# Patient Record
Sex: MALE | Race: BLACK OR AFRICAN AMERICAN | Employment: OTHER | URBAN - NONMETROPOLITAN AREA
[De-identification: names, ages, dates, MRNs, and addresses within clinical notes are randomized per-mention and may not be internally consistent; named-entity substitution may affect disease eponyms.]

---

## 2024-08-11 ENCOUNTER — APPOINTMENT (OUTPATIENT)
Facility: HOSPITAL | Age: 84
End: 2024-08-11
Payer: OTHER GOVERNMENT

## 2024-08-11 ENCOUNTER — HOSPITAL ENCOUNTER (EMERGENCY)
Facility: HOSPITAL | Age: 84
Discharge: ANOTHER HOSPITAL | End: 2024-08-11
Attending: EMERGENCY MEDICINE
Payer: OTHER GOVERNMENT

## 2024-08-11 ENCOUNTER — HOSPITAL ENCOUNTER (INPATIENT)
Facility: HOSPITAL | Age: 84
LOS: 17 days | Discharge: ANOTHER HOSPITAL | DRG: 204 | End: 2024-08-28
Attending: STUDENT IN AN ORGANIZED HEALTH CARE EDUCATION/TRAINING PROGRAM | Admitting: INTERNAL MEDICINE
Payer: OTHER GOVERNMENT

## 2024-08-11 VITALS
BODY MASS INDEX: 19.44 KG/M2 | WEIGHT: 121 LBS | HEART RATE: 84 BPM | SYSTOLIC BLOOD PRESSURE: 162 MMHG | DIASTOLIC BLOOD PRESSURE: 99 MMHG | HEIGHT: 66 IN | OXYGEN SATURATION: 93 % | TEMPERATURE: 97.8 F | RESPIRATION RATE: 18 BRPM

## 2024-08-11 DIAGNOSIS — E87.5 ACUTE HYPERKALEMIA: ICD-10-CM

## 2024-08-11 DIAGNOSIS — I42.9 CARDIOMYOPATHY, UNSPECIFIED TYPE (HCC): ICD-10-CM

## 2024-08-11 DIAGNOSIS — I47.10 SVT (SUPRAVENTRICULAR TACHYCARDIA) (HCC): ICD-10-CM

## 2024-08-11 DIAGNOSIS — E87.5 HYPERKALEMIA: ICD-10-CM

## 2024-08-11 DIAGNOSIS — N17.9 AKI (ACUTE KIDNEY INJURY) (HCC): Primary | ICD-10-CM

## 2024-08-11 DIAGNOSIS — J90 PLEURAL EFFUSION ON RIGHT: ICD-10-CM

## 2024-08-11 DIAGNOSIS — N17.9 ACUTE RENAL FAILURE, UNSPECIFIED ACUTE RENAL FAILURE TYPE (HCC): Primary | ICD-10-CM

## 2024-08-11 LAB
ALBUMIN SERPL-MCNC: 2.8 G/DL (ref 3.5–5)
ALBUMIN/GLOB SERPL: 0.5 (ref 1.1–2.2)
ALP SERPL-CCNC: 109 U/L (ref 45–117)
ALT SERPL-CCNC: 21 U/L (ref 12–78)
ANION GAP SERPL CALC-SCNC: 10 MMOL/L (ref 5–15)
ANION GAP SERPL CALC-SCNC: 11 MMOL/L (ref 5–15)
ANION GAP SERPL CALC-SCNC: 15 MMOL/L (ref 5–15)
AST SERPL W P-5'-P-CCNC: 26 U/L (ref 15–37)
BASOPHILS # BLD: 0.1 K/UL (ref 0–0.1)
BASOPHILS NFR BLD: 1 % (ref 0–1)
BILIRUB SERPL-MCNC: 0.3 MG/DL (ref 0.2–1)
BUN SERPL-MCNC: 81 MG/DL (ref 6–20)
BUN SERPL-MCNC: 82 MG/DL (ref 6–20)
BUN SERPL-MCNC: 90 MG/DL (ref 6–20)
BUN/CREAT SERPL: 22 (ref 12–20)
BUN/CREAT SERPL: 22 (ref 12–20)
BUN/CREAT SERPL: 23 (ref 12–20)
CA-I BLD-MCNC: 10 MG/DL (ref 8.5–10.1)
CA-I BLD-MCNC: 8.8 MG/DL (ref 8.5–10.1)
CA-I BLD-MCNC: 9.1 MG/DL (ref 8.5–10.1)
CHLORIDE SERPL-SCNC: 100 MMOL/L (ref 97–108)
CHLORIDE SERPL-SCNC: 111 MMOL/L (ref 97–108)
CHLORIDE SERPL-SCNC: 111 MMOL/L (ref 97–108)
CO2 SERPL-SCNC: 17 MMOL/L (ref 21–32)
CO2 SERPL-SCNC: 18 MMOL/L (ref 21–32)
CO2 SERPL-SCNC: 18 MMOL/L (ref 21–32)
CREAT SERPL-MCNC: 3.62 MG/DL (ref 0.7–1.3)
CREAT SERPL-MCNC: 3.62 MG/DL (ref 0.7–1.3)
CREAT SERPL-MCNC: 4.16 MG/DL (ref 0.7–1.3)
DIFFERENTIAL METHOD BLD: ABNORMAL
EOSINOPHIL # BLD: 0.2 K/UL (ref 0–0.4)
EOSINOPHIL NFR BLD: 2 % (ref 0–7)
ERYTHROCYTE [DISTWIDTH] IN BLOOD BY AUTOMATED COUNT: 18.6 % (ref 11.5–14.5)
GLOBULIN SER CALC-MCNC: 5.3 G/DL (ref 2–4)
GLUCOSE SERPL-MCNC: 119 MG/DL (ref 65–100)
GLUCOSE SERPL-MCNC: 125 MG/DL (ref 65–100)
GLUCOSE SERPL-MCNC: 152 MG/DL (ref 65–100)
HCT VFR BLD AUTO: 30.8 % (ref 36.6–50.3)
HGB BLD-MCNC: 9.7 G/DL (ref 12.1–17)
IMM GRANULOCYTES # BLD AUTO: 0.1 K/UL (ref 0–0.04)
IMM GRANULOCYTES NFR BLD AUTO: 1 % (ref 0–0.5)
LYMPHOCYTES # BLD: 0.4 K/UL (ref 0.8–3.5)
LYMPHOCYTES NFR BLD: 5 % (ref 12–49)
MAGNESIUM SERPL-MCNC: 2.4 MG/DL (ref 1.6–2.4)
MCH RBC QN AUTO: 31 PG (ref 26–34)
MCHC RBC AUTO-ENTMCNC: 31.5 G/DL (ref 30–36.5)
MCV RBC AUTO: 98.4 FL (ref 80–99)
MONOCYTES # BLD: 1.2 K/UL (ref 0–1)
MONOCYTES NFR BLD: 16 % (ref 5–13)
NEUTS SEG # BLD: 5.6 K/UL (ref 1.8–8)
NEUTS SEG NFR BLD: 75 % (ref 32–75)
NRBC # BLD: 0 K/UL (ref 0–0.01)
NRBC BLD-RTO: 0 PER 100 WBC
PLATELET # BLD AUTO: 351 K/UL (ref 150–400)
PMV BLD AUTO: 9.6 FL (ref 8.9–12.9)
POTASSIUM SERPL-SCNC: 5.1 MMOL/L (ref 3.5–5.1)
POTASSIUM SERPL-SCNC: 5.5 MMOL/L (ref 3.5–5.1)
POTASSIUM SERPL-SCNC: 6.6 MMOL/L (ref 3.5–5.1)
PROT SERPL-MCNC: 8.1 G/DL (ref 6.4–8.2)
RBC # BLD AUTO: 3.13 M/UL (ref 4.1–5.7)
RBC MORPH BLD: ABNORMAL
SODIUM SERPL-SCNC: 133 MMOL/L (ref 136–145)
SODIUM SERPL-SCNC: 138 MMOL/L (ref 136–145)
SODIUM SERPL-SCNC: 140 MMOL/L (ref 136–145)
WBC # BLD AUTO: 7.6 K/UL (ref 4.1–11.1)

## 2024-08-11 PROCEDURE — 80053 COMPREHEN METABOLIC PANEL: CPT

## 2024-08-11 PROCEDURE — 6360000002 HC RX W HCPCS: Performed by: STUDENT IN AN ORGANIZED HEALTH CARE EDUCATION/TRAINING PROGRAM

## 2024-08-11 PROCEDURE — 96365 THER/PROPH/DIAG IV INF INIT: CPT

## 2024-08-11 PROCEDURE — 71045 X-RAY EXAM CHEST 1 VIEW: CPT

## 2024-08-11 PROCEDURE — 83735 ASSAY OF MAGNESIUM: CPT

## 2024-08-11 PROCEDURE — 93005 ELECTROCARDIOGRAM TRACING: CPT | Performed by: STUDENT IN AN ORGANIZED HEALTH CARE EDUCATION/TRAINING PROGRAM

## 2024-08-11 PROCEDURE — 85025 COMPLETE CBC W/AUTO DIFF WBC: CPT

## 2024-08-11 PROCEDURE — 93005 ELECTROCARDIOGRAM TRACING: CPT | Performed by: EMERGENCY MEDICINE

## 2024-08-11 PROCEDURE — 36415 COLL VENOUS BLD VENIPUNCTURE: CPT

## 2024-08-11 PROCEDURE — 2500000003 HC RX 250 WO HCPCS: Performed by: EMERGENCY MEDICINE

## 2024-08-11 PROCEDURE — 99285 EMERGENCY DEPT VISIT HI MDM: CPT

## 2024-08-11 PROCEDURE — 2580000003 HC RX 258: Performed by: EMERGENCY MEDICINE

## 2024-08-11 PROCEDURE — 96375 TX/PRO/DX INJ NEW DRUG ADDON: CPT

## 2024-08-11 PROCEDURE — 80048 BASIC METABOLIC PNL TOTAL CA: CPT

## 2024-08-11 PROCEDURE — 82550 ASSAY OF CK (CPK): CPT

## 2024-08-11 PROCEDURE — 94640 AIRWAY INHALATION TREATMENT: CPT

## 2024-08-11 PROCEDURE — 94761 N-INVAS EAR/PLS OXIMETRY MLT: CPT

## 2024-08-11 PROCEDURE — 96361 HYDRATE IV INFUSION ADD-ON: CPT

## 2024-08-11 PROCEDURE — 6360000002 HC RX W HCPCS: Performed by: EMERGENCY MEDICINE

## 2024-08-11 PROCEDURE — 1100000000 HC RM PRIVATE

## 2024-08-11 PROCEDURE — 6370000000 HC RX 637 (ALT 250 FOR IP): Performed by: EMERGENCY MEDICINE

## 2024-08-11 PROCEDURE — 96374 THER/PROPH/DIAG INJ IV PUSH: CPT

## 2024-08-11 RX ORDER — PRIMIDONE 50 MG/1
100 TABLET ORAL 2 TIMES DAILY
Status: DISCONTINUED | OUTPATIENT
Start: 2024-08-12 | End: 2024-08-28 | Stop reason: HOSPADM

## 2024-08-11 RX ORDER — POLYETHYLENE GLYCOL 3350 17 G/17G
17 POWDER, FOR SOLUTION ORAL DAILY PRN
Status: DISCONTINUED | OUTPATIENT
Start: 2024-08-11 | End: 2024-08-28 | Stop reason: HOSPADM

## 2024-08-11 RX ORDER — ONDANSETRON 2 MG/ML
4 INJECTION INTRAMUSCULAR; INTRAVENOUS EVERY 6 HOURS PRN
Status: DISCONTINUED | OUTPATIENT
Start: 2024-08-11 | End: 2024-08-28 | Stop reason: HOSPADM

## 2024-08-11 RX ORDER — HYDROMORPHONE HYDROCHLORIDE 1 MG/ML
0.5 INJECTION, SOLUTION INTRAMUSCULAR; INTRAVENOUS; SUBCUTANEOUS EVERY 4 HOURS PRN
Status: DISPENSED | OUTPATIENT
Start: 2024-08-11 | End: 2024-08-13

## 2024-08-11 RX ORDER — HYDROMORPHONE HYDROCHLORIDE 1 MG/ML
0.25 INJECTION, SOLUTION INTRAMUSCULAR; INTRAVENOUS; SUBCUTANEOUS EVERY 4 HOURS PRN
Status: DISPENSED | OUTPATIENT
Start: 2024-08-11 | End: 2024-08-13

## 2024-08-11 RX ORDER — PRIMIDONE 50 MG/1
100 TABLET ORAL 2 TIMES DAILY
COMMUNITY
Start: 2022-01-14

## 2024-08-11 RX ORDER — AMLODIPINE BESYLATE 5 MG/1
10 TABLET ORAL DAILY
Status: DISCONTINUED | OUTPATIENT
Start: 2024-08-12 | End: 2024-08-15

## 2024-08-11 RX ORDER — DEXTROSE MONOHYDRATE 25 G/50ML
25 INJECTION, SOLUTION INTRAVENOUS ONCE
Status: COMPLETED | OUTPATIENT
Start: 2024-08-11 | End: 2024-08-11

## 2024-08-11 RX ORDER — ATORVASTATIN CALCIUM 40 MG/1
0.5 TABLET, FILM COATED ORAL DAILY
COMMUNITY
Start: 2024-07-31

## 2024-08-11 RX ORDER — HYDRALAZINE HYDROCHLORIDE 25 MG/1
25 TABLET, FILM COATED ORAL 3 TIMES DAILY
COMMUNITY

## 2024-08-11 RX ORDER — FENTANYL CITRATE 50 UG/ML
25 INJECTION, SOLUTION INTRAMUSCULAR; INTRAVENOUS
Status: COMPLETED | OUTPATIENT
Start: 2024-08-11 | End: 2024-08-11

## 2024-08-11 RX ORDER — SODIUM CHLORIDE 0.9 % (FLUSH) 0.9 %
5-40 SYRINGE (ML) INJECTION EVERY 12 HOURS SCHEDULED
Status: DISCONTINUED | OUTPATIENT
Start: 2024-08-11 | End: 2024-08-28 | Stop reason: HOSPADM

## 2024-08-11 RX ORDER — 0.9 % SODIUM CHLORIDE 0.9 %
1000 INTRAVENOUS SOLUTION INTRAVENOUS ONCE
Status: COMPLETED | OUTPATIENT
Start: 2024-08-11 | End: 2024-08-11

## 2024-08-11 RX ORDER — ACETAMINOPHEN 650 MG/1
650 SUPPOSITORY RECTAL EVERY 6 HOURS PRN
Status: DISCONTINUED | OUTPATIENT
Start: 2024-08-11 | End: 2024-08-28 | Stop reason: HOSPADM

## 2024-08-11 RX ORDER — HYDRALAZINE HYDROCHLORIDE 25 MG/1
25 TABLET, FILM COATED ORAL 3 TIMES DAILY
Status: DISCONTINUED | OUTPATIENT
Start: 2024-08-11 | End: 2024-08-16

## 2024-08-11 RX ORDER — ONDANSETRON 4 MG/1
4 TABLET, ORALLY DISINTEGRATING ORAL EVERY 8 HOURS PRN
Status: DISCONTINUED | OUTPATIENT
Start: 2024-08-11 | End: 2024-08-28 | Stop reason: HOSPADM

## 2024-08-11 RX ORDER — MEGESTROL ACETATE 40 MG/ML
400 SUSPENSION ORAL DAILY
Status: DISCONTINUED | OUTPATIENT
Start: 2024-08-12 | End: 2024-08-28 | Stop reason: HOSPADM

## 2024-08-11 RX ORDER — SODIUM CHLORIDE 0.9 % (FLUSH) 0.9 %
5-40 SYRINGE (ML) INJECTION PRN
Status: DISCONTINUED | OUTPATIENT
Start: 2024-08-11 | End: 2024-08-28 | Stop reason: HOSPADM

## 2024-08-11 RX ORDER — LANOLIN ALCOHOL/MO/W.PET/CERES
1000 CREAM (GRAM) TOPICAL DAILY
Status: DISCONTINUED | OUTPATIENT
Start: 2024-08-12 | End: 2024-08-12

## 2024-08-11 RX ORDER — ATORVASTATIN CALCIUM 20 MG/1
20 TABLET, FILM COATED ORAL DAILY
Status: DISCONTINUED | OUTPATIENT
Start: 2024-08-12 | End: 2024-08-28 | Stop reason: HOSPADM

## 2024-08-11 RX ORDER — MEGESTROL ACETATE 40 MG/ML
400 SUSPENSION ORAL DAILY
COMMUNITY
Start: 2024-07-29

## 2024-08-11 RX ORDER — CALCIUM GLUCONATE 10 MG/ML
1000 INJECTION, SOLUTION INTRAVENOUS
Status: COMPLETED | OUTPATIENT
Start: 2024-08-11 | End: 2024-08-11

## 2024-08-11 RX ORDER — ACETAMINOPHEN 325 MG/1
650 TABLET ORAL EVERY 6 HOURS PRN
Status: DISCONTINUED | OUTPATIENT
Start: 2024-08-11 | End: 2024-08-28 | Stop reason: HOSPADM

## 2024-08-11 RX ORDER — SODIUM CHLORIDE 9 MG/ML
INJECTION, SOLUTION INTRAVENOUS PRN
Status: DISCONTINUED | OUTPATIENT
Start: 2024-08-11 | End: 2024-08-28 | Stop reason: HOSPADM

## 2024-08-11 RX ORDER — AMLODIPINE BESYLATE 10 MG/1
10 TABLET ORAL DAILY
COMMUNITY

## 2024-08-11 RX ADMIN — SODIUM BICARBONATE 50 MEQ: 84 INJECTION, SOLUTION INTRAVENOUS at 16:16

## 2024-08-11 RX ADMIN — ALBUTEROL SULFATE 2.5 MG: 2.5 SOLUTION RESPIRATORY (INHALATION) at 16:24

## 2024-08-11 RX ADMIN — FENTANYL CITRATE 25 MCG: 50 INJECTION INTRAMUSCULAR; INTRAVENOUS at 22:25

## 2024-08-11 RX ADMIN — CALCIUM GLUCONATE 1000 MG: 10 INJECTION, SOLUTION INTRAVENOUS at 15:07

## 2024-08-11 RX ADMIN — DEXTROSE MONOHYDRATE 25 G: 25 INJECTION, SOLUTION INTRAVENOUS at 16:16

## 2024-08-11 RX ADMIN — SODIUM CHLORIDE 1000 ML: 9 INJECTION, SOLUTION INTRAVENOUS at 15:07

## 2024-08-11 RX ADMIN — INSULIN HUMAN 10 UNITS: 100 INJECTION, SOLUTION PARENTERAL at 16:16

## 2024-08-11 RX ADMIN — ALBUTEROL SULFATE 2.5 MG: 2.5 SOLUTION RESPIRATORY (INHALATION) at 16:11

## 2024-08-11 NOTE — ED PROVIDER NOTES
[facility] EMERGENCY DEPT  EMERGENCY DEPARTMENT HISTORY AND PHYSICAL EXAM      Date: 8/11/2024  Patient Name: Abel Cruz  MRN: [id number]  YOB: 1940  Date of evaluation: 8/11/2024  Provider: Chen Talamantes MD   Note Started: 12:31 PM EDT 8/11/24    HISTORY OF PRESENT ILLNESS     Chief Complaint   Patient presents with    Flank Pain    decreased PO intake     Emesis       History Provided By: Patient    HPI: Abel Cruz is a 83 y.o. male     PAST MEDICAL HISTORY   Past Medical History:  No past medical history on file.    Past Surgical History:  No past surgical history on file.    Family History:  No family history on file.    Social History:       Allergies:  Not on File    PCP: Jeremy Grimes MD    Current Meds:   No current facility-administered medications for this encounter.     No current outpatient medications on file.       Social Determinants of Health:   Social Determinants of Health     Tobacco Use: Medium Risk (7/30/2024)    Received from [employer] [facility]    Patient History     Smoking Tobacco Use: Former     Smokeless Tobacco Use: Never     Passive Exposure: Not on file   Alcohol Use: Not on file   Financial Resource Strain: Not on file   Food Insecurity: Not on file   Transportation Needs: Not on file   Physical Activity: Not on file   Stress: Not on file   Social Connections: Not on file   Intimate Partner Violence: Not on file   Depression: Not at risk (7/29/2024)    Received from [employer] [facility] [facility]    PHQ-2     PHQ Total Score: 0   Housing Stability: Not on file   Interpersonal Safety: Not on file   Utilities: Not on file       PHYSICAL EXAM   Physical Exam  Vitals and nursing note reviewed.   Constitutional:       General: He is not in acute distress.     Appearance: He is ill-appearing. He is not toxic-appearing or diaphoretic.   HENT:      Head: Normocephalic and atraumatic.      Nose: Nose normal.      Mouth/Throat:      Mouth: Mucous membranes

## 2024-08-11 NOTE — ED NOTES
Patient ate applesauce with no problem.  But then said he had pain in his upper abdomen.  He also said he forgot to tell anyone that his stool yesterday may have been black he wasn't sure because his sight is not good.  So he wasn't sure if it had blood in it or not.  He's resting now and talking with family.  Nurse notified.

## 2024-08-11 NOTE — ED TRIAGE NOTES
Pt reported rt side flank pain on going over the last month pt also with decreased PO intake, pt unable to keep any solid food down

## 2024-08-12 ENCOUNTER — APPOINTMENT (OUTPATIENT)
Facility: HOSPITAL | Age: 84
DRG: 204 | End: 2024-08-12
Attending: INTERNAL MEDICINE
Payer: OTHER GOVERNMENT

## 2024-08-12 ENCOUNTER — APPOINTMENT (OUTPATIENT)
Facility: HOSPITAL | Age: 84
DRG: 204 | End: 2024-08-12
Payer: OTHER GOVERNMENT

## 2024-08-12 PROBLEM — N13.30 HYDRONEPHROSIS: Status: ACTIVE | Noted: 2024-08-11

## 2024-08-12 LAB
ANION GAP SERPL CALC-SCNC: 11 MMOL/L (ref 5–15)
APPEARANCE FLD: ABNORMAL
BASOPHILS # BLD: 0.1 K/UL (ref 0–0.1)
BASOPHILS NFR BLD: 1 % (ref 0–1)
BUN SERPL-MCNC: 83 MG/DL (ref 6–20)
BUN/CREAT SERPL: 24 (ref 12–20)
CA-I BLD-MCNC: 9.6 MG/DL (ref 8.5–10.1)
CHLORIDE SERPL-SCNC: 112 MMOL/L (ref 97–108)
CK SERPL-CCNC: 49 U/L (ref 39–308)
CO2 SERPL-SCNC: 15 MMOL/L (ref 21–32)
COLOR FLD: ABNORMAL
CREAT SERPL-MCNC: 3.41 MG/DL (ref 0.7–1.3)
CREAT UR-MCNC: 114 MG/DL
DIFFERENTIAL METHOD BLD: ABNORMAL
EKG ATRIAL RATE: 87 BPM
EKG ATRIAL RATE: 96 BPM
EKG DIAGNOSIS: NORMAL
EKG DIAGNOSIS: NORMAL
EKG P AXIS: 203 DEGREES
EKG P AXIS: 55 DEGREES
EKG P-R INTERVAL: 122 MS
EKG P-R INTERVAL: 148 MS
EKG Q-T INTERVAL: 384 MS
EKG Q-T INTERVAL: 397 MS
EKG QRS DURATION: 101 MS
EKG QRS DURATION: 96 MS
EKG QTC CALCULATION (BAZETT): 478 MS
EKG QTC CALCULATION (BAZETT): 485 MS
EKG R AXIS: -44 DEGREES
EKG R AXIS: 219 DEGREES
EKG T AXIS: 147 DEGREES
EKG T AXIS: 62 DEGREES
EKG VENTRICULAR RATE: 87 BPM
EKG VENTRICULAR RATE: 96 BPM
EOSINOPHIL # BLD: 0.1 K/UL (ref 0–0.4)
EOSINOPHIL NFR BLD: 2 % (ref 0–7)
EOSINOPHIL NFR FLD MANUAL: 0 %
EOSINOPHIL NFR FLD MANUAL: ABNORMAL %
ERYTHROCYTE [DISTWIDTH] IN BLOOD BY AUTOMATED COUNT: 17.9 % (ref 11.5–14.5)
FERRITIN SERPL-MCNC: 614 NG/ML (ref 26–388)
FOLATE SERPL-MCNC: 6.2 NG/ML (ref 5–21)
GLUCOSE SERPL-MCNC: 119 MG/DL (ref 65–100)
HCT VFR BLD AUTO: 26.5 % (ref 36.6–50.3)
HGB BLD-MCNC: 8.6 G/DL (ref 12.1–17)
IMM GRANULOCYTES # BLD AUTO: 0 K/UL (ref 0–0.04)
IMM GRANULOCYTES NFR BLD AUTO: 0 % (ref 0–0.5)
IRON SATN MFR SERPL: 26 % (ref 20–50)
IRON SERPL-MCNC: 29 UG/DL (ref 35–150)
LDH FLD L TO P-CCNC: 392 U/L
LDH SERPL L TO P-CCNC: 181 U/L (ref 85–241)
LYMPHOCYTES # BLD: 0.4 K/UL (ref 0.8–3.5)
LYMPHOCYTES NFR BLD: 6 % (ref 12–49)
LYMPHOCYTES NFR FLD: 4 %
LYMPHOCYTES NFR FLD: ABNORMAL %
MCH RBC QN AUTO: 30.9 PG (ref 26–34)
MCHC RBC AUTO-ENTMCNC: 32.5 G/DL (ref 30–36.5)
MCV RBC AUTO: 95.3 FL (ref 80–99)
MONOCYTES # BLD: 1.4 K/UL (ref 0–1)
MONOCYTES NFR BLD: 21 % (ref 5–13)
MONOCYTES NFR FLD: 0 %
MONOCYTES NFR FLD: ABNORMAL %
MONOS+MACROS NFR FLD: 14 %
NEUTROPHILS NFR FLD: 82 %
NEUTROPHILS NFR FLD: ABNORMAL %
NEUTS BAND # FLD: 0 %
NEUTS BAND # FLD: ABNORMAL %
NEUTS SEG # BLD: 4.8 K/UL (ref 1.8–8)
NEUTS SEG NFR BLD: 70 % (ref 32–75)
NRBC # BLD: 0 K/UL (ref 0–0.01)
NRBC BLD-RTO: 0 PER 100 WBC
NUC CELL # FLD: 2066 /CU MM
PLATELET # BLD AUTO: 287 K/UL (ref 150–400)
PMV BLD AUTO: 9.5 FL (ref 8.9–12.9)
POTASSIUM SERPL-SCNC: 4.9 MMOL/L (ref 3.5–5.1)
PROT FLD-MCNC: 5.5 G/DL
RBC # BLD AUTO: 2.78 M/UL (ref 4.1–5.7)
RBC # FLD: >100 /CU MM
RBC MORPH BLD: ABNORMAL
RETICS # AUTO: 0.05 M/UL (ref 0.03–0.1)
RETICS/RBC NFR AUTO: 2 % (ref 0.7–2.1)
SODIUM SERPL-SCNC: 138 MMOL/L (ref 136–145)
SODIUM UR-SCNC: 36 MMOL/L
SPECIMEN SOURCE FLD: ABNORMAL
SPECIMEN SOURCE FLD: NORMAL
SPECIMEN SOURCE FLD: NORMAL
TIBC SERPL-MCNC: 113 UG/DL (ref 250–450)
VIT B12 SERPL-MCNC: >2000 PG/ML (ref 193–986)
WBC # BLD AUTO: 6.8 K/UL (ref 4.1–11.1)

## 2024-08-12 PROCEDURE — C1729 CATH, DRAINAGE: HCPCS

## 2024-08-12 PROCEDURE — 71045 X-RAY EXAM CHEST 1 VIEW: CPT

## 2024-08-12 PROCEDURE — 2500000003 HC RX 250 WO HCPCS: Performed by: INTERNAL MEDICINE

## 2024-08-12 PROCEDURE — 87205 SMEAR GRAM STAIN: CPT

## 2024-08-12 PROCEDURE — 2500000003 HC RX 250 WO HCPCS

## 2024-08-12 PROCEDURE — 36415 COLL VENOUS BLD VENIPUNCTURE: CPT

## 2024-08-12 PROCEDURE — 83540 ASSAY OF IRON: CPT

## 2024-08-12 PROCEDURE — 6360000002 HC RX W HCPCS: Performed by: INTERNAL MEDICINE

## 2024-08-12 PROCEDURE — 6370000000 HC RX 637 (ALT 250 FOR IP): Performed by: INTERNAL MEDICINE

## 2024-08-12 PROCEDURE — 0W993ZZ DRAINAGE OF RIGHT PLEURAL CAVITY, PERCUTANEOUS APPROACH: ICD-10-PCS | Performed by: RADIOLOGY

## 2024-08-12 PROCEDURE — 2580000003 HC RX 258: Performed by: INTERNAL MEDICINE

## 2024-08-12 PROCEDURE — 2580000003 HC RX 258

## 2024-08-12 PROCEDURE — 89051 BODY FLUID CELL COUNT: CPT

## 2024-08-12 PROCEDURE — 6360000002 HC RX W HCPCS

## 2024-08-12 PROCEDURE — 82746 ASSAY OF FOLIC ACID SERUM: CPT

## 2024-08-12 PROCEDURE — 82607 VITAMIN B-12: CPT

## 2024-08-12 PROCEDURE — 82728 ASSAY OF FERRITIN: CPT

## 2024-08-12 PROCEDURE — 80048 BASIC METABOLIC PNL TOTAL CA: CPT

## 2024-08-12 PROCEDURE — 84157 ASSAY OF PROTEIN OTHER: CPT

## 2024-08-12 PROCEDURE — 83615 LACTATE (LD) (LDH) ENZYME: CPT

## 2024-08-12 PROCEDURE — 84300 ASSAY OF URINE SODIUM: CPT

## 2024-08-12 PROCEDURE — 99222 1ST HOSP IP/OBS MODERATE 55: CPT | Performed by: STUDENT IN AN ORGANIZED HEALTH CARE EDUCATION/TRAINING PROGRAM

## 2024-08-12 PROCEDURE — 87070 CULTURE OTHR SPECIMN AEROBIC: CPT

## 2024-08-12 PROCEDURE — 88305 TISSUE EXAM BY PATHOLOGIST: CPT

## 2024-08-12 PROCEDURE — 82570 ASSAY OF URINE CREATININE: CPT

## 2024-08-12 PROCEDURE — 88112 CYTOPATH CELL ENHANCE TECH: CPT

## 2024-08-12 PROCEDURE — 1100000000 HC RM PRIVATE

## 2024-08-12 PROCEDURE — 85025 COMPLETE CBC W/AUTO DIFF WBC: CPT

## 2024-08-12 PROCEDURE — 85045 AUTOMATED RETICULOCYTE COUNT: CPT

## 2024-08-12 RX ORDER — LIDOCAINE 4 G/G
1 PATCH TOPICAL DAILY
COMMUNITY

## 2024-08-12 RX ORDER — PROPRANOLOL HYDROCHLORIDE 80 MG/1
80 CAPSULE, EXTENDED RELEASE ORAL DAILY
Status: DISCONTINUED | OUTPATIENT
Start: 2024-08-12 | End: 2024-08-17

## 2024-08-12 RX ORDER — FERROUS SULFATE 325(65) MG
325 TABLET ORAL
COMMUNITY

## 2024-08-12 RX ORDER — LATANOPROST 50 UG/ML
1 SOLUTION/ DROPS OPHTHALMIC NIGHTLY
COMMUNITY

## 2024-08-12 RX ORDER — SODIUM BICARBONATE 650 MG/1
1300 TABLET ORAL 2 TIMES DAILY
Status: DISCONTINUED | OUTPATIENT
Start: 2024-08-12 | End: 2024-08-14

## 2024-08-12 RX ORDER — FOLIC ACID 1 MG/1
1 TABLET ORAL DAILY
Status: DISCONTINUED | OUTPATIENT
Start: 2024-08-12 | End: 2024-08-28 | Stop reason: HOSPADM

## 2024-08-12 RX ADMIN — SODIUM ZIRCONIUM CYCLOSILICATE 10 G: 10 POWDER, FOR SUSPENSION ORAL at 02:22

## 2024-08-12 RX ADMIN — MEGESTROL ACETATE 400 MG: 40 SUSPENSION ORAL at 13:00

## 2024-08-12 RX ADMIN — AMLODIPINE BESYLATE 10 MG: 5 TABLET ORAL at 10:38

## 2024-08-12 RX ADMIN — HYDRALAZINE HYDROCHLORIDE 25 MG: 25 TABLET ORAL at 10:42

## 2024-08-12 RX ADMIN — SODIUM BICARBONATE: 84 INJECTION, SOLUTION INTRAVENOUS at 13:02

## 2024-08-12 RX ADMIN — PRIMIDONE 100 MG: 50 TABLET ORAL at 21:30

## 2024-08-12 RX ADMIN — SODIUM CHLORIDE, PRESERVATIVE FREE 10 ML: 5 INJECTION INTRAVENOUS at 13:02

## 2024-08-12 RX ADMIN — HYDROMORPHONE HYDROCHLORIDE 0.25 MG: 1 INJECTION, SOLUTION INTRAMUSCULAR; INTRAVENOUS; SUBCUTANEOUS at 10:41

## 2024-08-12 RX ADMIN — HYDRALAZINE HYDROCHLORIDE 25 MG: 25 TABLET ORAL at 02:19

## 2024-08-12 RX ADMIN — PRIMIDONE 100 MG: 50 TABLET ORAL at 13:00

## 2024-08-12 RX ADMIN — SODIUM BICARBONATE 1300 MG: 650 TABLET ORAL at 10:53

## 2024-08-12 RX ADMIN — HYDRALAZINE HYDROCHLORIDE 25 MG: 25 TABLET ORAL at 21:27

## 2024-08-12 RX ADMIN — SODIUM BICARBONATE 50 MEQ: 84 INJECTION, SOLUTION INTRAVENOUS at 10:41

## 2024-08-12 RX ADMIN — SODIUM BICARBONATE 1300 MG: 650 TABLET ORAL at 21:27

## 2024-08-12 RX ADMIN — HYDROMORPHONE HYDROCHLORIDE 0.5 MG: 1 INJECTION, SOLUTION INTRAMUSCULAR; INTRAVENOUS; SUBCUTANEOUS at 02:25

## 2024-08-12 RX ADMIN — PROPRANOLOL HYDROCHLORIDE 80 MG: 80 CAPSULE, EXTENDED RELEASE ORAL at 13:00

## 2024-08-12 RX ADMIN — CYANOCOBALAMIN TAB 1000 MCG 1000 MCG: 1000 TAB at 13:00

## 2024-08-12 RX ADMIN — SODIUM CHLORIDE 125 MG: 9 INJECTION, SOLUTION INTRAVENOUS at 17:12

## 2024-08-12 RX ADMIN — ATORVASTATIN CALCIUM 20 MG: 20 TABLET, FILM COATED ORAL at 10:38

## 2024-08-12 RX ADMIN — SODIUM CHLORIDE, PRESERVATIVE FREE 10 ML: 5 INJECTION INTRAVENOUS at 21:30

## 2024-08-12 ASSESSMENT — PAIN SCALES - GENERAL
PAINLEVEL_OUTOF10: 0
PAINLEVEL_OUTOF10: 8
PAINLEVEL_OUTOF10: 6
PAINLEVEL_OUTOF10: 7

## 2024-08-12 ASSESSMENT — PAIN DESCRIPTION - LOCATION
LOCATION: CHEST
LOCATION: BACK

## 2024-08-12 ASSESSMENT — PAIN DESCRIPTION - DESCRIPTORS: DESCRIPTORS: ACHING;SHARP

## 2024-08-12 ASSESSMENT — PAIN DESCRIPTION - ORIENTATION: ORIENTATION: MID

## 2024-08-12 NOTE — OR NURSING
1500 cc thin red drained from right posterior chest. Stat cxr ordered post thoracentesis. Fluids to the lab for analysis as ordered.

## 2024-08-12 NOTE — H&P
History & Physical    Primary Care Provider: Jeremy Grimes MD    Chief complaint:   Chief Complaint   Patient presents with    transfer of care        History of Presenting Illness:   Abel Cruz is a 83 y.o. male with PMH of hypertension, hypertension, tremors, Presented to the ED with chief complaint of right flank pain for the past month. Progressively worsening, rated 7/10.  Pain exacerbated by cough. He has been having nonproductive cough.  No shortness of breath, wheezing, fever or chills. He also reports unintentional weight loss (10-15 Ibs in past month) and loss of appetite in the past month. Lastly, he reports noticing ome dark stool in the past week, although uncertain due to to poor eyesight. Reports no abdominal pain. 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 Drinks alcohol daily up until a month ago, stopped drinking due to loss of appetite. Non-smoker. Has history of prostate cancer s/p therapy.      In the ED, noted hypertensive. Hemoglobin 9.7, creatinine 3.6, uncertain baseline. Potassium initially 6.6, improved to 5.1 with IV insulin. CT showed new multiple variable sized pulmonary nodules throughout each segment of both lungs suspicious for metastatic disease. New moderate sized right pleural effusion. New mild hydroureteronephrosis on the right kidney. The right adrenal gland has an indeterminate nodule measuring up to 8 mm in diameter. Right kidney demonstrates a 2.5 cm cyst in the upper pole.       Chart review: none          Past Medical History:   Diagnosis Date    Hypertension         Past Surgical History:   Procedure Laterality Date    KIDNEY REMOVAL      over 20 years ago       History reviewed. No pertinent family history.     Social History     Socioeconomic History    Marital status: Single     Spouse name: None    Number of children: None    Years of education: None    Highest education level: None        PTA medications and allergies per EMR.

## 2024-08-12 NOTE — CONSULTS
Urology Consult    Patient: Abel Cruz MRN: [id number]  SSN: [id number]    YOB: 1940  Age: 83 y.o.  Sex: male          Date of Consultation:  August 12, 2024  Requesting Physician: Nikita Shankar MD  Reason for Consultation: Mild Right Hydronephrosis           Assessment/Plan:  NETO on CKD: baseline kidney function is uncertain. Nephrology following.   History of Left Nephrectomy: patient reports kidney was removed many years ago secondary to recurrent kidney stones  Right hydronephrosis - mild hydronephrosis noted on CT report from outside facility. Unable to review images personally. Given NETO and mild hydronephrosis, we will plan for cystoscopy with retrograde pyelogram of the right ureter and kidney on 8/14/24 while patient is admitted.   Prostate Cancer - [facility] brachythe[facility] and [facility] Records review shows the PSA was undetectable in 2022. PSA level pending to monitor for progression.     - Plan for cystoscopy with retrograde pyelogram and possible stent placement of the right ureter and kidney on 8/14/24     History of Present Illness:  Patient is a 83 y.o. male admitted 8/11/2024 to the hospital for Hyperkalemia [E87.5]  Pleural effusion [J90]  NETO (acute kidney injury) (HCC) [N17.9].  He is an 83 year old male admitted to the hospital for decreased appetite with unintentional weight loss, right flank pain. CT scan showed mild hydronephrosis of the right ureter and absent left kidney secondary to previous nephrectomy. Urology consulted for assistance with the hydronephrosis.   Past Medical History:  Allergies   Allergen Reactions    Naproxen Other (See Comments)     Gastric Ulcer With Hemorrhage.    Lisinopril Nausea And Vomiting    Morphine Rash      Prior to Admission medications    Medication Sig Start Date End Date Taking? Authorizing Provider   latanoprost (XALATAN) 0.005 % ophthalmic solution Place 1 drop into both eyes  palpable  LYMPH: No cervical,k supraclavicular or axillary ALMA      Lab Results   Component Value Date/Time    WBC 6.8 08/12/2024 06:24 AM    HCT 26.5 08/12/2024 06:24 AM     08/12/2024 06:24 AM     08/12/2024 06:24 AM    K 4.9 08/12/2024 06:24 AM     08/12/2024 06:24 AM    CO2 15 08/12/2024 06:24 AM    BUN 83 08/12/2024 06:24 AM    MG 2.4 08/11/2024 02:20 PM     I reviewed the outside medical records from [facility]. I also reviewed the labs including a CBC, BMP and the urine creatinine and sodium.     Signed By: Marv Malhotra MD  - August 12, 2024

## 2024-08-12 NOTE — CONSULTS
NAME:  Abel Cruz   :   1940   MRN:   [id number]     ATTENDING: Nils Malik Cha, MD  PCP:  Jeremy Grimes MD    Date/Time:  2024       Subjective:   REQUESTING PHYSICIAN:  Dr. Che  REASON FOR CONSULT:   NETO    History of presenting illness:    Abel Cruz is an 83-year-old male with past medical history including hypertension,  hyperlipidemia, left nephrectomy, and tremors who presented to the emergency room with complaint of right flank pain made worse by coughing, decreased appetite with unintentional weight loss, and possibly dark stools, although he is unsure due to his poor insight.  Creatinine was 4.16 on admission, nephrology consulted for management of NETO.    Patient seen at bedside in the emergency department, he is awake, alert and oriented, and reports continued right flank pain that is worse with coughing.  Denies shortness of breath, chest pain, N/V, fever or chills.  Reports 3-4 episodes of diarrhea a few days ago that could have been dark in color; however, patient is uncertain if stools contained blood.  Patient appears clinically dry.  CT chest without contrast showed innumerable tiny nodules throughout both lungs, a moderate large right pleural effusion, and left nephrectomy.  Initial labs showed creatinine of 4.16 improving to 3.4 today.  Most recent BUN 83, CO2 15.  No nephrotoxic medications noted on home med list.  No hypotensive episodes noticed.    Past Medical History:   Diagnosis Date    Hypertension       Past Surgical History:   Procedure Laterality Date    KIDNEY REMOVAL      over 20 years ago     Social History     Tobacco Use    Smoking status: Not on file    Smokeless tobacco: Not on file   Substance Use Topics    Alcohol use: Not on file      History reviewed. No pertinent family history.    Allergies   Allergen Reactions    Naproxen Other (See Comments)     Gastric Ulcer With Hemorrhage.    Lisinopril Nausea And Vomiting    Morphine Rash      Prior to Admission  mg  4 mg IntraVENous Q6H PRN    polyethylene glycol (GLYCOLAX) packet 17 g  17 g Oral Daily PRN    acetaminophen (TYLENOL) tablet 650 mg  650 mg Oral Q6H PRN    Or    acetaminophen (TYLENOL) suppository 650 mg  650 mg Rectal Q6H PRN    HYDROmorphone HCl PF (DILAUDID) injection 0.5 mg  0.5 mg IntraVENous Q4H PRN    HYDROmorphone HCl PF (DILAUDID) injection 0.25 mg  0.25 mg IntraVENous Q4H PRN    amLODIPine (NORVASC) tablet 10 mg  10 mg Oral Daily    atorvastatin (LIPITOR) tablet 20 mg  20 mg Oral Daily    vitamin B-12 (CYANOCOBALAMIN) tablet 1,000 mcg  1,000 mcg Oral Daily    hydrALAZINE (APRESOLINE) tablet 25 mg  25 mg Oral TID    megestrol (MEGACE) 40 MG/ML suspension 400 mg  400 mg Oral Daily    primidone (MYSOLINE) tablet 100 mg  100 mg Oral BID     Current Outpatient Medications   Medication Sig    latanoprost (XALATAN) 0.005 % ophthalmic solution Place 1 drop into both eyes nightly    Calcium Citrate-Vitamin D (SM CALCIUM CITRATE W/VIT D3 PO) Take 1,000 mg by mouth Daily    lidocaine (BLUE-EMU PAIN RELIEF DRY) 4 % external patch Place 1 patch onto the skin daily    ferrous sulfate (IRON 325) 325 (65 Fe) MG tablet Take 1 tablet by mouth daily (with breakfast)    atorvastatin (LIPITOR) 40 MG tablet Take 0.5 tablets by mouth daily    cyanocobalamin 500 MCG tablet Take 2 tablets by mouth daily    hydrALAZINE (APRESOLINE) 25 MG tablet Take 1 tablet by mouth 3 times daily    megestrol (MEGACE) 40 MG/ML suspension Take 10 mLs by mouth daily    primidone (MYSOLINE) 50 MG tablet Take 150 tablets by mouth 3 times daily    propranolol (INNOPRAN XL) 80 MG extended release capsule Take 1 capsule by mouth daily    amLODIPine (NORVASC) 10 MG tablet Take 1 tablet by mouth daily       REVIEW OF SYSTEMS:     Complaints as mentioned in the history of presenting illness.   No other significant complaints on complete review of systems.    Objective:   VITALS:    BP (!) 134/104   Pulse (!) 103   Temp 98 °F (36.7 °C) (Oral)   Resp

## 2024-08-12 NOTE — ED TRIAGE NOTES
Transfer from [place], sent here for pleural effusion, renal failure. Currently alert and oriented on room air.

## 2024-08-12 NOTE — ED PROVIDER NOTES
EMERGENCY DEPARTMENT HISTORY AND PHYSICAL EXAM      Date: 8/11/2024  Patient Name: Abel Cruz  MRN: [id number]  YOB: 1940  Date of evaluation: 8/11/2024  Provider: Manjinder Holland MD     History of Present Illness     Chief Complaint   Patient presents with    transfer of care       History Provided By: Patient    HPI: Abel Cruz, 83 y.o. male with past medical history as listed and reviewed below presenting to the ED as a transfer of care for pleural effusion, acute kidney injury and hyperkalemia.  Patient has right-sided flank pain for the last month, recently had a CT showing possible metastases and pleural effusion.  Was provided medications for hyperkalemia prior to arrival, nephrology made aware at [facility], no recommendations for dialysis were communicated.    Medical History     Past Medical History:  Past Medical History:   Diagnosis Date    Hypertension        Past Surgical History:  Past Surgical History:   Procedure Laterality Date    KIDNEY REMOVAL      over 20 years ago       Family History:  History reviewed. No pertinent family history.    Social History:       Allergies:  Allergies   Allergen Reactions    Naproxen Other (See Comments)     Gastric Ulcer With Hemorrhage.    Lisinopril Nausea And Vomiting    Morphine Rash       PCP: Jeremy Grimes MD    Current Medications:   Current Facility-Administered Medications   Medication Dose Route Frequency Provider Last Rate Last Admin    sodium zirconium cyclosilicate (LOKELMA) oral suspension 10 g  10 g Oral Once Nils Che MD        sodium chloride flush 0.9 % injection 5-40 mL  5-40 mL IntraVENous 2 times per day Nils Che MD        sodium chloride flush 0.9 % injection 5-40 mL  5-40 mL IntraVENous PRN Nils Che MD        0.9 % sodium chloride infusion   IntraVENous PRN Nils Che MD        ondansetron (ZOFRAN-ODT) disintegrating tablet 4 mg  4 mg Oral Q8H PRN Nils Che MD        Or    ondansetron  CO2 18 (L) 21 - 32 mmol/L    Anion Gap 15 5 - 15 mmol/L    Glucose 152 (H) 65 - 100 mg/dL    BUN 90 (H) 6 - 20 mg/dL    Creatinine 4.16 (H) 0.70 - 1.30 mg/dL    BUN/Creatinine Ratio 22 (H) 12 - 20      Est, Glom Filt Rate 14 (L) >60 ml/min/1.73m2    Calcium 10.0 8.5 - 10.1 mg/dL    Total Bilirubin 0.3 0.2 - 1.0 mg/dL    AST 26 15 - 37 U/L    ALT 21 12 - 78 U/L    Alk Phosphatase 109 45 - 117 U/L    Total Protein 8.1 6.4 - 8.2 g/dL    Albumin 2.8 (L) 3.5 - 5.0 g/dL    Globulin 5.3 (H) 2.0 - 4.0 g/dL    Albumin/Globulin Ratio 0.5 (L) 1.1 - 2.2     Magnesium    Collection Time: 08/11/24  2:20 PM   Result Value Ref Range    Magnesium 2.4 1.6 - 2.4 mg/dL   EKG 12 Lead    Collection Time: 08/11/24  3:04 PM   Result Value Ref Range    Ventricular Rate 87 BPM    Atrial Rate 87 BPM    P-R Interval 148 ms    QRS Duration 101 ms    Q-T Interval 397 ms    QTc Calculation (Bazett) 478 ms    P Axis 203 degrees    R Axis 219 degrees    T Axis 147 degrees    Diagnosis       Ectopic atrial rhythm  Right axis deviation  Abnormal R-wave progression, early transition  Nonspecific T abnormalities, lateral leads  Borderline prolonged QT interval     Basic Metabolic Panel    Collection Time: 08/11/24  8:57 PM   Result Value Ref Range    Sodium 138 136 - 145 mmol/L    Potassium 5.5 (H) 3.5 - 5.1 mmol/L    Chloride 111 (H) 97 - 108 mmol/L    CO2 17 (L) 21 - 32 mmol/L    Anion Gap 10 5 - 15 mmol/L    Glucose 119 (H) 65 - 100 mg/dL    BUN 81 (H) 6 - 20 mg/dL    Creatinine 3.62 (H) 0.70 - 1.30 mg/dL    BUN/Creatinine Ratio 22 (H) 12 - 20      Est, Glom Filt Rate 16 (L) >60 ml/min/1.73m2    Calcium 8.8 8.5 - 10.1 mg/dL   Basic Metabolic Panel    Collection Time: 08/11/24 10:23 PM   Result Value Ref Range    Sodium 140 136 - 145 mmol/L    Potassium 5.1 3.5 - 5.1 mmol/L    Chloride 111 (H) 97 - 108 mmol/L    CO2 18 (L) 21 - 32 mmol/L    Anion Gap 11 5 - 15 mmol/L    Glucose 125 (H) 65 - 100 mg/dL    BUN 82 (H) 6 - 20 mg/dL    Creatinine 3.62 (H)

## 2024-08-12 NOTE — CONSULTS
Pulmonary/ CC Consult    Subjective:   Date of Consultation:  August 12, 2024  Referring Physician: Nils Che MD     Thank you for this consultation  Mr. Abel Cruz is an 83 years old -American male who is known to have history of hypertension, he also has history of left nephrectomy in the past for recurrent kidney stones..  Patient is referred by her family physician because of increasing right lower chest discomfort on coughing and taking deep breath.  This has been going on for last week to 10 days.  His pain is quite intense 7 out of 10 intensity.  Patient has lost his appetite and has lost about 10 to 15 pounds over last couple of months.  He lives by himself.  He noted that he lives in [facility].  About a month ago he had CT scan of his spine done for his back pain and right lower chest pains.  He was informed that he has spots on his lung and fluid around his lung and he was getting set for evaluation by specialist for these issues.    He has now ended up in the hospital.  He has poor vision.  Had complaints of some vague abdominal discomfort.  He is ex-smoker who quit smoking in 1980.  He smoked for about 25 years 1 pack/day before he quit smoking.  He also drinks on regular basis but last drink was about a month ago.    Initial workup showed tiny bilateral multiple pulmonary nodules along with moderate-sized right pleural effusion.  He is also noted to have elevated urine creatinine of 90 and 4.16 on admission.  Noted to have hemoglobin of 9.6 g.  He is not sure about altered bowel movements.    Patient Active Problem List   Diagnosis    Pleural effusion     Past Medical History:   Diagnosis Date    Hypertension       History reviewed. No pertinent family history.   Social History     Tobacco Use    Smoking status: Not on file    Smokeless tobacco: Not on file   Substance Use Topics    Alcohol use: Not on file     Past Surgical History:   Procedure Laterality Date    KIDNEY REMOVAL

## 2024-08-12 NOTE — ED NOTES
ED TO INPATIENT SBAR HANDOFF    Patient Name: Abel Cruz   Preferred Name: Abel  : 1940  83 y.o.   Family/Caregiver Present: no   Code Status Order: DNR  PO Status: NPO:Yes  Telemetry Order:   C-SSRS: Risk of Suicide: No Risk  Sitter no     Restraints:     Sepsis Risk Score      Situation    Brief Description of Patient's Condition: transfer from [facility] for leural effusion   Mental Status: oriented, alert, and logical  Arrived from:Home  Imaging:   XR CHEST PORTABLE   Final Result   1. No pneumothorax      Electronically signed by SANTIAGO GARCIA      IR GUIDED THORACENTESIS PLEURAL    (Results Pending)     Abnormal labs:   Abnormal Labs Reviewed   BASIC METABOLIC PANEL - Abnormal; Notable for the following components:       Result Value    Potassium 5.5 (*)     Chloride 111 (*)     CO2 17 (*)     Glucose 119 (*)     BUN 81 (*)     Creatinine 3.62 (*)     BUN/Creatinine Ratio 22 (*)     Est, Glom Filt Rate 16 (*)     All other components within normal limits   BASIC METABOLIC PANEL - Abnormal; Notable for the following components:    Chloride 111 (*)     CO2 18 (*)     Glucose 125 (*)     BUN 82 (*)     Creatinine 3.62 (*)     BUN/Creatinine Ratio 23 (*)     Est, Glom Filt Rate 16 (*)     All other components within normal limits   BASIC METABOLIC PANEL W/ REFLEX TO MG FOR LOW K - Abnormal; Notable for the following components:    Chloride 112 (*)     CO2 15 (*)     Glucose 119 (*)     BUN 83 (*)     Creatinine 3.41 (*)     BUN/Creatinine Ratio 24 (*)     Est, Glom Filt Rate 17 (*)     All other components within normal limits   CBC WITH AUTO DIFFERENTIAL - Abnormal; Notable for the following components:    RBC 2.78 (*)     Hemoglobin 8.6 (*)     Hematocrit 26.5 (*)     RDW 17.9 (*)     Lymphocytes % 6 (*)     Monocytes % 21 (*)     Lymphocytes Absolute 0.4 (*)     Monocytes Absolute 1.4 (*)     All other components within normal limits   IRON AND TIBC - Abnormal; Notable for the following components:

## 2024-08-12 NOTE — CARE COORDINATION
08/12/24 1144   Service Assessment   Patient Orientation Alert and Oriented   Cognition Alert   History Provided By Patient   Primary Caregiver Self   Accompanied By/Relationship Pt alone during visit.   Support Systems Children;Family Members   Patient's Healthcare Decision Maker is: Legal Next of Kin   PCP Verified by CM Yes  ([facility] mos [facility]   Last Visit to PCP Within last year   Prior Functional Level Independent in ADLs/IADLs   Current Functional Level Independent in ADLs/IADLs   Can patient return to prior living arrangement Yes   Ability to make needs known: Fair   Family able to assist with home care needs: Yes   Would you like for me to discuss the discharge plan with any other family members/significant others, and if so, who? Yes  (Daughter Roxana Hayes)   Financial Resources Other (Comment)  ([facility])   Community Resources None   CM/SW Referral Other (see comment)  (None)   Social/Functional History   Lives With Alone   Type of Home Apartment   Home Layout Multi-level   Home Access Stairs to enter without rails;Elevator   Entrance Stairs - Number of Steps 4 the Floor   Bathroom Shower/Tub Tub/Shower unit   Bathroom Toilet Handicap height   Bathroom Equipment Grab bars in shower   Bathroom Accessibility Accessible   Home Equipment None   Receives Help From Family   ADL Assistance Independent   Homemaking Assistance Independent   Homemaking Responsibilities Yes   Ambulation Assistance Independent   Transfer Assistance Independent   Active  No   Occupation Retired   Discharge Planning   Type of Residence Apartment   Living Arrangements Alone   Current Services Prior To Admission None   Potential Assistance Needed N/A   DME Ordered? No   Potential Assistance Purchasing Medications No   Type of Home Care Services None   Patient expects to be discharged to: Apartment   One/Two Story Residence Other (comment)  (4th Floor - elevator.)   History of falls? 0   Services At/After Discharge    Transition of Care Consult (CM Consult) Discharge Planning   Services At/After Discharge None   [place] Resource Information Provided? No   Mode of Transport at Discharge Other (see comment)  (Family)   Confirm Follow Up Transport Family     CM met with pt & D/C Plan is home alone & family to transport. Send Rxs to [facility] in [place], [place] upon discharge. Uses no DME/self care.    Advance Care Planning     General Advance Care Planning (ACP) Conversation    Date of Conversation: 8/12/2024  Conducted with:   Other persons present:     Healthcare Decision Maker:   Primary Decision Maker: Roxana Hayes - Child       Content/Action Overview:    Reviewed DNR/DNI and patient         Length of Voluntary ACP Conversation in minutes:      Tasneem Diallo RN

## 2024-08-12 NOTE — ED NOTES
Pt gave consent for his daughter, Ruth Hayes, to have access to his medical info.  She is able to receive updates on patient's condition.  Witnessed by BENNY Brooks RN and Gabby Thomson RN.

## 2024-08-12 NOTE — CONSULTS
Hemorrhage.    Lisinopril Nausea And Vomiting    Morphine Rash     Review of Systems: + Weight loss, decreased appetite  Constitutional No fevers, chills, night sweats   Eyes + visual difficulties   ENMT No problems with hearing    Hematologic/Lymphatic No easy bruising or bleeding.     Respiratory No dyspnea on exertion, cough or hemoptysis.   Cardiovascular No anginal chest pain, tachycardia, palpitations.   Gastrointestinal No nausea, vomiting, diarrhea, constipation   Genitourinary (M) No hematuria, dysuria   Musculoskeletal No joint pain, swelling or redness.   Integumentary No skin changes.   Neurologic No headache, blurred vision     Objective:     Vitals:    08/12/24 0815 08/12/24 0845 08/12/24 0930 08/12/24 1038   BP: (!) 128/94 (!) 125/92 (!) 139/102 (!) 135/102   Pulse: 98 98 (!) 105    Resp: 17 29     Temp:       SpO2:          Physical Exam:  Constitutional Alert, oriented. Mood and affect appropriate.    Eyes Conjunctivae and sclerae are clear and without icterus.   ENMT No oral ulcers, thrush or mucositis.    Neck Supple without masses or thyromegaly. No jugular venous distension.   Hematologic/Lymphatic No tender or palpable lymph nodes in the cervical, supraclavicular, axillary area.   Respiratory Lungs are clear to auscultation    Cardiovascular Regular rate and rhythm of heart    Abdomen Non-tender, non-distended. Good bowel sounds. No guarding or rebound tenderness.    Musculoskeletal No tenderness or swelling   Extremities No visible deformities or edema.    Skin No rashes or lesions suggestive of malignancy. No petechiae, purpura.   Neurologic Alert and oriented. Coherent speech. Verbalizes understanding of our discussions today.     Lab/Data Review:  Recent Labs     08/11/24  1420 08/12/24  0624   WBC 7.6 6.8   HGB 9.7* 8.6*   HCT 30.8* 26.5*    287     Recent Labs     08/11/24  1420 08/11/24  2057 08/11/24  2223 08/12/24  0624   * 138 140 138   K 6.6* 5.5* 5.1 4.9    111*  measuring approximately 6 mm in diameter. Differential diagnosis includes miliary infection such as tuberculosis and histoplasmosis and hematogenous metastases from tumor such as thyroid carcinoma and renal cell carcinoma.  None of the pulmonary nodules are amenable to image guided percutaneous biopsy.  2. Moderately large right pleural effusion with associated right lower lobe atelectasis  3. Postoperative changes from remote left nephrectomy     CT ABDOMEN WO CONTRAST 8/1/24 @[facility]  IMPRESSION:   New Multiple variable sized pulmonary nodules throughout each segment of both lungs suspicious for metastatic disease. The primary is undetermined.   Retrocrural adenopathy.   New mild hydroureteronephrosis on the right kidney. Status post left nephrectomy.   Stable 5.2 cm infrarenal abdominal aortic aneurysm.   Sigmoid diverticulosis.   Pelvic detail obscured due to artifact from bilateral hip arthroplasties.     Assessment and Plan:     Lung nodules  -Patient follows with HemOnc in [place]  -Found at the beginning of this month at [facility]  -CT chest impression stated none of the pulmonary nodules are amendable to image guided percutaneous biopsy.  -CT chest and CT abdomen both completed at [facility]  -Will need outpatient PET scan    Right pleural effusion  -IR consulted for thoracentesis  -Pulmonology consulted    Anemia  -On chart review patient's hematologist/oncologist note states patient has a history of iron deficiency anemia as well as stage IV CKD  -Hemoglobin on admission 9.7; currently 8.6  -Occult stool ordered  -Iron studies show deficiency will supplement, B12/Folate replete  -Continue to monitor for any bleeding, monitor CBC  -Transfuse for hemoglobin less than 7    Right hydronephrosis  -Uncertain cause, urology consulted    Elevated creatinine  -History of left nephrectomy; nephrology consulted    Thank you for the consult.  Signed By: ESTEBAN Galdamez - CNP     August 12, 2024

## 2024-08-12 NOTE — ED NOTES
Pt reports that he does not want to be resuscitated, pt is DNR.  Witnessed by BENNY Brooks RN and SIM Thomson RN.

## 2024-08-12 NOTE — PROGRESS NOTES
Hospitalist Progress Note    NAME:   Abel Cruz   : 1940   MRN: [id number]     Date/Time: 2024 4:25 PM  Patient PCP: Jeremy Grimes MD    Estimated discharge date: 48-hour  Barriers: Pulmonology, oncology, urology consult    Assessment / Plan:  Abnormal CT of the chest concerning for malignancy  -Pulmonology eval to patient and fill this could be metastatic disease from colon/kidney.  However, pulmonary arteries are tiny and not amenable for bronchoscopy, biopsy or CT-guided biopsy.  Will follow pleural fluid studies from right-sided thoracentesis to determine nature of pleural fluid.  -Oncology evaluated patient and patient would need outpatient PET scan    Right-sided pleural effusion  -Pulmonology recommended right-sided thoracentesis, IR consult  -IV Dilaudid as needed for pain control    Right-sided hydronephrosis  S/p left nephrectomy many years ago  History of prostate cancer   -Urology evaluated patient and planning for cystoscopy with retrograde pyelogram and possible stent placement of the right ureter and kidney on 2024  -Also recommended PSA level to monitor progression of history of prostate    NETO with history of CKD?  Metabolic acidosis likely secondary to above  -Creatinine 3.41  -Bicarb 15  -Nephrology evaluated patient recommend checking urine electrolytes, creatinine and protein as well as UA.  Initially patient on 1 amp of bicarb now with sodium bicarb tablets 1300 mg twice daily and sodium bicarb drip.  -Avoid nephrotoxic agents  -Continue gentle IV fluid    Anemia of chronic disease  -Hemoglobin 8.6  -B12 elevated so will discontinue supplementation, iron panel within normal range, folate low to normal so initiate supplementation  -Check FOBT  -Continue monitor and transfuse hemoglobin to 7    Essential hypertension  -Continue amlodipine and hydralazine    Hyperlipidemia  -Continue Lipitor    Tremors  -Continue primidone and propranolol    Medical Decision Making:    [x] High (any 2)    A. Problems (any 1)  [x] Acute/Chronic Illness/injury posing threat to life or bodily function: Abnormal CT concerning for malignancy  [] Severe exacerbation of chronic illness:    ---------------------------------------------------------------------  B. Risk of Treatment (any 1)   [] Drugs/treatments that require intensive monitoring for toxicity include:    [] IV ABX requiring serial renal monitoring for nephrotoxicity:     [] IV Narcotic analgesia for adverse drug reaction  [] Aggressive IV diuresis requiring serial monitoring for renal impairment and electrolyte derangements  [x] Critical electrolyte abnormalities requiring IV replacement and close serial monitoring  [] SQ Insulin SS- monitoring serial FSBS for Hypoglycemic adverse drug reaction  [] Other -   [x] Change in code status: DNR  [] Decision to escalate care:    [] Major surgery/procedure with associated risk factors:    ----------------------------------------------------------------------  C. Data (any 2)  [x] Discussed current management and discharge planning options with Case Management.  [x] Discussed management of the case with: Patient and RN  [] Telemetry personally reviewed and interpreted as documented above    [] Imaging personally reviewed and interpreted, includes:    [x] Data Review (any 3)  [x] All available Consultant notes from yesterday/today were reviewed  [x] All current labs were reviewed and interpreted for clinical significance   [x] Appropriate follow-up labs were ordered  [] Collateral history obtained from:       Code Status: DNR  DVT Prophylaxis: Hold due to anemia  GI Prophylaxis: Not indicated    Subjective:     Chief Complaint / Reason for Physician Visit  Patient evaluated bedside this morning resting comfortably.  He denies any new complaints at this time.  However, further discussion with patient about CODE STATUS at bedside and he reports he would like to be DNR.  Discussed with RN events

## 2024-08-13 ENCOUNTER — APPOINTMENT (OUTPATIENT)
Facility: HOSPITAL | Age: 84
DRG: 204 | End: 2024-08-13
Payer: OTHER GOVERNMENT

## 2024-08-13 LAB
25(OH)D3 SERPL-MCNC: 24.1 NG/ML (ref 30–100)
ALBUMIN SERPL-MCNC: 2.4 G/DL (ref 3.5–5)
ANION GAP SERPL CALC-SCNC: 8 MMOL/L (ref 5–15)
ANION GAP SERPL CALC-SCNC: 8 MMOL/L (ref 5–15)
BACTERIA SPEC CULT: NORMAL
BACTERIA SPEC CULT: NORMAL
BASOPHILS # BLD: 0.1 K/UL (ref 0–0.1)
BASOPHILS NFR BLD: 1 % (ref 0–1)
BUN SERPL-MCNC: 77 MG/DL (ref 6–20)
BUN SERPL-MCNC: 79 MG/DL (ref 6–20)
BUN/CREAT SERPL: 24 (ref 12–20)
BUN/CREAT SERPL: 24 (ref 12–20)
CA-I BLD-MCNC: 9.2 MG/DL (ref 8.5–10.1)
CA-I BLD-MCNC: 9.6 MG/DL (ref 8.5–10.1)
CHLORIDE SERPL-SCNC: 107 MMOL/L (ref 97–108)
CHLORIDE SERPL-SCNC: 108 MMOL/L (ref 97–108)
CO2 SERPL-SCNC: 23 MMOL/L (ref 21–32)
CO2 SERPL-SCNC: 25 MMOL/L (ref 21–32)
CREAT SERPL-MCNC: 3.16 MG/DL (ref 0.7–1.3)
CREAT SERPL-MCNC: 3.23 MG/DL (ref 0.7–1.3)
DIFFERENTIAL METHOD BLD: ABNORMAL
EOSINOPHIL # BLD: 0.1 K/UL (ref 0–0.4)
EOSINOPHIL NFR BLD: 2 % (ref 0–7)
ERYTHROCYTE [DISTWIDTH] IN BLOOD BY AUTOMATED COUNT: 17.8 % (ref 11.5–14.5)
GLUCOSE SERPL-MCNC: 130 MG/DL (ref 65–100)
GLUCOSE SERPL-MCNC: 140 MG/DL (ref 65–100)
GRAM STN SPEC: NORMAL
GRAM STN SPEC: NORMAL
HCT VFR BLD AUTO: 26.1 % (ref 36.6–50.3)
HGB BLD-MCNC: 8.5 G/DL (ref 12.1–17)
IMM GRANULOCYTES # BLD AUTO: 0.1 K/UL (ref 0–0.04)
IMM GRANULOCYTES NFR BLD AUTO: 1 % (ref 0–0.5)
LYMPHOCYTES # BLD: 0.4 K/UL (ref 0.8–3.5)
LYMPHOCYTES NFR BLD: 6 % (ref 12–49)
Lab: NORMAL
MCH RBC QN AUTO: 31.1 PG (ref 26–34)
MCHC RBC AUTO-ENTMCNC: 32.6 G/DL (ref 30–36.5)
MCV RBC AUTO: 95.6 FL (ref 80–99)
MONOCYTES # BLD: 1.1 K/UL (ref 0–1)
MONOCYTES NFR BLD: 19 % (ref 5–13)
NEUTS SEG # BLD: 4.1 K/UL (ref 1.8–8)
NEUTS SEG NFR BLD: 71 % (ref 32–75)
NRBC # BLD: 0 K/UL (ref 0–0.01)
NRBC BLD-RTO: 0 PER 100 WBC
PHOSPHATE SERPL-MCNC: 5.1 MG/DL (ref 2.6–4.7)
PLATELET # BLD AUTO: 247 K/UL (ref 150–400)
PMV BLD AUTO: 9.5 FL (ref 8.9–12.9)
POTASSIUM SERPL-SCNC: 4.8 MMOL/L (ref 3.5–5.1)
POTASSIUM SERPL-SCNC: 4.8 MMOL/L (ref 3.5–5.1)
RBC # BLD AUTO: 2.73 M/UL (ref 4.1–5.7)
RBC MORPH BLD: ABNORMAL
SODIUM SERPL-SCNC: 138 MMOL/L (ref 136–145)
SODIUM SERPL-SCNC: 141 MMOL/L (ref 136–145)
WBC # BLD AUTO: 5.9 K/UL (ref 4.1–11.1)

## 2024-08-13 PROCEDURE — 2580000003 HC RX 258: Performed by: INTERNAL MEDICINE

## 2024-08-13 PROCEDURE — 84154 ASSAY OF PSA FREE: CPT

## 2024-08-13 PROCEDURE — 99231 SBSQ HOSP IP/OBS SF/LOW 25: CPT | Performed by: STUDENT IN AN ORGANIZED HEALTH CARE EDUCATION/TRAINING PROGRAM

## 2024-08-13 PROCEDURE — 6370000000 HC RX 637 (ALT 250 FOR IP): Performed by: STUDENT IN AN ORGANIZED HEALTH CARE EDUCATION/TRAINING PROGRAM

## 2024-08-13 PROCEDURE — 2580000003 HC RX 258

## 2024-08-13 PROCEDURE — 6370000000 HC RX 637 (ALT 250 FOR IP): Performed by: INTERNAL MEDICINE

## 2024-08-13 PROCEDURE — 85025 COMPLETE CBC W/AUTO DIFF WBC: CPT

## 2024-08-13 PROCEDURE — 6360000002 HC RX W HCPCS

## 2024-08-13 PROCEDURE — 36415 COLL VENOUS BLD VENIPUNCTURE: CPT

## 2024-08-13 PROCEDURE — 80048 BASIC METABOLIC PNL TOTAL CA: CPT

## 2024-08-13 PROCEDURE — 2500000003 HC RX 250 WO HCPCS

## 2024-08-13 PROCEDURE — 1100000000 HC RM PRIVATE

## 2024-08-13 PROCEDURE — 6360000002 HC RX W HCPCS: Performed by: INTERNAL MEDICINE

## 2024-08-13 PROCEDURE — 80069 RENAL FUNCTION PANEL: CPT

## 2024-08-13 PROCEDURE — 82306 VITAMIN D 25 HYDROXY: CPT

## 2024-08-13 PROCEDURE — 84153 ASSAY OF PSA TOTAL: CPT

## 2024-08-13 PROCEDURE — 76770 US EXAM ABDO BACK WALL COMP: CPT

## 2024-08-13 RX ORDER — SODIUM CHLORIDE 9 MG/ML
INJECTION, SOLUTION INTRAVENOUS CONTINUOUS
Status: DISCONTINUED | OUTPATIENT
Start: 2024-08-13 | End: 2024-08-16

## 2024-08-13 RX ADMIN — SODIUM CHLORIDE, PRESERVATIVE FREE 10 ML: 5 INJECTION INTRAVENOUS at 20:48

## 2024-08-13 RX ADMIN — PRIMIDONE 100 MG: 50 TABLET ORAL at 20:47

## 2024-08-13 RX ADMIN — SODIUM BICARBONATE 1300 MG: 650 TABLET ORAL at 20:46

## 2024-08-13 RX ADMIN — HYDRALAZINE HYDROCHLORIDE 25 MG: 25 TABLET ORAL at 08:43

## 2024-08-13 RX ADMIN — ACETAMINOPHEN 650 MG: 325 TABLET ORAL at 18:00

## 2024-08-13 RX ADMIN — SODIUM CHLORIDE: 9 INJECTION, SOLUTION INTRAVENOUS at 15:50

## 2024-08-13 RX ADMIN — PRIMIDONE 100 MG: 50 TABLET ORAL at 10:43

## 2024-08-13 RX ADMIN — HYDRALAZINE HYDROCHLORIDE 25 MG: 25 TABLET ORAL at 15:59

## 2024-08-13 RX ADMIN — SODIUM BICARBONATE 1300 MG: 650 TABLET ORAL at 08:42

## 2024-08-13 RX ADMIN — SODIUM CHLORIDE, PRESERVATIVE FREE 10 ML: 5 INJECTION INTRAVENOUS at 08:44

## 2024-08-13 RX ADMIN — MEGESTROL ACETATE 400 MG: 40 SUSPENSION ORAL at 10:43

## 2024-08-13 RX ADMIN — HYDROMORPHONE HYDROCHLORIDE 0.5 MG: 1 INJECTION, SOLUTION INTRAMUSCULAR; INTRAVENOUS; SUBCUTANEOUS at 00:39

## 2024-08-13 RX ADMIN — SODIUM CHLORIDE 125 MG: 9 INJECTION, SOLUTION INTRAVENOUS at 11:32

## 2024-08-13 RX ADMIN — AMLODIPINE BESYLATE 10 MG: 5 TABLET ORAL at 08:43

## 2024-08-13 RX ADMIN — FOLIC ACID 1 MG: 1 TABLET ORAL at 08:43

## 2024-08-13 RX ADMIN — HYDRALAZINE HYDROCHLORIDE 25 MG: 25 TABLET ORAL at 20:46

## 2024-08-13 RX ADMIN — ATORVASTATIN CALCIUM 20 MG: 20 TABLET, FILM COATED ORAL at 08:42

## 2024-08-13 RX ADMIN — SODIUM BICARBONATE: 84 INJECTION, SOLUTION INTRAVENOUS at 04:20

## 2024-08-13 ASSESSMENT — PAIN DESCRIPTION - LOCATION
LOCATION: ABDOMEN
LOCATION: BACK

## 2024-08-13 ASSESSMENT — PAIN SCALES - GENERAL
PAINLEVEL_OUTOF10: 3
PAINLEVEL_OUTOF10: 2
PAINLEVEL_OUTOF10: 9
PAINLEVEL_OUTOF10: 2
PAINLEVEL_OUTOF10: 7

## 2024-08-13 NOTE — PROGRESS NOTES
Hospitalist Progress Note    NAME:   Abel Cruz   : 1940   MRN: [id number]     Date/Time: 2024 12:23 PM  Patient PCP: Jeremy Grimes MD    Estimated discharge date: 24 to 48 hours  Barriers: Urology planning for cystoscopy in the morning    Hospital course:  Patient is an 83-year-old male with a past medical history of hypertension, hyperlipidemia, tremors, CKD s/p left nephrectomy, prostate cancer and anemia of chronic disease who was admitted on 2024 with an abnormal CT of the chest concerning for malignancy.  CXR revealed right pleural effusion, at least moderate size.  Associated asymptomatic right lung pulmonary edema versus opacities.  CT of the chest from 2024 revealed innumerable tiny miliary nodules throughout both lungs with large pulmonary nodule located within the right lower lobe measuring approximately 6 mm in diameter.  None of pulmonary nodules are amenable to image guided percutaneous biopsy.  Moderately large right pleural effusion with associated right lower lobe atelectasis.  Postoperative changes from remote left nephrectomy.  Pulmonology consulted for right pleural effusion and oncology for concerns of malignancy.  Also, consult urology for right hydronephrosis with history of left nephrectomy and nephrology for evaluation of severe metabolic acidosis with history of CKD.  Nephrology about a patient and recommended providing patient with 1 amp of sodium bicarb now with addition of sodium bicarb tablets 13 mg twice daily and sodium bicarb drip and D5 at 75 cc/h.  Also, recommended placing Kim catheter until urology can evaluate for hydroureteronephrosis of the right kidney.  Urology evaluate patient and planning for cystoscopy with retrograde pyelogram and possible stent placement of the right ureter and kidney on 2024.  Pulmonology about a patient recommended right sided thoracentesis, which was complete on 2024 by IR removing 1500 cc of thin red  pain and hematuria.   Musculoskeletal:  Negative for back pain, neck pain and neck stiffness.   Skin: Negative.    Allergic/Immunologic: Negative.    Neurological:  Negative for dizziness, weakness, numbness and headaches.   Hematological: Negative.    Psychiatric/Behavioral: Negative.     All other systems reviewed and are negative.       PHYSICAL EXAM:  Physical Exam  Vitals reviewed.   Constitutional:       General: He is not in acute distress.     Appearance: He is ill-appearing.   HENT:      Head: Normocephalic and atraumatic.   Eyes:      Extraocular Movements: Extraocular movements intact.   Cardiovascular:      Rate and Rhythm: Normal rate and regular rhythm.   Pulmonary:      Effort: Pulmonary effort is normal. No respiratory distress.      Breath sounds: Normal breath sounds. No wheezing.   Abdominal:      General: Abdomen is flat.      Palpations: Abdomen is soft.      Tenderness: There is no abdominal tenderness.   Genitourinary:     Comments: Indwelling Kim catheter in place  Musculoskeletal:         General: No tenderness. Normal range of motion.      Cervical back: Normal range of motion and neck supple. No tenderness.      Right lower leg: No edema.      Left lower leg: No edema.   Neurological:      Mental Status: He is alert and oriented to person, place, and time.        Reviewed most current lab test results and cultures  YES  Reviewed most current radiology test results   YES  Review and summation of old records today    NO  Reviewed patient's current orders and MAR    YES  PMH/SH reviewed - no change compared to H&P  ________________________________________________________________________  Care Plan discussed with:    Comments   Patient x    Family      RN x    Care Manager     Consultant                        Multidiciplinary team rounds were held today with , nursing, pharmacist and clinical coordinator.  Patient's plan of care was discussed; medications were reviewed and

## 2024-08-13 NOTE — PLAN OF CARE
Problem: Discharge Planning  Goal: Discharge to home or other facility with appropriate resources  Outcome: Progressing     Problem: Pain  Goal: Verbalizes/displays adequate comfort level or baseline comfort level  Outcome: Progressing     Problem: Skin/Tissue Integrity  Goal: Absence of new skin breakdown  Description: 1.  Monitor for areas of redness and/or skin breakdown  2.  Assess vascular access sites hourly  3.  Every 4-6 hours minimum:  Change oxygen saturation probe site  4.  Every 4-6 hours:  If on nasal continuous positive airway pressure, respiratory therapy assess nares and determine need for appliance change or resting period.  Outcome: Progressing     Problem: ABCDS Injury Assessment  Goal: Absence of physical injury  Outcome: Progressing     Problem: Safety - Adult  Goal: Free from fall injury  Outcome: Progressing     Problem: Respiratory - Adult  Goal: Achieves optimal ventilation and oxygenation  Outcome: Progressing     Problem: Skin/Tissue Integrity - Adult  Goal: Skin integrity remains intact  Outcome: Progressing  Goal: Incisions, wounds, or drain sites healing without S/S of infection  Outcome: Progressing     Problem: Genitourinary - Adult  Goal: Absence of urinary retention  Outcome: Progressing  Goal: Urinary catheter remains patent  Outcome: Progressing     Problem: Metabolic/Fluid and Electrolytes - Adult  Goal: Electrolytes maintained within normal limits  Outcome: Progressing  Goal: Hemodynamic stability and optimal renal function maintained  Outcome: Progressing

## 2024-08-13 NOTE — PROGRESS NOTES
drinking.  He is noted to have elevated BUN and creatinine along with hemoglobin of 9.6, on admission.    Plan:   1.  Moderate right pleural effusion,  Differential diagnosis includes benign versus malignant pleural effusion.  It is causing right lower chest discomfort.  He underwent ultrasound-guided right thoracentesis.  About 1500 cc of fluid was drained.  It is exudative pleural effusion with protein 5.5 g, .  Polys 82  We will await for cytology results.  Gram stain has not shown any growth.    2.  Bilateral multiple pulmonary nodules/weight loss:  Could be metastatic disease from colon/kidney.  These pulmonary nodules are tiny and not amenable for bronchoscopy biopsy or CT-guided biopsy.  Will follow-up pleural fluid studies..  3.  Right lower chest pain:  Seems to be from right-sided pleural effusion.  He is on painkillers Dilaudid.  4.  Acute renal failure:  Seems to be at least partially from volume contraction/dehydration.  He has not been eating well for the last couple of weeks.  On admission his BUN was 4.6 and creatinine was 91.  With hydration it is gradually improving.  Nephrology consult and input was appreciated.  Management discussed with patient at bedside.  Thank you for involving me in the management of the patient        This dictation was done by dragon, computer voice recognition software.  Often unanticipated grammatical, syntax, [place] phones and other interpretive errors are inadvertently transcribed.  Please excuse errors that have escaped final proofreading.    BENNY Birch MD  [facility]

## 2024-08-13 NOTE — PLAN OF CARE
Problem: Discharge Planning  Goal: Discharge to home or other facility with appropriate resources  8/12/2024 2016 by Michelle Canales RN  Outcome: Progressing  8/12/2024 1807 by Heather Wang RN  Outcome: Progressing     Problem: Pain  Goal: Verbalizes/displays adequate comfort level or baseline comfort level  8/12/2024 2016 by Michelle Canales RN  Outcome: Progressing  8/12/2024 1807 by Heather Wang RN  Outcome: Progressing     Problem: Skin/Tissue Integrity  Goal: Absence of new skin breakdown  Description: 1.  Monitor for areas of redness and/or skin breakdown  2.  Assess vascular access sites hourly  3.  Every 4-6 hours minimum:  Change oxygen saturation probe site  4.  Every 4-6 hours:  If on nasal continuous positive airway pressure, respiratory therapy assess nares and determine need for appliance change or resting period.  8/12/2024 2016 by Michelle Canales RN  Outcome: Progressing  8/12/2024 1807 by Heather Wang RN  Outcome: Progressing     Problem: ABCDS Injury Assessment  Goal: Absence of physical injury  8/12/2024 2016 by Michelle Canales RN  Outcome: Progressing  8/12/2024 1807 by Heather Wang RN  Outcome: Progressing     Problem: Safety - Adult  Goal: Free from fall injury  8/12/2024 2016 by Michelle Canales RN  Outcome: Progressing  8/12/2024 1807 by Heather Wang RN  Outcome: Progressing     Problem: Respiratory - Adult  Goal: Achieves optimal ventilation and oxygenation  Outcome: Progressing     Problem: Skin/Tissue Integrity - Adult  Goal: Skin integrity remains intact  Outcome: Progressing  Goal: Incisions, wounds, or drain sites healing without S/S of infection  Outcome: Progressing     Problem: Genitourinary - Adult  Goal: Absence of urinary retention  Outcome: Progressing  Goal: Urinary catheter remains patent  Outcome: Progressing     Problem: Metabolic/Fluid and Electrolytes - Adult  Goal: Electrolytes maintained within normal

## 2024-08-13 NOTE — PROGRESS NOTES
toxic-appearing or diaphoretic.   Eyes:      Extraocular Movements: Extraocular movements intact.      Conjunctiva/sclera: Conjunctivae normal.   Cardiovascular:      Rate and Rhythm: Normal rate and regular rhythm.   Pulmonary:      Effort: Pulmonary effort is normal. No respiratory distress.      Breath sounds: No wheezing.   Abdominal:      General: There is no distension.      Palpations: Abdomen is soft. There is no mass.      Tenderness: There is no abdominal tenderness. There is no guarding or rebound.   Musculoskeletal:         General: No deformity or signs of injury.      Cervical back: No rigidity.   Skin:     General: Skin is warm and dry.   Neurological:      General: No focal deficit present.      Mental Status: He is alert and oriented to person, place, and time.   Psychiatric:         Mood and Affect: Mood normal.          Vitals:    08/13/24 0447   BP: 114/83   Pulse: 87   Resp: 14   Temp: 98.1 °F (36.7 °C)   SpO2: 100%         Data Review:       Recent Days:  Sodium   Date/Time Value Ref Range Status   08/12/2024 06:24  136 - 145 mmol/L Final     Potassium   Date/Time Value Ref Range Status   08/12/2024 06:24 AM 4.9 3.5 - 5.1 mmol/L Final     Chloride   Date/Time Value Ref Range Status   08/12/2024 06:24  (H) 97 - 108 mmol/L Final     CO2   Date/Time Value Ref Range Status   08/12/2024 06:24 AM 15 (LL) 21 - 32 mmol/L Final     Comment:     Results verified, phoned to and read back by POPE JHONATAN AT 0[facility] BY [facility]     Hemoglobin   Date/Time Value Ref Range Status   08/12/2024 06:24 AM 8.6 (L) 12.1 - 17.0 g/dL Final     Hematocrit   Date/Time Value Ref Range Status   08/12/2024 06:24 AM 26.5 (L) 36.6 - 50.3 % Final     Platelets   Date/Time Value Ref Range Status   08/12/2024 06:24  150 - 400 K/uL Final      BUN   Date/Time Value Ref Range Status   08/12/2024 06:24 AM 83 (H) 6 - 20 mg/dL Final     Creatinine   Date/Time Value Ref Range Status   08/12/2024 06:24 AM 3.41 (H) 0.70 - 1.30  mg/dL Final             Assessment/     Patient Active Problem List   Diagnosis    Pleural effusion    Hydronephrosis     Solitary Kidney - s/p left nephrectomy for kidney stones  NETO on CKD - continue to monitor creatinine. FENa suggestive of pre-renal NETO  Mild hydronephrosis - plan for cystoscopy and right retrograde pyelogram on 8/14      Marv Malhotra MD

## 2024-08-13 NOTE — PROGRESS NOTES
Comprehensive Nutrition Assessment    Type and Reason for Visit:  Initial, Positive Nutrition Screen    Nutrition Recommendations/Plan:   Continue Current Diet   Encourage adequate intake  If PO intake < 50%, initiate ONS  Monitor PO intake and BM in I/Os     Malnutrition Assessment:  Malnutrition Status:  Mild malnutrition (08/13/24 1209)    Context:  Acute Illness     Findings of the 6 clinical characteristics of malnutrition:  Energy Intake:  Unable to assess  Weight Loss:  Unable to assess     Body Fat Loss:  Unable to assess     Muscle Mass Loss:  Mild muscle mass loss Temples (temporalis), Hand (interosseous)  Fluid Accumulation:  Unable to assess     Strength:  Not Performed    Nutrition Assessment:    RD assessed for MST 2. Pt sleeping at time of visit. Spoke w RN who reports PO intake > 50%. Plan to continue to monitor. Meds include atorvasatatin and folic acid. Abnormal labs include BUN 79, Cr 3.23, GFR 19, Glucose 140, Phos 5.1, Vit B12 > 2000, Albumin 2.4, H/H 8.5/26.1.    Nutrition Related Findings:    NFPE deferred per pt sleeping, Observed as mildly malnourished. Unable to assess wt hx. Per RN no vomting. LBM 8/9. Wound Type:  (Puncture)       Current Nutrition Intake & Therapies:    Average Meal Intake: 26-50%  Average Supplements Intake: None Ordered  ADULT DIET; Regular; Low Fat/Low Chol/High Fiber/KAREN  Diet NPO    Anthropometric Measures:  Height: 167.6 cm (5' 6\")  Ideal Body Weight (IBW): 142 lbs (65 kg)       Current Body Weight: 54.4 kg (119 lb 14.9 oz),   IBW.    Current BMI (kg/m2): 19.4                          BMI Categories: Underweight (BMI less than 22) age over 65    Estimated Daily Nutrient Needs:  Energy Requirements Based On: Kcal/kg  Weight Used for Energy Requirements: Current  Energy (kcal/day): 1630 - 1900 kcal ( 30-35 kcal / cBW)  Weight Used for Protein Requirements: Current  Protein (g/day): 65 g ( 1.2 g / CBW)  Method Used for Fluid Requirements: 1 ml/kcal  Fluid  (ml/day): 1630 - 1900 ml/kcal    Nutrition Diagnosis:   Underweight related to increase demand for energy/nutrients as evidenced by BMI    Nutrition Interventions:   Food and/or Nutrient Delivery: Continue Current Diet  Nutrition Education/Counseling: No recommendation at this time  Coordination of Nutrition Care: Continue to monitor while inpatient  Plan of Care discussed with: RN    Goals:     Goals: PO intake 75% or greater, by next RD assessment       Nutrition Monitoring and Evaluation:   Behavioral-Environmental Outcomes: None Identified  Food/Nutrient Intake Outcomes: Food and Nutrient Intake  Physical Signs/Symptoms Outcomes: Nutrition Focused Physical Findings, Weight    Discharge Planning:    Too soon to determine     Judith Alva RD  Contact: [phone] or [email or web address]

## 2024-08-13 NOTE — CARE COORDINATION
CM reviewed chart. Heme/onc, Pulmonology, Nephrology and Urology following.     Plan for cystoscopy and right retrograde pyelogram on 8/14     Pt resides in [place], [place]    DCP: home, family will transport    CM will continue to follow

## 2024-08-13 NOTE — PROGRESS NOTES
NAME:  Abel Cruz   :   1940   MRN:   [id number]     ATTENDING: Nils Malik Cha, MD  PCP:  Jeremy Grimes MD    Date/Time:  2024       Subjective:   REQUESTING PHYSICIAN:  Dr. Che  REASON FOR CONSULT:   NETO    History of presenting illness:    Patient seen in the room.  He is complaining of his\" chest and ribs\" hurting every time he breathes.  Otherwise denies any complaints.  Renal function is improving      Past Medical History:   Diagnosis Date    Hypertension       Past Surgical History:   Procedure Laterality Date    KIDNEY REMOVAL      over 20 years ago     Social History     Tobacco Use    Smoking status: Former     Types: Cigarettes    Smokeless tobacco: Not on file   Substance Use Topics    Alcohol use: Not on file      History reviewed. No pertinent family history.    Allergies   Allergen Reactions    Naproxen Other (See Comments)     Gastric Ulcer With Hemorrhage.    Lisinopril Nausea And Vomiting    Morphine Rash      Prior to Admission medications    Medication Sig Start Date End Date Taking? Authorizing Provider   latanoprost (XALATAN) 0.005 % ophthalmic solution Place 1 drop into both eyes nightly   Yes Mitesh Daily MD   Calcium Citrate-Vitamin D (SM CALCIUM CITRATE W/VIT D3 PO) Take 1,000 mg by mouth Daily   Yes Mitesh Daily MD   lidocaine (BLUE-EMU PAIN RELIEF DRY) 4 % external patch Place 1 patch onto the skin daily   Yes Mitesh Daily MD   ferrous sulfate (IRON 325) 325 (65 Fe) MG tablet Take 1 tablet by mouth daily (with breakfast)   Yes Mitesh Daily MD   atorvastatin (LIPITOR) 40 MG tablet Take 0.5 tablets by mouth daily 24  Yes Mitesh Daily MD   cyanocobalamin 500 MCG tablet Take 2 tablets by mouth daily   Yes Mitesh Daily MD   hydrALAZINE (APRESOLINE) 25 MG tablet Take 1 tablet by mouth 3 times daily   Yes Mitesh Daily MD   megestrol (MEGACE) 40 MG/ML suspension Take 10 mLs by mouth daily 24  Yes Abimbola

## 2024-08-14 ENCOUNTER — APPOINTMENT (OUTPATIENT)
Facility: HOSPITAL | Age: 84
DRG: 204 | End: 2024-08-14
Payer: OTHER GOVERNMENT

## 2024-08-14 LAB
ANION GAP SERPL CALC-SCNC: 8 MMOL/L (ref 5–15)
APPEARANCE UR: CLEAR
BACTERIA URNS QL MICRO: NEGATIVE /HPF
BASOPHILS # BLD: 0 K/UL (ref 0–0.1)
BASOPHILS NFR BLD: 1 % (ref 0–1)
BILIRUB UR QL: NEGATIVE
BUN SERPL-MCNC: 77 MG/DL (ref 6–20)
BUN/CREAT SERPL: 25 (ref 12–20)
CA-I BLD-MCNC: 8.7 MG/DL (ref 8.5–10.1)
CA-I BLD-MCNC: 8.8 MG/DL (ref 8.5–10.1)
CHLORIDE SERPL-SCNC: 105 MMOL/L (ref 97–108)
CO2 SERPL-SCNC: 26 MMOL/L (ref 21–32)
COLOR UR: ABNORMAL
CREAT SERPL-MCNC: 3.06 MG/DL (ref 0.7–1.3)
CREAT UR-MCNC: 83 MG/DL
CYTOLOGY-NON GYN: NORMAL
DIFFERENTIAL METHOD BLD: ABNORMAL
ECHO AO ASC DIAM: 3.9 CM
ECHO AO ASCENDING AORTA INDEX: 2.42 CM/M2
ECHO AO ROOT DIAM: 3.4 CM
ECHO AO ROOT INDEX: 2.11 CM/M2
ECHO AV AREA PEAK VELOCITY: 2.4 CM2
ECHO AV AREA/BSA PEAK VELOCITY: 1.5 CM2/M2
ECHO AV PEAK GRADIENT: 4 MMHG
ECHO AV PEAK VELOCITY: 1 M/S
ECHO AV VELOCITY RATIO: 0.7
ECHO BSA: 1.59 M2
ECHO IVC EXP: 1 CM
ECHO LA AREA 2C: 15.6 CM2
ECHO LA AREA 4C: 20.9 CM2
ECHO LA DIAMETER INDEX: 1.43 CM/M2
ECHO LA DIAMETER: 2.3 CM
ECHO LA MAJOR AXIS: 6.7 CM
ECHO LA MINOR AXIS: 5.7 CM
ECHO LA TO AORTIC ROOT RATIO: 0.68
ECHO LA VOL BP: 46 ML (ref 18–58)
ECHO LA VOL MOD A2C: 36 ML (ref 18–58)
ECHO LA VOL MOD A4C: 52 ML (ref 18–58)
ECHO LA VOL/BSA BIPLANE: 29 ML/M2 (ref 16–34)
ECHO LA VOLUME INDEX MOD A2C: 22 ML/M2 (ref 16–34)
ECHO LA VOLUME INDEX MOD A4C: 32 ML/M2 (ref 16–34)
ECHO LV E' LATERAL VELOCITY: 7 CM/S
ECHO LV E' SEPTAL VELOCITY: 6 CM/S
ECHO LV FRACTIONAL SHORTENING: 2 % (ref 28–44)
ECHO LV INTERNAL DIMENSION DIASTOLE INDEX: 2.8 CM/M2
ECHO LV INTERNAL DIMENSION DIASTOLIC: 4.5 CM (ref 4.2–5.9)
ECHO LV INTERNAL DIMENSION SYSTOLIC INDEX: 2.73 CM/M2
ECHO LV INTERNAL DIMENSION SYSTOLIC: 4.4 CM
ECHO LV IVSD: 1.3 CM (ref 0.6–1)
ECHO LV MASS 2D: 235.3 G (ref 88–224)
ECHO LV MASS INDEX 2D: 146.2 G/M2 (ref 49–115)
ECHO LV POSTERIOR WALL DIASTOLIC: 1.4 CM (ref 0.6–1)
ECHO LV RELATIVE WALL THICKNESS RATIO: 0.62
ECHO LVOT AREA: 3.8 CM2
ECHO LVOT DIAM: 2.2 CM
ECHO LVOT PEAK GRADIENT: 2 MMHG
ECHO LVOT PEAK VELOCITY: 0.7 M/S
ECHO MV A VELOCITY: 0.53 M/S
ECHO MV E DECELERATION TIME (DT): 152 MS
ECHO MV E VELOCITY: 0.3 M/S
ECHO MV E/A RATIO: 0.57
ECHO MV E/E' LATERAL: 4.29
ECHO MV E/E' RATIO (AVERAGED): 4.64
ECHO MV E/E' SEPTAL: 5
ECHO PULMONARY ARTERY END DIASTOLIC PRESSURE: 7 MMHG
ECHO PV ACCELERATION TIME (AT): 71 MS
ECHO PV MAX VELOCITY: 0.8 M/S
ECHO PV PEAK GRADIENT: 3 MMHG
ECHO PV REGURGITANT MAX VELOCITY: 1.3 M/S
ECHO RA AREA 4C: 11.9 CM2
ECHO RA END SYSTOLIC VOLUME APICAL 4 CHAMBER INDEX BSA: 17 ML/M2
ECHO RA VOLUME: 28 ML
ECHO RV BASAL DIMENSION: 2.9 CM
ECHO RV FREE WALL PEAK S': 15 CM/S
ECHO RV TAPSE: 1.5 CM (ref 1.7–?)
ECHO TV REGURGITANT MAX VELOCITY: 2.68 M/S
ECHO TV REGURGITANT PEAK GRADIENT: 29 MMHG
EOSINOPHIL # BLD: 0.1 K/UL (ref 0–0.4)
EOSINOPHIL NFR BLD: 2 % (ref 0–7)
EPITH CASTS URNS QL MICRO: ABNORMAL /LPF
ERYTHROCYTE [DISTWIDTH] IN BLOOD BY AUTOMATED COUNT: 17.9 % (ref 11.5–14.5)
GLUCOSE SERPL-MCNC: 112 MG/DL (ref 65–100)
GLUCOSE UR STRIP.AUTO-MCNC: NEGATIVE MG/DL
HCT VFR BLD AUTO: 24.6 % (ref 36.6–50.3)
HGB BLD-MCNC: 8.1 G/DL (ref 12.1–17)
HGB UR QL STRIP: ABNORMAL
IMM GRANULOCYTES # BLD AUTO: 0 K/UL (ref 0–0.04)
IMM GRANULOCYTES NFR BLD AUTO: 1 % (ref 0–0.5)
KETONES UR QL STRIP.AUTO: NEGATIVE MG/DL
LEUKOCYTE ESTERASE UR QL STRIP.AUTO: ABNORMAL
LYMPHOCYTES # BLD: 0.4 K/UL (ref 0.8–3.5)
LYMPHOCYTES NFR BLD: 7 % (ref 12–49)
MAGNESIUM SERPL-MCNC: 2.1 MG/DL (ref 1.6–2.4)
MCH RBC QN AUTO: 31.4 PG (ref 26–34)
MCHC RBC AUTO-ENTMCNC: 32.9 G/DL (ref 30–36.5)
MCV RBC AUTO: 95.3 FL (ref 80–99)
MONOCYTES # BLD: 0.9 K/UL (ref 0–1)
MONOCYTES NFR BLD: 17 % (ref 5–13)
MUCOUS THREADS URNS QL MICRO: ABNORMAL /LPF
NEUTS SEG # BLD: 3.8 K/UL (ref 1.8–8)
NEUTS SEG NFR BLD: 72 % (ref 32–75)
NITRITE UR QL STRIP.AUTO: NEGATIVE
NRBC # BLD: 0 K/UL (ref 0–0.01)
NRBC BLD-RTO: 0 PER 100 WBC
PH UR STRIP: 7 (ref 5–8)
PLATELET # BLD AUTO: 225 K/UL (ref 150–400)
PMV BLD AUTO: 9.6 FL (ref 8.9–12.9)
POTASSIUM SERPL-SCNC: 3.9 MMOL/L (ref 3.5–5.1)
PROT UR STRIP-MCNC: 30 MG/DL
PROT UR-MCNC: 58 MG/DL (ref 0–11.9)
PROT/CREAT UR-RTO: 0.7
PTH-INTACT SERPL-MCNC: 272.6 PG/ML (ref 18.4–88)
RBC # BLD AUTO: 2.58 M/UL (ref 4.1–5.7)
RBC #/AREA URNS HPF: ABNORMAL /HPF (ref 0–5)
SODIUM SERPL-SCNC: 139 MMOL/L (ref 136–145)
SP GR UR REFRACTOMETRY: 1.01 (ref 1–1.03)
TROPONIN I SERPL HS-MCNC: 33 NG/L (ref 0–76)
TROPONIN I SERPL HS-MCNC: 38 NG/L (ref 0–76)
TSH SERPL DL<=0.05 MIU/L-ACNC: 2.1 UIU/ML (ref 0.36–3.74)
UROBILINOGEN UR QL STRIP.AUTO: 0.1 EU/DL (ref 0.1–1)
WBC # BLD AUTO: 5.2 K/UL (ref 4.1–11.1)
WBC URNS QL MICRO: ABNORMAL /HPF (ref 0–4)

## 2024-08-14 PROCEDURE — 85025 COMPLETE CBC W/AUTO DIFF WBC: CPT

## 2024-08-14 PROCEDURE — 6370000000 HC RX 637 (ALT 250 FOR IP): Performed by: INTERNAL MEDICINE

## 2024-08-14 PROCEDURE — 6360000002 HC RX W HCPCS

## 2024-08-14 PROCEDURE — 84156 ASSAY OF PROTEIN URINE: CPT

## 2024-08-14 PROCEDURE — 83735 ASSAY OF MAGNESIUM: CPT

## 2024-08-14 PROCEDURE — 84484 ASSAY OF TROPONIN QUANT: CPT

## 2024-08-14 PROCEDURE — 81001 URINALYSIS AUTO W/SCOPE: CPT

## 2024-08-14 PROCEDURE — 2580000003 HC RX 258: Performed by: INTERNAL MEDICINE

## 2024-08-14 PROCEDURE — 93306 TTE W/DOPPLER COMPLETE: CPT

## 2024-08-14 PROCEDURE — 6370000000 HC RX 637 (ALT 250 FOR IP): Performed by: NURSE PRACTITIONER

## 2024-08-14 PROCEDURE — 2500000003 HC RX 250 WO HCPCS: Performed by: STUDENT IN AN ORGANIZED HEALTH CARE EDUCATION/TRAINING PROGRAM

## 2024-08-14 PROCEDURE — 83970 ASSAY OF PARATHORMONE: CPT

## 2024-08-14 PROCEDURE — 80048 BASIC METABOLIC PNL TOTAL CA: CPT

## 2024-08-14 PROCEDURE — 82570 ASSAY OF URINE CREATININE: CPT

## 2024-08-14 PROCEDURE — 6370000000 HC RX 637 (ALT 250 FOR IP): Performed by: STUDENT IN AN ORGANIZED HEALTH CARE EDUCATION/TRAINING PROGRAM

## 2024-08-14 PROCEDURE — 2060000000 HC ICU INTERMEDIATE R&B

## 2024-08-14 PROCEDURE — 2580000003 HC RX 258

## 2024-08-14 PROCEDURE — 93005 ELECTROCARDIOGRAM TRACING: CPT

## 2024-08-14 PROCEDURE — 36415 COLL VENOUS BLD VENIPUNCTURE: CPT

## 2024-08-14 PROCEDURE — 99232 SBSQ HOSP IP/OBS MODERATE 35: CPT | Performed by: STUDENT IN AN ORGANIZED HEALTH CARE EDUCATION/TRAINING PROGRAM

## 2024-08-14 PROCEDURE — 84443 ASSAY THYROID STIM HORMONE: CPT

## 2024-08-14 RX ORDER — METOPROLOL TARTRATE 1 MG/ML
10 INJECTION, SOLUTION INTRAVENOUS ONCE
Status: COMPLETED | OUTPATIENT
Start: 2024-08-14 | End: 2024-08-14

## 2024-08-14 RX ORDER — SODIUM BICARBONATE 650 MG/1
650 TABLET ORAL 2 TIMES DAILY
Status: DISCONTINUED | OUTPATIENT
Start: 2024-08-14 | End: 2024-08-20

## 2024-08-14 RX ORDER — METOPROLOL TARTRATE 25 MG/1
25 TABLET, FILM COATED ORAL 2 TIMES DAILY
Status: DISCONTINUED | OUTPATIENT
Start: 2024-08-14 | End: 2024-08-15

## 2024-08-14 RX ORDER — ADENOSINE 3 MG/ML
6 INJECTION, SOLUTION INTRAVENOUS ONCE
Status: DISCONTINUED | OUTPATIENT
Start: 2024-08-14 | End: 2024-08-14

## 2024-08-14 RX ADMIN — METOPROLOL TARTRATE 25 MG: 25 TABLET, FILM COATED ORAL at 21:53

## 2024-08-14 RX ADMIN — SODIUM CHLORIDE, PRESERVATIVE FREE 10 ML: 5 INJECTION INTRAVENOUS at 09:57

## 2024-08-14 RX ADMIN — PRIMIDONE 100 MG: 50 TABLET ORAL at 21:53

## 2024-08-14 RX ADMIN — ATORVASTATIN CALCIUM 20 MG: 20 TABLET, FILM COATED ORAL at 09:56

## 2024-08-14 RX ADMIN — METOPROLOL TARTRATE 10 MG: 1 INJECTION, SOLUTION INTRAVENOUS at 11:47

## 2024-08-14 RX ADMIN — MEGESTROL ACETATE 400 MG: 40 SUSPENSION ORAL at 09:55

## 2024-08-14 RX ADMIN — SODIUM BICARBONATE 650 MG: 650 TABLET ORAL at 21:53

## 2024-08-14 RX ADMIN — FOLIC ACID 1 MG: 1 TABLET ORAL at 09:56

## 2024-08-14 RX ADMIN — PRIMIDONE 100 MG: 50 TABLET ORAL at 09:56

## 2024-08-14 RX ADMIN — SODIUM CHLORIDE 125 MG: 9 INJECTION, SOLUTION INTRAVENOUS at 11:00

## 2024-08-14 RX ADMIN — HYDRALAZINE HYDROCHLORIDE 25 MG: 25 TABLET ORAL at 21:53

## 2024-08-14 RX ADMIN — SODIUM BICARBONATE 1300 MG: 650 TABLET ORAL at 09:55

## 2024-08-14 RX ADMIN — PROPRANOLOL HYDROCHLORIDE 80 MG: 80 CAPSULE, EXTENDED RELEASE ORAL at 09:55

## 2024-08-14 RX ADMIN — AMLODIPINE BESYLATE 10 MG: 5 TABLET ORAL at 09:55

## 2024-08-14 RX ADMIN — HYDRALAZINE HYDROCHLORIDE 25 MG: 25 TABLET ORAL at 09:58

## 2024-08-14 RX ADMIN — SODIUM CHLORIDE: 9 INJECTION, SOLUTION INTRAVENOUS at 10:04

## 2024-08-14 ASSESSMENT — PAIN DESCRIPTION - LOCATION: LOCATION: GENERALIZED

## 2024-08-14 ASSESSMENT — PAIN DESCRIPTION - FREQUENCY: FREQUENCY: INTERMITTENT

## 2024-08-14 ASSESSMENT — PAIN DESCRIPTION - ORIENTATION: ORIENTATION: RIGHT;LEFT

## 2024-08-14 ASSESSMENT — PAIN SCALES - GENERAL: PAINLEVEL_OUTOF10: 7

## 2024-08-14 ASSESSMENT — PAIN DESCRIPTION - ONSET: ONSET: ON-GOING

## 2024-08-14 ASSESSMENT — PAIN DESCRIPTION - PAIN TYPE: TYPE: ACUTE PAIN

## 2024-08-14 ASSESSMENT — PAIN DESCRIPTION - DESCRIPTORS: DESCRIPTORS: ACHING

## 2024-08-14 NOTE — PROGRESS NOTES
24hrs VS reviewed since prior progress note. Most recent are:  Patient Vitals for the past 24 hrs:   BP Temp Temp src Pulse Resp SpO2   08/14/24 1245 118/80 97.9 °F (36.6 °C) Oral 87 18 99 %   08/14/24 1230 108/76 -- -- (!) 153 -- --   08/14/24 1159 107/75 -- -- (!) 151 -- --   08/14/24 1148 108/74 -- -- (!) 161 -- 99 %   08/14/24 1130 116/80 97.1 °F (36.2 °C) Oral (!) 162 18 92 %   08/14/24 0752 124/72 98.8 °F (37.1 °C) Oral 97 18 98 %   08/14/24 0257 106/81 98.6 °F (37 °C) Oral 100 14 99 %   08/13/24 2030 100/83 98.4 °F (36.9 °C) Oral 67 18 97 %   08/13/24 1554 112/77 98.2 °F (36.8 °C) Oral 100 20 97 %   08/13/24 1353 112/75 98.5 °F (36.9 °C) Oral 90 18 96 %         Intake/Output Summary (Last 24 hours) at 8/14/2024 1255  Last data filed at 8/14/2024 0639  Gross per 24 hour   Intake --   Output 450 ml   Net -450 ml        I had a face to face encounter and independently examined this patient on 8/14/2024, as outlined below:    Review of Systems   Constitutional:  Negative for activity change, appetite change, chills and fever.   HENT:  Negative for congestion, rhinorrhea, sore throat and trouble swallowing.    Eyes: Negative.    Respiratory:  Negative for cough, shortness of breath and wheezing.    Cardiovascular:  Positive for chest pain. Negative for palpitations and leg swelling.   Gastrointestinal:  Negative for abdominal pain, diarrhea, nausea and vomiting.   Endocrine: Negative.    Genitourinary:  Negative for dysuria, flank pain and hematuria.   Musculoskeletal:  Negative for back pain, neck pain and neck stiffness.   Skin: Negative.    Allergic/Immunologic: Negative.    Neurological:  Negative for dizziness, weakness, numbness and headaches.   Hematological: Negative.    Psychiatric/Behavioral: Negative.     All other systems reviewed and are negative.       PHYSICAL EXAM:  Physical Exam  Vitals reviewed.   Constitutional:       General: He is not in acute distress.     Appearance: He is ill-appearing.    HENT:      Head: Normocephalic and atraumatic.   Eyes:      Extraocular Movements: Extraocular movements intact.   Cardiovascular:      Rate and Rhythm: Regular rhythm. Tachycardia present.   Pulmonary:      Effort: Pulmonary effort is normal. No respiratory distress.      Breath sounds: Normal breath sounds. No wheezing.   Abdominal:      General: Abdomen is flat.      Palpations: Abdomen is soft.      Tenderness: There is no abdominal tenderness.   Genitourinary:     Comments: Indwelling Kim catheter in place  Musculoskeletal:         General: No tenderness. Normal range of motion.      Cervical back: Normal range of motion and neck supple. No tenderness.      Right lower leg: No edema.      Left lower leg: No edema.   Neurological:      Mental Status: He is alert and oriented to person, place, and time.        Reviewed most current lab test results and cultures  YES  Reviewed most current radiology test results   YES  Review and summation of old records today    NO  Reviewed patient's current orders and MAR    YES  PMH/ reviewed - no change compared to H&P  ________________________________________________________________________  Care Plan discussed with:    Comments   Patient x    Family      RN x    Care Manager     Consultant                        Multidiciplinary team rounds were held today with , nursing, pharmacist and clinical coordinator.  Patient's plan of care was discussed; medications were reviewed and discharge planning was addressed.     ________________________________________________________________________  Total NON critical care TIME:  35  Minutes    Total CRITICAL CARE TIME Spent:   Minutes non procedure based      Comments   >50% of visit spent in counseling and coordination of care     ________________________________________________________________________  Claus Yanes PA-C     Procedures: see electronic medical records for all procedures/Xrays and details which were

## 2024-08-14 NOTE — PROGRESS NOTES
Spoke with JHONATAN Goldsmith, advised elevated DI score, patient getting EKG to further evaluate symtoms.

## 2024-08-14 NOTE — PROGRESS NOTES
Pulmonary/ CC progress note    Subjective:     Mr. Abel Cruz is an 83 years old -American male who is known to have history of hypertension, he also has history of left nephrectomy in the past for recurrent kidney stones..  Patient is referred by her family physician because of increasing right lower chest discomfort on coughing and taking deep breath.  This has been going on for last week to 10 days.  His pain is quite intense 7 out of 10 intensity.  Patient has lost his appetite and has lost about 10 to 15 pounds over last couple of months.  He lives by himself.  He noted that he lives in [facility].  About a month ago he had CT scan of his spine done for his back pain and right lower chest pains.  He was informed that he has spots on his lung and fluid around his lung and he was getting set for evaluation by specialist for these issues.    He has now ended up in the hospital.  He has poor vision.  Had complaints of some vague abdominal discomfort.  He is ex-smoker who quit smoking in 1980.  He smoked for about 25 years 1 pack/day before he quit smoking.  He also drinks on regular basis but last drink was about a month ago.    Initial workup showed tiny bilateral multiple pulmonary nodules along with moderate-sized right pleural effusion.  He is also noted to have elevated urine creatinine of 90 and 4.16 on admission.  Noted to have hemoglobin of 9.6 g.  He is not sure about altered bowel movements.    8/13/2024  Patient examined at bedside.  Still has complaints of pain in the right lower costal area.  Denies any respiratory distress.  Had thoracentesis done about 1500 cc of fluid was drained.  8/14/24  Patient examined at bedside  Without any distress  Pleuritic chest pain has improved.  Appetite is marginal.    Patient Active Problem List   Diagnosis    Pleural effusion    Hydronephrosis     Past Medical History:   Diagnosis Date    Hypertension       History reviewed. No pertinent family  24  0732 24  0553   WBC 6.8 5.9 5.2   HGB 8.6* 8.5* 8.1*   HCT 26.5* 26.1* 24.6*    247 225     Recent Labs     24  0624 24  0732 24  0553    141  138 139   K 4.9 4.8  4.8 3.9   * 108  107 105   CO2 15* 25  23 26   GLUCOSE 119* 140*  130* 112*   BUN 83* 79*  77* 77*   CREATININE 3.41* 3.23*  3.16* 3.06*   CALCIUM 9.6 9.6  9.2 8.7   MG  --   --  2.1   PHOS  --  5.1*  --            Objective:   Blood pressure 108/75, pulse 79, temperature 97.9 °F (36.6 °C), temperature source Oral, resp. rate 18, height 1.676 m (5' 6\"), weight 54.4 kg (120 lb), SpO2 99%.  Temp (24hrs), Av.2 °F (36.8 °C), Min:97.1 °F (36.2 °C), Max:98.8 °F (37.1 °C)    US RETROPERITONEAL COMPLETE   Final Result      1. Increased echogenicity of the right kidney consistent with the clinical   history of medical renal parenchymal disease.   2. Mild right hydronephrosis.   3. Right renal cortical cysts.   4. Left kidney not imaged by the sonographer.   5. Urinary bladder not assessed.   6. Abdominal aortic aneurysm, 5.6 cm caliber.      Electronically signed by JACKIE DE LA GARZA      XR CHEST PORTABLE   Final Result   1. No pneumothorax      Electronically signed by SANTIAGO GARCIA      IR GUIDED THORACENTESIS PLEURAL   Final Result   Successful ultrasound-guided thoracentesis   Approximately 1 1.5 L of mildly sanguinous fluid evacuated from the right   pleural space.               Electronically signed by DALIA TREJO         Data Review:   Current Facility-Administered Medications   Medication Dose Route Frequency    0.9 % sodium chloride infusion   IntraVENous Continuous    epoetin paul-epbx (RETACRIT) injection 10,000 Units  10,000 Units SubCUTAneous Weekly    propranolol (INDERAL LA) extended release capsule 80 mg  80 mg Oral Daily    sodium bicarbonate tablet 1,300 mg  1,300 mg Oral BID    folic acid (FOLVITE) tablet 1 mg  1 mg Oral Daily    sodium chloride flush 0.9 % injection 5-40 mL

## 2024-08-14 NOTE — PROGRESS NOTES
4 Eyes Skin Assessment     NAME:  Abel Cruz  YOB: 1940  MEDICAL RECORD NUMBER:  [id number]    The patient is being assessed for  Admission    I agree that at least one RN has performed a thorough Head to Toe Skin Assessment on the patient. ALL assessment sites listed below have been assessed.      Areas assessed by both nurses:    Head, Face, Ears, Shoulders, Back, Chest, Arms, Elbows, Hands, Sacrum. Buttock, Coccyx, Ischium, Legs. Feet and Heels, and Under Medical Devices         Does the Patient have a Wound? Yes  Dry healing scab wounds on both arms and both feet         Raymon Prevention initiated by RN: Yes  Wound Care Orders initiated by RN: Yes    Pressure Injury (Stage 3,4, Unstageable, DTI, NWPT, and Complex wounds) if present, place Wound referral order by RN under : No    New Ostomies, if present place, Ostomy referral order under : No     Nurse 1 eSignature: Electronically signed by Agatha Mitchell RN on 8/14/24 at 6:08 PM EDT    **SHARE this note so that the co-signing nurse can place an eSignature**    Nurse 2 eSignature: Electronically signed by Charan Prince RN on 8/14/24 at 6:18 PM EDT

## 2024-08-14 NOTE — PROGRESS NOTES
4 Eyes Skin Assessment     NAME:  Abel Cruz  YOB: 1940  MEDICAL RECORD NUMBER:  [id number]    The patient is being assessed for  Other   Weekly Skin Assessment    I agree that at least one RN has performed a thorough Head to Toe Skin Assessment on the patient. ALL assessment sites listed below have been assessed.      Areas assessed by both nurses:    Head, Face, Ears, Shoulders, Back, Chest, Arms, Elbows, Hands, Sacrum. Buttock, Coccyx, Ischium, Legs. Feet and Heels, Under Medical Devices , and Other          Does the Patient have a Wound? abrasion       Raymon Prevention initiated by RN: Yes  Wound Care Orders initiated by RN: No    Pressure Injury (Stage 3,4, Unstageable, DTI, NWPT, and Complex wounds) if present, place Wound referral order by RN under : No    New Ostomies, if present place, Ostomy referral order under : No     Nurse 1 eSignature: Electronically signed by Antonio Rodriguez RN on 8/14/24 at 2:02 AM EDT    **SHARE this note so that the co-signing nurse can place an eSignature**    Nurse 2 eSignature: Electronically signed by Michelle Canales RN on 8/14/24 at 2:12 AM EDT

## 2024-08-14 NOTE — PROGRESS NOTES
Accidental loss of IV access; patient refused new IV access to be inserted despite discussing with the NOD about the need to give hydration via ordered IVF. Patient fully alert and oriented.    1900H informed night NOD.

## 2024-08-14 NOTE — CONSULTS
CARDIOLOGY CONSULTATION    REASON FOR CONSULT: SVT    REQUESTING PROVIDER: Claus Yanes PA-C     CHIEF COMPLAINT:  chest pain    HISTORY OF PRESENT ILLNESS:  Abel Cruz is a 83 y.o. year-old male with past medical history significant for hypertension, hyperlipidemia, tremors, CKD s/p left nephrectomy, prostate cancer, chronic anemia who was evaluated today due to SVT. He originally presented to the ED with complaints of pleuritic chest pain, cough. Work up has revealed diffuse pulmonary nodules, right pleural effusion s/p thoracentesis removal of 1500cc of fluid, mild hydronephrosis. He was scheduled for cystoscopy today w/ urology which has been postponed due to SVT. Nursing states hiccups have been persistent over the past few days. He had no ectopy prior to the sVT this morning. HR 180s, given IV metoprolol, improved to 160s. The arrhythmia resolved without further intervention after several minutes. He states he could not feel his heart racing. He states the only chest pain he feels is with cough, deep breathing and hiccups.       Records from hospital admission course thus far reviewed.      Telemetry reviewed.  No events overnight. SVT onset  at approximately 1124am, lasting approx an hour     INPATIENT MEDICATIONS:  Home medications reviewed.    Current Facility-Administered Medications:     0.9 % sodium chloride infusion, , IntraVENous, Continuous, Therese Merritt MD, Last Rate: 50 mL/hr at 08/14/24 1004, New Bag at 08/14/24 1004    epoetin paul-epbx (RETACRIT) injection 10,000 Units, 10,000 Units, SubCUTAneous, Weekly, Therese Merritt MD    propranolol (INDERAL LA) extended release capsule 80 mg, 80 mg, Oral, Daily, [facility], 80 mg at 08/14/24 0955    sodium bicarbonate tablet 1,300 mg, 1,300 mg, Oral, BID, Therese Merritt MD, 1,300 mg at 08/14/24 0955    folic acid (FOLVITE) tablet 1 mg, 1 mg, Oral, Daily, Claus Yanes PA-C, 1 mg at 08/14/24 0956    sodium chloride flush 0.9 % injection

## 2024-08-14 NOTE — PROGRESS NOTES
Surgical consent obtained. Pt states no family to come. Stated we could add his daughter to text messaging, however, daughter's number not listed in chart and patient didn't know her number. Tracking card left with patient and also given to [facility]. Signed surgical consent and signed laterality forms are in patient's chart. Patient still needs anesthesia consent and addendum to DNR signed.    Statement Selected

## 2024-08-14 NOTE — PROGRESS NOTES
At this time patient no longer in SVT heart rate 80-90 bpm. Hold adenosine at this time.  Patient will transfer to  when bed available

## 2024-08-14 NOTE — PROGRESS NOTES
UROLOGY Progress Note          [phone]      Daily Progress Note: 8/14/2024      Subjective:   The patient is seen for UROLOGIC follow up. Patient seen at bedside without flank pain. Patient was in active SVT and plan for urgent transfer to different bed to receive adenosine.     Problem List:  Patient Active Problem List   Diagnosis    Pleural effusion    Hydronephrosis         Medications reviewed  Current Facility-Administered Medications   Medication Dose Route Frequency    0.9 % sodium chloride infusion   IntraVENous Continuous    epoetin paul-epbx (RETACRIT) injection 10,000 Units  10,000 Units SubCUTAneous Weekly    propranolol (INDERAL LA) extended release capsule 80 mg  80 mg Oral Daily    sodium bicarbonate tablet 1,300 mg  1,300 mg Oral BID    folic acid (FOLVITE) tablet 1 mg  1 mg Oral Daily    sodium chloride flush 0.9 % injection 5-40 mL  5-40 mL IntraVENous 2 times per day    sodium chloride flush 0.9 % injection 5-40 mL  5-40 mL IntraVENous PRN    0.9 % sodium chloride infusion   IntraVENous PRN    ondansetron (ZOFRAN-ODT) disintegrating tablet 4 mg  4 mg Oral Q8H PRN    Or    ondansetron (ZOFRAN) injection 4 mg  4 mg IntraVENous Q6H PRN    polyethylene glycol (GLYCOLAX) packet 17 g  17 g Oral Daily PRN    acetaminophen (TYLENOL) tablet 650 mg  650 mg Oral Q6H PRN    Or    acetaminophen (TYLENOL) suppository 650 mg  650 mg Rectal Q6H PRN    amLODIPine (NORVASC) tablet 10 mg  10 mg Oral Daily    atorvastatin (LIPITOR) tablet 20 mg  20 mg Oral Daily    hydrALAZINE (APRESOLINE) tablet 25 mg  25 mg Oral TID    megestrol (MEGACE) 40 MG/ML suspension 400 mg  400 mg Oral Daily    primidone (MYSOLINE) tablet 100 mg  100 mg Oral BID       Review of Systems:   Review of Systems          Objective:   Physical Exam     Vitals:    08/14/24 1245   BP: 118/80   Pulse: 87   Resp: 18   Temp: 97.9 °F (36.6 °C)   SpO2: 99%         Data Review:       Recent Days:  Sodium   Date/Time Value Ref Range Status

## 2024-08-14 NOTE — PROGRESS NOTES
NAME:  Abel Cruz   :   1940   MRN:   [id number]     ATTENDING: Nils Malik Cha, MD  PCP:  Jeremy Grimes MD    Date/Time:  2024       Subjective:   REQUESTING PHYSICIAN:  Dr. Che  REASON FOR CONSULT:   NETO    History of presenting illness:    Patient seen in the room.  His surgery today was canceled because of ventricular arrhythmia.  Creatinine has improved to 3.1   He is complaining of his\" chest and ribs\" hurting every time he breathes.  Otherwise denies any complaints.  Renal function is improving      Past Medical History:   Diagnosis Date    Hypertension       Past Surgical History:   Procedure Laterality Date    KIDNEY REMOVAL      over 20 years ago     Social History     Tobacco Use    Smoking status: Former     Types: Cigarettes    Smokeless tobacco: Not on file   Substance Use Topics    Alcohol use: Not on file      History reviewed. No pertinent family history.    Allergies   Allergen Reactions    Naproxen Other (See Comments)     Gastric Ulcer With Hemorrhage.    Lisinopril Nausea And Vomiting    Morphine Rash      Prior to Admission medications    Medication Sig Start Date End Date Taking? Authorizing Provider   latanoprost (XALATAN) 0.005 % ophthalmic solution Place 1 drop into both eyes nightly   Yes Mitesh Daily MD   Calcium Citrate-Vitamin D (SM CALCIUM CITRATE W/VIT D3 PO) Take 1,000 mg by mouth Daily   Yes Mitesh Daily MD   lidocaine (BLUE-EMU PAIN RELIEF DRY) 4 % external patch Place 1 patch onto the skin daily   Yes Mitesh Daily MD   ferrous sulfate (IRON 325) 325 (65 Fe) MG tablet Take 1 tablet by mouth daily (with breakfast)   Yes Mitesh Daily MD   atorvastatin (LIPITOR) 40 MG tablet Take 0.5 tablets by mouth daily 24  Yes Mitesh Daily MD   cyanocobalamin 500 MCG tablet Take 2 tablets by mouth daily   Yes Mitesh Daily MD   hydrALAZINE (APRESOLINE) 25 MG tablet Take 1 tablet by mouth 3 times daily   Yes Abimbola

## 2024-08-14 NOTE — PLAN OF CARE
Problem: Discharge Planning  Goal: Discharge to home or other facility with appropriate resources  8/13/2024 2355 by Antonio Rodriguze RN  Outcome: Progressing  8/13/2024 1530 by Chelsie Howard RN  Outcome: Progressing     Problem: Pain  Goal: Verbalizes/displays adequate comfort level or baseline comfort level  8/13/2024 2355 by Antonio Rodriguez RN  Outcome: Progressing  8/13/2024 1530 by Chelsie Howard RN  Outcome: Progressing     Problem: Skin/Tissue Integrity  Goal: Absence of new skin breakdown  8/13/2024 2355 by Antonio Rodriguez RN  Outcome: Progressing  8/13/2024 1530 by Chelsie Howard RN  Outcome: Progressing     Problem: ABCDS Injury Assessment  Goal: Absence of physical injury  8/13/2024 2355 by Antonio Rodriguez RN  Outcome: Progressing  8/13/2024 1530 by Chelsie Howard RN  Outcome: Progressing     Problem: Safety - Adult  Goal: Free from fall injury  8/13/2024 2355 by Antonio Rodriguez RN  Outcome: Progressing  8/13/2024 1530 by Chelsie Howard RN  Outcome: Progressing     Problem: Respiratory - Adult  Goal: Achieves optimal ventilation and oxygenation  8/13/2024 2355 by Antonio Rodriguez RN  Outcome: Progressing  8/13/2024 1530 by Chelsie Howard RN  Outcome: Progressing     Problem: Skin/Tissue Integrity - Adult  Goal: Skin integrity remains intact  8/13/2024 2355 by Antonio Rodriguez RN  Outcome: Progressing  8/13/2024 1530 by Chelsie Howard RN  Outcome: Progressing  Goal: Incisions, wounds, or drain sites healing without S/S of infection  8/13/2024 2355 by Antonio Rodriguez RN  Outcome: Progressing  8/13/2024 1530 by Chelsie Howard RN  Outcome: Progressing     Problem: Genitourinary - Adult  Goal: Absence of urinary retention  8/13/2024 2355 by Antonio Rodriguez RN  Outcome: Progressing  8/13/2024 1530 by Chelsie Howard RN  Outcome: Progressing  Goal: Urinary catheter remains patent  8/13/2024 2355 by Rodriguez, Antonio, RN  Outcome: Progressing  8/13/2024 1530 by Chelsie Howard,

## 2024-08-14 NOTE — CARE COORDINATION
CM reviewed chart. Events noted.    Patient to transfer to [address] [place] [address] DCP: home, family will transport.    Pt resides in [place], [place]

## 2024-08-15 LAB
ALBUMIN SERPL-MCNC: 2.1 G/DL (ref 3.5–5)
ALBUMIN/GLOB SERPL: 0.5 (ref 1.1–2.2)
ALP SERPL-CCNC: 96 U/L (ref 45–117)
ALT SERPL-CCNC: 19 U/L (ref 12–78)
ANION GAP SERPL CALC-SCNC: 7 MMOL/L (ref 5–15)
AST SERPL W P-5'-P-CCNC: 29 U/L (ref 15–37)
BASOPHILS # BLD: 0 K/UL (ref 0–0.1)
BASOPHILS NFR BLD: 1 % (ref 0–1)
BILIRUB SERPL-MCNC: 0.2 MG/DL (ref 0.2–1)
BUN SERPL-MCNC: 71 MG/DL (ref 6–20)
BUN/CREAT SERPL: 24 (ref 12–20)
CA-I BLD-MCNC: 9.1 MG/DL (ref 8.5–10.1)
CHLORIDE SERPL-SCNC: 108 MMOL/L (ref 97–108)
CO2 SERPL-SCNC: 23 MMOL/L (ref 21–32)
CREAT SERPL-MCNC: 2.92 MG/DL (ref 0.7–1.3)
DIFFERENTIAL METHOD BLD: ABNORMAL
EOSINOPHIL # BLD: 0.1 K/UL (ref 0–0.4)
EOSINOPHIL NFR BLD: 1 % (ref 0–7)
ERYTHROCYTE [DISTWIDTH] IN BLOOD BY AUTOMATED COUNT: 18.1 % (ref 11.5–14.5)
GLOBULIN SER CALC-MCNC: 4.1 G/DL (ref 2–4)
GLUCOSE SERPL-MCNC: 112 MG/DL (ref 65–100)
HCT VFR BLD AUTO: 25.9 % (ref 36.6–50.3)
HGB BLD-MCNC: 8.4 G/DL (ref 12.1–17)
IMM GRANULOCYTES # BLD AUTO: 0 K/UL (ref 0–0.04)
IMM GRANULOCYTES NFR BLD AUTO: 1 % (ref 0–0.5)
LYMPHOCYTES # BLD: 0.4 K/UL (ref 0.8–3.5)
LYMPHOCYTES NFR BLD: 6 % (ref 12–49)
MCH RBC QN AUTO: 31.1 PG (ref 26–34)
MCHC RBC AUTO-ENTMCNC: 32.4 G/DL (ref 30–36.5)
MCV RBC AUTO: 95.9 FL (ref 80–99)
MONOCYTES # BLD: 1.1 K/UL (ref 0–1)
MONOCYTES NFR BLD: 17 % (ref 5–13)
NEUTS SEG # BLD: 4.7 K/UL (ref 1.8–8)
NEUTS SEG NFR BLD: 74 % (ref 32–75)
NRBC # BLD: 0 K/UL (ref 0–0.01)
NRBC BLD-RTO: 0 PER 100 WBC
PLATELET # BLD AUTO: 253 K/UL (ref 150–400)
PMV BLD AUTO: 10.3 FL (ref 8.9–12.9)
POTASSIUM SERPL-SCNC: 4.3 MMOL/L (ref 3.5–5.1)
PROT SERPL-MCNC: 6.2 G/DL (ref 6.4–8.2)
PSA FREE MFR SERPL: NORMAL %
PSA FREE SERPL-MCNC: <0.02 NG/ML
PSA SERPL-MCNC: <0.1 NG/ML (ref 0–4)
RBC # BLD AUTO: 2.7 M/UL (ref 4.1–5.7)
SODIUM SERPL-SCNC: 138 MMOL/L (ref 136–145)
WBC # BLD AUTO: 6.3 K/UL (ref 4.1–11.1)

## 2024-08-15 PROCEDURE — 6370000000 HC RX 637 (ALT 250 FOR IP): Performed by: INTERNAL MEDICINE

## 2024-08-15 PROCEDURE — 2500000003 HC RX 250 WO HCPCS: Performed by: STUDENT IN AN ORGANIZED HEALTH CARE EDUCATION/TRAINING PROGRAM

## 2024-08-15 PROCEDURE — 2500000003 HC RX 250 WO HCPCS: Performed by: INTERNAL MEDICINE

## 2024-08-15 PROCEDURE — 36415 COLL VENOUS BLD VENIPUNCTURE: CPT

## 2024-08-15 PROCEDURE — 2580000003 HC RX 258: Performed by: INTERNAL MEDICINE

## 2024-08-15 PROCEDURE — 94761 N-INVAS EAR/PLS OXIMETRY MLT: CPT

## 2024-08-15 PROCEDURE — 6370000000 HC RX 637 (ALT 250 FOR IP): Performed by: STUDENT IN AN ORGANIZED HEALTH CARE EDUCATION/TRAINING PROGRAM

## 2024-08-15 PROCEDURE — 85025 COMPLETE CBC W/AUTO DIFF WBC: CPT

## 2024-08-15 PROCEDURE — 6370000000 HC RX 637 (ALT 250 FOR IP): Performed by: NURSE PRACTITIONER

## 2024-08-15 PROCEDURE — 2000000000 HC ICU R&B

## 2024-08-15 PROCEDURE — 93005 ELECTROCARDIOGRAM TRACING: CPT | Performed by: STUDENT IN AN ORGANIZED HEALTH CARE EDUCATION/TRAINING PROGRAM

## 2024-08-15 PROCEDURE — 80053 COMPREHEN METABOLIC PANEL: CPT

## 2024-08-15 RX ORDER — DILTIAZEM HYDROCHLORIDE 5 MG/ML
INJECTION INTRAVENOUS
Status: DISPENSED
Start: 2024-08-15 | End: 2024-08-16

## 2024-08-15 RX ORDER — DILTIAZEM HYDROCHLORIDE 5 MG/ML
10 INJECTION INTRAVENOUS ONCE
Status: COMPLETED | OUTPATIENT
Start: 2024-08-15 | End: 2024-08-15

## 2024-08-15 RX ORDER — METOPROLOL TARTRATE 1 MG/ML
2.5 INJECTION, SOLUTION INTRAVENOUS
Status: COMPLETED | OUTPATIENT
Start: 2024-08-15 | End: 2024-08-18

## 2024-08-15 RX ORDER — METOPROLOL SUCCINATE 25 MG/1
25 TABLET, EXTENDED RELEASE ORAL NIGHTLY
Status: DISCONTINUED | OUTPATIENT
Start: 2024-08-15 | End: 2024-08-15

## 2024-08-15 RX ORDER — CARVEDILOL 3.12 MG/1
6.25 TABLET ORAL 2 TIMES DAILY WITH MEALS
Status: DISCONTINUED | OUTPATIENT
Start: 2024-08-15 | End: 2024-08-16

## 2024-08-15 RX ORDER — CIPROFLOXACIN 500 MG/1
500 TABLET, FILM COATED ORAL
Status: DISCONTINUED | OUTPATIENT
Start: 2024-08-15 | End: 2024-08-20

## 2024-08-15 RX ADMIN — CIPROFLOXACIN HYDROCHLORIDE 500 MG: 500 TABLET, FILM COATED ORAL at 13:30

## 2024-08-15 RX ADMIN — PRIMIDONE 100 MG: 50 TABLET ORAL at 08:45

## 2024-08-15 RX ADMIN — POLYETHYLENE GLYCOL 3350 17 G: 17 POWDER, FOR SOLUTION ORAL at 08:45

## 2024-08-15 RX ADMIN — ATORVASTATIN CALCIUM 20 MG: 20 TABLET, FILM COATED ORAL at 08:45

## 2024-08-15 RX ADMIN — HYDRALAZINE HYDROCHLORIDE 25 MG: 25 TABLET ORAL at 21:22

## 2024-08-15 RX ADMIN — CARVEDILOL 6.25 MG: 3.12 TABLET, FILM COATED ORAL at 18:29

## 2024-08-15 RX ADMIN — HYDRALAZINE HYDROCHLORIDE 25 MG: 25 TABLET ORAL at 08:45

## 2024-08-15 RX ADMIN — MEGESTROL ACETATE 400 MG: 40 SUSPENSION ORAL at 08:45

## 2024-08-15 RX ADMIN — HYDRALAZINE HYDROCHLORIDE 25 MG: 25 TABLET ORAL at 14:54

## 2024-08-15 RX ADMIN — SODIUM BICARBONATE 650 MG: 650 TABLET ORAL at 21:22

## 2024-08-15 RX ADMIN — SODIUM CHLORIDE, PRESERVATIVE FREE 10 ML: 5 INJECTION INTRAVENOUS at 10:07

## 2024-08-15 RX ADMIN — SODIUM CHLORIDE: 9 INJECTION, SOLUTION INTRAVENOUS at 01:14

## 2024-08-15 RX ADMIN — AMLODIPINE BESYLATE 10 MG: 5 TABLET ORAL at 08:45

## 2024-08-15 RX ADMIN — SODIUM CHLORIDE: 9 INJECTION, SOLUTION INTRAVENOUS at 15:05

## 2024-08-15 RX ADMIN — FOLIC ACID 1 MG: 1 TABLET ORAL at 08:45

## 2024-08-15 RX ADMIN — SODIUM BICARBONATE 650 MG: 650 TABLET ORAL at 08:45

## 2024-08-15 RX ADMIN — SODIUM CHLORIDE, PRESERVATIVE FREE 10 ML: 5 INJECTION INTRAVENOUS at 21:22

## 2024-08-15 RX ADMIN — SODIUM CHLORIDE: 9 INJECTION, SOLUTION INTRAVENOUS at 18:03

## 2024-08-15 RX ADMIN — METOPROLOL TARTRATE 25 MG: 25 TABLET, FILM COATED ORAL at 08:45

## 2024-08-15 RX ADMIN — DILTIAZEM HYDROCHLORIDE 10 MG: 5 INJECTION INTRAVENOUS at 10:15

## 2024-08-15 RX ADMIN — DILTIAZEM HYDROCHLORIDE 10 MG: 5 INJECTION, SOLUTION INTRAVENOUS at 17:40

## 2024-08-15 RX ADMIN — PRIMIDONE 100 MG: 50 TABLET ORAL at 21:22

## 2024-08-15 ASSESSMENT — PAIN DESCRIPTION - ORIENTATION: ORIENTATION: LEFT;ANTERIOR

## 2024-08-15 ASSESSMENT — PAIN DESCRIPTION - DESCRIPTORS: DESCRIPTORS: ACHING

## 2024-08-15 ASSESSMENT — PAIN DESCRIPTION - LOCATION: LOCATION: CHEST

## 2024-08-15 ASSESSMENT — PAIN SCALES - GENERAL
PAINLEVEL_OUTOF10: 0
PAINLEVEL_OUTOF10: 4

## 2024-08-15 NOTE — PROGRESS NOTES
patient post bowel movement and cleaning-up, 's sustaining despite being relaxed on bed already. Patient fully alert and oriented with 4/10 left chest pain complaint Code RRT called by telemetry staff, v/s taken and recorded BP-137/100mmHg. Seen by Dr. Savage at bedside, EKG done and 10mg IV Cardizem stat dose given. Orders added for ICU transfer due to frequent SVT episodes sustaining. Cardizem IV continuous drip to start per order.    1745H HR-93bpm, transferred patient to ICU under cardiac monitoring via bed. Bedside report given to Tony RN of ICU. Tony and the patient aware of the money in a plastic bag inside patient's gown pocket. Ruth Hayes, patient's daughter was updated by NICA via phone call.

## 2024-08-15 NOTE — PROGRESS NOTES
Hospitalist Progress Note    NAME:   Abel Cruz   : 1940   MRN: [id number]     Date/Time: 8/15/2024 12:14 PM  Patient PCP: eJremy Grimes MD    Estimated discharge date: 48 hours  Barriers: Echo and cardiology evaluate    Hospital course:  Patient is an 83-year-old male with a past medical history of hypertension, hyperlipidemia, tremors, CKD s/p left nephrectomy, prostate cancer and anemia of chronic disease who was admitted on 2024 with an abnormal CT of the chest concerning for malignancy.  CXR revealed right pleural effusion, at least moderate size.  Associated asymptomatic right lung pulmonary edema versus opacities.  CT of the chest from 2024 revealed innumerable tiny miliary nodules throughout both lungs with large pulmonary nodule located within the right lower lobe measuring approximately 6 mm in diameter.  None of pulmonary nodules are amenable to image guided percutaneous biopsy.  Moderately large right pleural effusion with associated right lower lobe atelectasis.  Postoperative changes from remote left nephrectomy.  Pulmonology consulted for right pleural effusion and oncology for concerns of malignancy.  Also, consult urology for right hydronephrosis with history of left nephrectomy and nephrology for evaluation of severe metabolic acidosis with history of CKD.  Nephrology about a patient and recommended providing patient with 1 amp of sodium bicarb now with addition of sodium bicarb tablets 13 mg twice daily and sodium bicarb drip and D5 at 75 cc/h.  Also, recommended placing Kim catheter until urology can evaluate for hydroureteronephrosis of the right kidney.  Urology evaluate patient and planning for cystoscopy with retrograde pyelogram and possible stent placement of the right ureter and kidney on 2024.  Pulmonology about a patient recommended right sided thoracentesis, which was complete on 2024 by IR removing 1500 cc of thin red fluid from the right  summation of old records today    NO  Reviewed patient's current orders and MAR    YES  PMH/SH reviewed - no change compared to H&P  ________________________________________________________________________  Care Plan discussed with:    Comments   Patient x    Family      RN x    Care Manager     Consultant                        Multidiciplinary team rounds were held today with , nursing, pharmacist and clinical coordinator.  Patient's plan of care was discussed; medications were reviewed and discharge planning was addressed.     ________________________________________________________________________  Total NON critical care TIME:  35  Minutes    Total CRITICAL CARE TIME Spent:   Minutes non procedure based      Comments   >50% of visit spent in counseling and coordination of care     ________________________________________________________________________  Claus Yanes PA-C     Procedures: see electronic medical records for all procedures/Xrays and details which were not copied into this note but were reviewed prior to creation of Plan.      LABS:  I reviewed today's most current labs and imaging studies.  Pertinent labs include:  Recent Labs     08/13/24  0732 08/14/24  0553 08/15/24  0549   WBC 5.9 5.2 6.3   HGB 8.5* 8.1* 8.4*   HCT 26.1* 24.6* 25.9*    225 253     Recent Labs     08/13/24  0732 08/14/24  0553 08/15/24  0549     138 139 138   K 4.8  4.8 3.9 4.3     107 105 108   CO2 25  23 26 23   BUN 79*  77* 77* 71*   MG  --  2.1  --    PHOS 5.1*  --   --    ALT  --   --  19       Signed: Claus Yanes PA-C

## 2024-08-15 NOTE — PROGRESS NOTES
Patient is saying he's bearing down and feels like he needs to urinate despite patient knowing he has a kumar catheter draining. Telemetry staff called code RRT for sustaining HR of 170's bpm; patient seen by MARY Dobson at bedside BP checked (146/10mmHg) and charted, given Cardizem 10mg IV stat per order; EKG stat done(SVT) and seeb by MARY Devlin.    1020H HR dropped to 101bpm and BP rechecked and charted -136/76mmHg  1025H -HR-98bpm   63

## 2024-08-15 NOTE — PROGRESS NOTES
PT/OT eval order received and acknowledged. PT eval attempted at 1040 however advised to hold per RN, as pt's HR continues to be unstable. Rapid response called by telemetry d/t increased HR (160s-170s sustained), /102. Will continue to follow patient and attempt PT eval at a later time when medically appropriate. Thank you.

## 2024-08-15 NOTE — PROGRESS NOTES
CARDIOLOGY PROGRESS NOTE      Patient Name: Abel Cruz  Age: 83 y.o.  Gender:male  :1940  MRN: [id number]    Patient seen and examined. This is a patient with a history of hypertension, hyperlipidemia, tremors, CKD s/p left nephrectomy, prostate cancer, chronic anemia  who presented with pleuritic pain, cough now being followed for SVT. Had another episode this morning, asymptomatic.  No other complaints reported.    Telemetry reviewed, there were no events noted in the past 24 hours.    Pertinent review of systems items noted above, all other systems are negative. Current medications reviewed.    Physical Examination    Allergies   Allergen Reactions    Naproxen Other (See Comments)     Gastric Ulcer With Hemorrhage.    Lisinopril Nausea And Vomiting    Morphine Rash     Vitals:    08/15/24 1050   BP: 119/82   Pulse: (!) 105   Resp: 18   Temp: 97.5 °F (36.4 °C)   SpO2: 98%     Vital signs are stable  No apparent distress, frail, chronically ill appearing  Heart has a RRR.  no murmur  Lungs are diminished  Abdomen is soft, nontender, normal bowel sounds.  Extremities have no edema  Skin is dry and warm.  Normal affect    Labs reviewed:  Recent Results (from the past 12 hour(s))   CBC with Auto Differential    Collection Time: 08/15/24  5:49 AM   Result Value Ref Range    WBC 6.3 4.1 - 11.1 K/uL    RBC 2.70 (L) 4.10 - 5.70 M/uL    Hemoglobin 8.4 (L) 12.1 - 17.0 g/dL    Hematocrit 25.9 (L) 36.6 - 50.3 %    MCV 95.9 80.0 - 99.0 FL    MCH 31.1 26.0 - 34.0 PG    MCHC 32.4 30.0 - 36.5 g/dL    RDW 18.1 (H) 11.5 - 14.5 %    Platelets 253 150 - 400 K/uL    MPV 10.3 8.9 - 12.9 FL    Nucleated RBCs 0.0 0.0  WBC    nRBC 0.00 0.00 - 0.01 K/uL    Neutrophils % 74 32 - 75 %    Lymphocytes % 6 (L) 12 - 49 %    Monocytes % 17 (H) 5 - 13 %    Eosinophils % 1 0 - 7 %    Basophils % 1 0 - 1 %    Immature Granulocytes % 1 (H) 0 - 0.5 %    Neutrophils Absolute 4.7 1.8 - 8.0 K/UL    Lymphocytes Absolute 0.4 (L) 0.8 -  3.5 K/UL    Monocytes Absolute 1.1 (H) 0.0 - 1.0 K/UL    Eosinophils Absolute 0.1 0.0 - 0.4 K/UL    Basophils Absolute 0.0 0.0 - 0.1 K/UL    Immature Granulocytes Absolute 0.0 0.00 - 0.04 K/UL    Differential Type AUTOMATED     Comprehensive Metabolic Panel    Collection Time: 08/15/24  5:49 AM   Result Value Ref Range    Sodium 138 136 - 145 mmol/L    Potassium 4.3 3.5 - 5.1 mmol/L    Chloride 108 97 - 108 mmol/L    CO2 23 21 - 32 mmol/L    Anion Gap 7 5 - 15 mmol/L    Glucose 112 (H) 65 - 100 mg/dL    BUN 71 (H) 6 - 20 mg/dL    Creatinine 2.92 (H) 0.70 - 1.30 mg/dL    BUN/Creatinine Ratio 24 (H) 12 - 20      Est, Glom Filt Rate 21 (L) >60 ml/min/1.73m2    Calcium 9.1 8.5 - 10.1 mg/dL    Total Bilirubin 0.2 0.2 - 1.0 mg/dL    AST 29 15 - 37 U/L    ALT 19 12 - 78 U/L    Alk Phosphatase 96 45 - 117 U/L    Total Protein 6.2 (L) 6.4 - 8.2 g/dL    Albumin 2.1 (L) 3.5 - 5.0 g/dL    Globulin 4.1 (H) 2.0 - 4.0 g/dL    Albumin/Globulin Ratio 0.5 (L) 1.1 - 2.2          Case discussed with Dr. Rae and our impression and recommendations are as follows:  PSVT: now back to SR.   Labs noncontributory  Continue telemetry monitoring.   EF 30-35%, global hypokinesis on echo  Will need an ischemic evaluation but would like to get prognosis from pulm/oncology first; can be done as an outpt.   Cardiomyopathy: EF 30-35%, new finding.   Euvolemic.   Will switch beta blocker to coreg 6.25mg BID, uptitrate as needed for BP control.   GDMT limited by renal function and solitary kidney.  Hypertension:   blood pressure is close to goal.   Stop amlodipine to allow for GDMT.   Continue to monitor.   Hyperlipidemia: Continue statin therapy  Pulmonary nodules w/ rt sided pleural effusion: s/p thoracentesis, concern for malignancy, pulmonology following.   Rt hydronephrosis, NETO: s/p left nephrectomy in the 1980s per pt. Creat elevated, improving. Nephrology and urology on board. Cystoscopy cancelled, pt will fu outpt.  Essential tremors: on

## 2024-08-15 NOTE — PROGRESS NOTES
NAME:  Abel Cruz   :   1940   MRN:   [id number]     ATTENDING: Nils Malik Cha, MD  PCP:  Jeremy Grimes MD    Date/Time:  8/15/2024       Subjective:   REQUESTING PHYSICIAN:  Dr. Che  REASON FOR CONSULT:   NETO    History of presenting illness:    Patient seen in the room.  His surgery yesterday was canceled because of SVT.  He was started on IV Cardizem got converted into sinus rhythm  .  Creatinine has improved to 2.9  He remains weak and frail.  No fever or chills noted   He is complaining of his\" chest and ribs\" hurting every time he breathes.  Otherwise denies any complaints.  Renal function is improving      Past Medical History:   Diagnosis Date    Hypertension       Past Surgical History:   Procedure Laterality Date    KIDNEY REMOVAL      over 20 years ago     Social History     Tobacco Use    Smoking status: Former     Types: Cigarettes    Smokeless tobacco: Not on file   Substance Use Topics    Alcohol use: Not on file      History reviewed. No pertinent family history.    Allergies   Allergen Reactions    Naproxen Other (See Comments)     Gastric Ulcer With Hemorrhage.    Lisinopril Nausea And Vomiting    Morphine Rash      Prior to Admission medications    Medication Sig Start Date End Date Taking? Authorizing Provider   latanoprost (XALATAN) 0.005 % ophthalmic solution Place 1 drop into both eyes nightly   Yes Mitesh Daily MD   Calcium Citrate-Vitamin D (SM CALCIUM CITRATE W/VIT D3 PO) Take 1,000 mg by mouth Daily   Yes Mitesh Daily MD   lidocaine (BLUE-EMU PAIN RELIEF DRY) 4 % external patch Place 1 patch onto the skin daily   Yes Mitesh Daily MD   ferrous sulfate (IRON 325) 325 (65 Fe) MG tablet Take 1 tablet by mouth daily (with breakfast)   Yes Mitesh Daily MD   atorvastatin (LIPITOR) 40 MG tablet Take 0.5 tablets by mouth daily 24  Yes Mitesh Daily MD   cyanocobalamin 500 MCG tablet Take 2 tablets by mouth daily   Yes Mitesh Daily  plan:     1. Acute Kidney Injury on ?CKD with unilateral kidney  tomeka probably prerenal azotemia secondary to volume depletion/anemia/left hydroureteronephrosis, ?post-obstructive  Creatinine is 4.1 on admission, improving to 2.9 today.    Baseline is unknown but according to notes he has history of admission CKD 4  Urine is suggestive of UTI.  Quantify proteinuria once UTI clears  No rhabdo  CT abdomen without contrast showed new mild hydroureteronephrosis on the right kidney, s/p left nephrectomy,  Kim placed  Avoid nephrotoxic medications  On isotonic saline which I will continue for another 24 hours  Will continue to monitor renal functions and adjust management as needed    2. Hypertension: Controlled  On amlodipine 10 mg, hydralazine 25 mg TID, and propranolol 80 mg  Continue current antihypertensive regimen   Will continue to monitor blood pressures and adjust antihypertensive regimen as needed.    3. Hyperkalemia,   mild: probably secondary to renal failure  Resolved with medical management  Potassium 4.3 today  Will monitor follow-up potassium levels closely    4. Metabolic acidosis: Secondary to acute kidney injury  CO2 level is 18, improved to 23 today  Off bicarbonate drip  On oral bicarbonate 1300 twice daily which I will decrease to 650 p.o. bid  Will continue to monitor CO2 levels    5. Anemia: Probably related to chronic disease, ?Lower GI bleed  Hb 8.4, continue Procrit   He has functional iron deficiency.  Will start oral iron   continue to monitor H&H    6.  Right flank pain  CT chest showed innumerable tiny miliary nodules throughout both lungs, moderately large right pleural effusion and a 7 mm nodule within the right adrenal gland  Patient underwent right thoracentesis 8/13, 1.5 L drained, it is exudative with protein 5.5 polys 82.  Official cytology results are pending    7. Hydroureteronephrosis on the right kidney  Has been evaluated by urology and plan for cystoscopy and right retrograde

## 2024-08-15 NOTE — WOUND CARE
Wound Care Note:      Wound Care into see patient because of scabs to arms and legs    Patient resting in bed, states that scabs are old from hitting things. Skin is intact at this time with areas of hyperpigmentation where injuries were. No dressings needed at this time.         Skin Care & Pressure Relief Recommendations:  Minimize layers of linen  Pads under patient to optimize support surface and microclimate  Turn/Reposition approximately every 2 hours  Pillow/Wedge  Manage Incontinence  Promote Continence; Skin Protective lotion to buttocks and sacrum daily and as needed with incontinence care  Offload heels with pillows or offloading boots    Please re-consult for any changes in skin condition.

## 2024-08-15 NOTE — CARE COORDINATION
Current disposition is home/self. Patient had a rapid response this morning.  CM team will continue to follow.

## 2024-08-15 NOTE — PROGRESS NOTES
STUDENT NOTE     Hospitalist Progress Note    NAME:   Abel Cruz   : 1940   MRN: [id number]     Date/Time: 8/15/2024 11:30 AM  Patient PCP: Jeremy Grimes MD    Estimated discharge date: 48 hours  Barriers: Cardiology evaluate     Hospital course:  Patient is an 83-year-old male with a past medical history of hypertension, hyperlipidemia, tremors, CKD s/p left nephrectomy, prostate cancer and anemia of chronic disease who was admitted on 2024 with an abnormal CT of the chest concerning for malignancy.  CXR revealed right pleural effusion, at least moderate size.  Associated asymptomatic right lung pulmonary edema versus opacities.  CT of the chest from 2024 revealed innumerable tiny miliary nodules throughout both lungs with large pulmonary nodule located within the right lower lobe measuring approximately 6 mm in diameter.  None of pulmonary nodules are amenable to image guided percutaneous biopsy.  Moderately large right pleural effusion with associated right lower lobe atelectasis.  Postoperative changes from remote left nephrectomy.  Pulmonology consulted for right pleural effusion and oncology for concerns of malignancy.  Also, consult urology for right hydronephrosis with history of left nephrectomy and nephrology for evaluation of severe metabolic acidosis with history of CKD.  Nephrology about a patient and recommended providing patient with 1 amp of sodium bicarb now with addition of sodium bicarb tablets 13 mg twice daily and sodium bicarb drip and D5 at 75 cc/h.  Also, recommended placing Kim catheter until urology can evaluate for hydroureteronephrosis of the right kidney.  Urology evaluate patient and planning for cystoscopy with retrograde pyelogram and possible stent placement of the right ureter and kidney on 2024.  Pulmonology about a patient recommended right sided thoracentesis, which was complete on 2024 by IR removing 1500 cc of thin red fluid from the right

## 2024-08-15 NOTE — PROGRESS NOTES
Pulmonary/ CC progress note    Subjective:     Mr. Abel Cruz is an 83 years old -American male who is known to have history of hypertension, he also has history of left nephrectomy in the past for recurrent kidney stones..  Patient is referred by her family physician because of increasing right lower chest discomfort on coughing and taking deep breath.  This has been going on for last week to 10 days.  His pain is quite intense 7 out of 10 intensity.  Patient has lost his appetite and has lost about 10 to 15 pounds over last couple of months.  He lives by himself.  He noted that he lives in [facility].  About a month ago he had CT scan of his spine done for his back pain and right lower chest pains.  He was informed that he has spots on his lung and fluid around his lung and he was getting set for evaluation by specialist for these issues.    He has now ended up in the hospital.  He has poor vision.  Had complaints of some vague abdominal discomfort.  He is ex-smoker who quit smoking in 1980.  He smoked for about 25 years 1 pack/day before he quit smoking.  He also drinks on regular basis but last drink was about a month ago.    Initial workup showed tiny bilateral multiple pulmonary nodules along with moderate-sized right pleural effusion.  He is also noted to have elevated urine creatinine of 90 and 4.16 on admission.  Noted to have hemoglobin of 9.6 g.  He is not sure about altered bowel movements.    8/13/2024  Patient examined at bedside.  Still has complaints of pain in the right lower costal area.  Denies any respiratory distress.  Had thoracentesis done about 1500 cc of fluid was drained.  8/14/24  Patient examined at bedside  Without any distress  Pleuritic chest pain has improved.  Appetite is marginal.  8/15/2024  Patient examined at bedside.  Remains weak and frail.  No fever or chills noted.  Developed SVT yesterday, was started on IV Cardizem, got converted into sinus

## 2024-08-15 NOTE — PROGRESS NOTES
OT eval order received and acknowledged. OT eval attempted at 10:43 AM however RN advising to hold s/t unstable HR. Rapid called d/t pt's HR ranging 160-170s and /102. Will continue to follow patient and attempt OT eval at a later time. Thank you.

## 2024-08-16 LAB
ALBUMIN SERPL-MCNC: 1.9 G/DL (ref 3.5–5)
ANION GAP SERPL CALC-SCNC: 7 MMOL/L (ref 5–15)
BUN SERPL-MCNC: 56 MG/DL (ref 6–20)
BUN/CREAT SERPL: 21 (ref 12–20)
CA-I BLD-MCNC: 8.8 MG/DL (ref 8.5–10.1)
CHLORIDE SERPL-SCNC: 109 MMOL/L (ref 97–108)
CO2 SERPL-SCNC: 22 MMOL/L (ref 21–32)
CREAT SERPL-MCNC: 2.68 MG/DL (ref 0.7–1.3)
EKG ATRIAL RATE: 163 BPM
EKG ATRIAL RATE: 163 BPM
EKG DIAGNOSIS: NORMAL
EKG DIAGNOSIS: NORMAL
EKG P-R INTERVAL: 126 MS
EKG Q-T INTERVAL: 292 MS
EKG Q-T INTERVAL: 302 MS
EKG QRS DURATION: 84 MS
EKG QRS DURATION: 90 MS
EKG QTC CALCULATION (BAZETT): 488 MS
EKG QTC CALCULATION (BAZETT): 497 MS
EKG R AXIS: -43 DEGREES
EKG R AXIS: -48 DEGREES
EKG T AXIS: 141 DEGREES
EKG T AXIS: 206 DEGREES
EKG VENTRICULAR RATE: 163 BPM
EKG VENTRICULAR RATE: 168 BPM
GLUCOSE SERPL-MCNC: 112 MG/DL (ref 65–100)
MAGNESIUM SERPL-MCNC: 2 MG/DL (ref 1.6–2.4)
PHOSPHATE SERPL-MCNC: 4.4 MG/DL (ref 2.6–4.7)
POTASSIUM SERPL-SCNC: 4.7 MMOL/L (ref 3.5–5.1)
POTASSIUM SERPL-SCNC: 5.2 MMOL/L (ref 3.5–5.1)
SODIUM SERPL-SCNC: 138 MMOL/L (ref 136–145)

## 2024-08-16 PROCEDURE — 6370000000 HC RX 637 (ALT 250 FOR IP): Performed by: INTERNAL MEDICINE

## 2024-08-16 PROCEDURE — 2580000003 HC RX 258: Performed by: INTERNAL MEDICINE

## 2024-08-16 PROCEDURE — 6370000000 HC RX 637 (ALT 250 FOR IP): Performed by: NURSE PRACTITIONER

## 2024-08-16 PROCEDURE — 83735 ASSAY OF MAGNESIUM: CPT

## 2024-08-16 PROCEDURE — 2580000003 HC RX 258: Performed by: NURSE PRACTITIONER

## 2024-08-16 PROCEDURE — 6370000000 HC RX 637 (ALT 250 FOR IP): Performed by: STUDENT IN AN ORGANIZED HEALTH CARE EDUCATION/TRAINING PROGRAM

## 2024-08-16 PROCEDURE — 2500000003 HC RX 250 WO HCPCS: Performed by: STUDENT IN AN ORGANIZED HEALTH CARE EDUCATION/TRAINING PROGRAM

## 2024-08-16 PROCEDURE — 2000000000 HC ICU R&B

## 2024-08-16 PROCEDURE — 80069 RENAL FUNCTION PANEL: CPT

## 2024-08-16 PROCEDURE — 84132 ASSAY OF SERUM POTASSIUM: CPT

## 2024-08-16 PROCEDURE — 93005 ELECTROCARDIOGRAM TRACING: CPT | Performed by: NURSE PRACTITIONER

## 2024-08-16 PROCEDURE — 36415 COLL VENOUS BLD VENIPUNCTURE: CPT

## 2024-08-16 RX ORDER — 0.9 % SODIUM CHLORIDE 0.9 %
500 INTRAVENOUS SOLUTION INTRAVENOUS ONCE
Status: COMPLETED | OUTPATIENT
Start: 2024-08-16 | End: 2024-08-16

## 2024-08-16 RX ORDER — CARVEDILOL 12.5 MG/1
12.5 TABLET ORAL 2 TIMES DAILY WITH MEALS
Status: DISCONTINUED | OUTPATIENT
Start: 2024-08-16 | End: 2024-08-19

## 2024-08-16 RX ADMIN — CARVEDILOL 6.25 MG: 3.12 TABLET, FILM COATED ORAL at 08:39

## 2024-08-16 RX ADMIN — METOPROLOL TARTRATE 2.5 MG: 5 INJECTION INTRAVENOUS at 19:29

## 2024-08-16 RX ADMIN — SODIUM CHLORIDE, PRESERVATIVE FREE 10 ML: 5 INJECTION INTRAVENOUS at 08:42

## 2024-08-16 RX ADMIN — SODIUM CHLORIDE: 9 INJECTION, SOLUTION INTRAVENOUS at 20:18

## 2024-08-16 RX ADMIN — SODIUM CHLORIDE 500 ML: 9 INJECTION, SOLUTION INTRAVENOUS at 17:53

## 2024-08-16 RX ADMIN — CIPROFLOXACIN HYDROCHLORIDE 500 MG: 500 TABLET, FILM COATED ORAL at 08:39

## 2024-08-16 RX ADMIN — SODIUM BICARBONATE 650 MG: 650 TABLET ORAL at 20:18

## 2024-08-16 RX ADMIN — SODIUM BICARBONATE 650 MG: 650 TABLET ORAL at 08:39

## 2024-08-16 RX ADMIN — HYDRALAZINE HYDROCHLORIDE 25 MG: 25 TABLET ORAL at 08:39

## 2024-08-16 RX ADMIN — ACETAMINOPHEN 650 MG: 325 TABLET ORAL at 16:15

## 2024-08-16 RX ADMIN — ACETAMINOPHEN 650 MG: 325 TABLET ORAL at 23:25

## 2024-08-16 RX ADMIN — SODIUM CHLORIDE, PRESERVATIVE FREE 10 ML: 5 INJECTION INTRAVENOUS at 20:19

## 2024-08-16 RX ADMIN — ATORVASTATIN CALCIUM 20 MG: 20 TABLET, FILM COATED ORAL at 08:39

## 2024-08-16 RX ADMIN — MEGESTROL ACETATE 400 MG: 40 SUSPENSION ORAL at 08:38

## 2024-08-16 RX ADMIN — PRIMIDONE 100 MG: 50 TABLET ORAL at 08:38

## 2024-08-16 RX ADMIN — PRIMIDONE 100 MG: 50 TABLET ORAL at 20:30

## 2024-08-16 RX ADMIN — SODIUM CHLORIDE: 9 INJECTION, SOLUTION INTRAVENOUS at 06:04

## 2024-08-16 RX ADMIN — FOLIC ACID 1 MG: 1 TABLET ORAL at 08:39

## 2024-08-16 RX ADMIN — CARVEDILOL 12.5 MG: 12.5 TABLET, FILM COATED ORAL at 16:04

## 2024-08-16 RX ADMIN — METOPROLOL TARTRATE 2.5 MG: 5 INJECTION INTRAVENOUS at 06:04

## 2024-08-16 RX ADMIN — METOPROLOL TARTRATE 2.5 MG: 5 INJECTION INTRAVENOUS at 00:40

## 2024-08-16 RX ADMIN — METOPROLOL TARTRATE 2.5 MG: 5 INJECTION INTRAVENOUS at 23:05

## 2024-08-16 ASSESSMENT — PAIN DESCRIPTION - PAIN TYPE
TYPE: CHRONIC PAIN
TYPE: CHRONIC PAIN

## 2024-08-16 ASSESSMENT — PAIN SCALES - GENERAL
PAINLEVEL_OUTOF10: 0
PAINLEVEL_OUTOF10: 6
PAINLEVEL_OUTOF10: 5
PAINLEVEL_OUTOF10: 3
PAINLEVEL_OUTOF10: 0
PAINLEVEL_OUTOF10: 3
PAINLEVEL_OUTOF10: 0

## 2024-08-16 ASSESSMENT — PAIN DESCRIPTION - LOCATION
LOCATION: BACK
LOCATION: BACK;GENERALIZED

## 2024-08-16 ASSESSMENT — PAIN DESCRIPTION - DESCRIPTORS
DESCRIPTORS: ACHING
DESCRIPTORS: ACHING

## 2024-08-16 ASSESSMENT — PAIN DESCRIPTION - FREQUENCY: FREQUENCY: INTERMITTENT

## 2024-08-16 NOTE — PROGRESS NOTES
Patient currently on PT/OT caseload, however patient transferred to ICU from 467 due to medical decline. Pt currently placed on hold and current PT/OT orders will be discontinued at this time. Will need new PT/OT evaluation order once pt medically stable for (re)assessment. Thank you.

## 2024-08-16 NOTE — PROGRESS NOTES
Hospitalist Progress Note    NAME:   Abel Cruz   : 1940   MRN: [id number]     Date/Time: 2024 12:04 PM  Patient PCP: Jeremy Grimes MD    Estimated discharge date: 48 hours  Barriers: Cardiology clearance and possible cath    Hospital course:  Patient is an 83-year-old male with a past medical history of hypertension, hyperlipidemia, tremors, CKD s/p left nephrectomy, prostate cancer and anemia of chronic disease who was admitted on 2024 with an abnormal CT of the chest concerning for malignancy.  CXR revealed right pleural effusion, at least moderate size.  Associated asymptomatic right lung pulmonary edema versus opacities.  CT of the chest from 2024 revealed innumerable tiny miliary nodules throughout both lungs with large pulmonary nodule located within the right lower lobe measuring approximately 6 mm in diameter.  None of pulmonary nodules are amenable to image guided percutaneous biopsy.  Moderately large right pleural effusion with associated right lower lobe atelectasis.  Postoperative changes from remote left nephrectomy.  Pulmonology consulted for right pleural effusion and oncology for concerns of malignancy.  Also, consult urology for right hydronephrosis with history of left nephrectomy and nephrology for evaluation of severe metabolic acidosis with history of CKD.  Nephrology about a patient and recommended providing patient with 1 amp of sodium bicarb now with addition of sodium bicarb tablets 13 mg twice daily and sodium bicarb drip and D5 at 75 cc/h.  Also, recommended placing Kim catheter until urology can evaluate for hydroureteronephrosis of the right kidney.  Urology evaluate patient and planning for cystoscopy with retrograde pyelogram and possible stent placement of the right ureter and kidney on 2024.  Pulmonology about a patient recommended right sided thoracentesis, which was complete on 2024 by IR removing 1500 cc of thin red fluid from the         I had a face to face encounter and independently examined this patient on 8/16/2024, as outlined below:    Review of Systems   Constitutional:  Negative for activity change, appetite change, chills and fever.   HENT:  Negative for congestion, rhinorrhea, sore throat and trouble swallowing.    Eyes: Negative.    Respiratory:  Negative for cough, shortness of breath and wheezing.    Cardiovascular:  Negative for chest pain, palpitations and leg swelling.   Gastrointestinal:  Negative for abdominal pain, diarrhea, nausea and vomiting.   Endocrine: Negative.    Genitourinary:  Negative for dysuria, flank pain and hematuria.   Musculoskeletal:  Negative for back pain, neck pain and neck stiffness.   Skin: Negative.    Allergic/Immunologic: Negative.    Neurological:  Negative for dizziness, weakness, numbness and headaches.   Hematological: Negative.    Psychiatric/Behavioral: Negative.     All other systems reviewed and are negative.       PHYSICAL EXAM:  Physical Exam  Vitals reviewed.   Constitutional:       General: He is not in acute distress.     Appearance: He is ill-appearing.   HENT:      Head: Normocephalic and atraumatic.   Eyes:      Extraocular Movements: Extraocular movements intact.   Cardiovascular:      Rate and Rhythm: Regular rhythm. Tachycardia present.   Pulmonary:      Effort: Pulmonary effort is normal. No respiratory distress.      Breath sounds: Normal breath sounds. No wheezing.   Abdominal:      General: Abdomen is flat.      Palpations: Abdomen is soft.      Tenderness: There is no abdominal tenderness.   Genitourinary:     Comments: Indwelling Kim catheter in place  Musculoskeletal:         General: No tenderness. Normal range of motion.      Cervical back: Normal range of motion and neck supple. No tenderness.      Right lower leg: No edema.      Left lower leg: No edema.   Neurological:      Mental Status: He is alert and oriented to person, place, and time.        Reviewed most

## 2024-08-16 NOTE — PROGRESS NOTES
NAME:  Abel Cruz   :   1940   MRN:   [id number]     ATTENDING: Nils Malik Cha, MD  PCP:  Jeremy Grimes MD    Date/Time:  2024       Subjective:     Patient seen in the ICU this morning, was comfortably resting and not in any acute distress.  Apparently he continued to have SVT episodes, cardiology monitoring closely    Past Medical History:   Diagnosis Date    Hypertension       Past Surgical History:   Procedure Laterality Date    KIDNEY REMOVAL      over 20 years ago     Social History     Tobacco Use    Smoking status: Former     Types: Cigarettes    Smokeless tobacco: Not on file   Substance Use Topics    Alcohol use: Not on file      History reviewed. No pertinent family history.    Allergies   Allergen Reactions    Naproxen Other (See Comments)     Gastric Ulcer With Hemorrhage.    Lisinopril Nausea And Vomiting    Morphine Rash      Prior to Admission medications    Medication Sig Start Date End Date Taking? Authorizing Provider   latanoprost (XALATAN) 0.005 % ophthalmic solution Place 1 drop into both eyes nightly   Yes Mitesh Daily MD   Calcium Citrate-Vitamin D (SM CALCIUM CITRATE W/VIT D3 PO) Take 1,000 mg by mouth Daily   Yes Mitesh Daily MD   lidocaine (BLUE-EMU PAIN RELIEF DRY) 4 % external patch Place 1 patch onto the skin daily   Yes Mitesh Daily MD   ferrous sulfate (IRON 325) 325 (65 Fe) MG tablet Take 1 tablet by mouth daily (with breakfast)   Yes Mitesh Daily MD   atorvastatin (LIPITOR) 40 MG tablet Take 0.5 tablets by mouth daily 24  Yes Mitesh Daily MD   cyanocobalamin 500 MCG tablet Take 2 tablets by mouth daily   Yes Mitesh Daily MD   hydrALAZINE (APRESOLINE) 25 MG tablet Take 1 tablet by mouth 3 times daily   Yes Mitesh Daily MD   megestrol (MEGACE) 40 MG/ML suspension Take 10 mLs by mouth daily 24  Yes Mitesh Daily MD   primidone (MYSOLINE) 50 MG tablet Take 150 tablets by mouth 3 times daily  thoracentesis 8/13, 1.5 L drained, it is exudative with protein 5.5 polys 82.  Official cytology results are pending    7. Hydroureteronephrosis on the right kidney  Has been evaluated by urology and plan for cystoscopy and right retrograde pyelogram on 8/14 was canceled because of SVT.  Now in sinus rhythm    Signed: Lindsey Hinton MD

## 2024-08-16 NOTE — PROGRESS NOTES
Pulmonary/ CC progress note    Subjective:     Mr. Abel Cruz is an 83 years old -American male who is known to have history of hypertension, he also has history of left nephrectomy in the past for recurrent kidney stones..  Patient is referred by her family physician because of increasing right lower chest discomfort on coughing and taking deep breath.  This has been going on for last week to 10 days.  His pain is quite intense 7 out of 10 intensity.  Patient has lost his appetite and has lost about 10 to 15 pounds over last couple of months.  He lives by himself.  He noted that he lives in [facility].  About a month ago he had CT scan of his spine done for his back pain and right lower chest pains.  He was informed that he has spots on his lung and fluid around his lung and he was getting set for evaluation by specialist for these issues.    He has now ended up in the hospital.  He has poor vision.  Had complaints of some vague abdominal discomfort.  He is ex-smoker who quit smoking in 1980.  He smoked for about 25 years 1 pack/day before he quit smoking.  He also drinks on regular basis but last drink was about a month ago.    Initial workup showed tiny bilateral multiple pulmonary nodules along with moderate-sized right pleural effusion.  He is also noted to have elevated urine creatinine of 90 and 4.16 on admission.  Noted to have hemoglobin of 9.6 g.  He is not sure about altered bowel movements.    8/13/2024  Patient examined at bedside.  Still has complaints of pain in the right lower costal area.  Denies any respiratory distress.  Had thoracentesis done about 1500 cc of fluid was drained.  8/14/24  Patient examined at bedside  Without any distress  Pleuritic chest pain has improved.  Appetite is marginal.  8/15/2024  Patient examined at bedside.  Remains weak and frail.  No fever or chills noted.  Developed SVT yesterday, was started on IV Cardizem, got converted into sinus  ultrasound-guided thoracentesis   Approximately 1 1.5 L of mildly sanguinous fluid evacuated from the right   pleural space.               Electronically signed by DALIA TREJO         Data Review:   Current Facility-Administered Medications   Medication Dose Route Frequency    ciprofloxacin (CIPRO) tablet 500 mg  500 mg Oral Daily    carvedilol (COREG) tablet 6.25 mg  6.25 mg Oral BID WC    metoprolol (LOPRESSOR) injection 2.5 mg  2.5 mg IntraVENous Q1H PRN    sodium bicarbonate tablet 650 mg  650 mg Oral BID    0.9 % sodium chloride infusion   IntraVENous Continuous    epoetin paul-epbx (RETACRIT) injection 10,000 Units  10,000 Units SubCUTAneous Weekly    [Held by provider] propranolol (INDERAL LA) extended release capsule 80 mg  80 mg Oral Daily    folic acid (FOLVITE) tablet 1 mg  1 mg Oral Daily    sodium chloride flush 0.9 % injection 5-40 mL  5-40 mL IntraVENous 2 times per day    sodium chloride flush 0.9 % injection 5-40 mL  5-40 mL IntraVENous PRN    0.9 % sodium chloride infusion   IntraVENous PRN    ondansetron (ZOFRAN-ODT) disintegrating tablet 4 mg  4 mg Oral Q8H PRN    Or    ondansetron (ZOFRAN) injection 4 mg  4 mg IntraVENous Q6H PRN    polyethylene glycol (GLYCOLAX) packet 17 g  17 g Oral Daily PRN    acetaminophen (TYLENOL) tablet 650 mg  650 mg Oral Q6H PRN    Or    acetaminophen (TYLENOL) suppository 650 mg  650 mg Rectal Q6H PRN    atorvastatin (LIPITOR) tablet 20 mg  20 mg Oral Daily    hydrALAZINE (APRESOLINE) tablet 25 mg  25 mg Oral TID    megestrol (MEGACE) 40 MG/ML suspension 400 mg  400 mg Oral Daily    primidone (MYSOLINE) tablet 100 mg  100 mg Oral BID        Exam:    General:  Alert, cooperative, thinly built.  Looks frail.     Eyes:  Sclera anicteric. Pupils equally round and reactive to light.   Mouth/Throat: Mucous membranes normal, oral pharynx clear   Neck: Supple, has lipomatous growths in the anterior cervical area on the right side   Lungs:   Has significantly decreased air

## 2024-08-16 NOTE — CARE COORDINATION
CM reviewed Pt medicals, Pt lives alone and was IND with ADL    Pt lives in [place], [place]    Will need PT/OT eval/recommendations.

## 2024-08-16 NOTE — PROGRESS NOTES
right posterior chest.  S/p thoracentesis CXR revealed no pneumothorax.  Heme-onc evaluated patient and appears patient is active with [facility] with Dr. Sue which ordered CT of the chest for malignancy concerns.  Patient already receiving treatment for his iron deficiency with intermittent IV iron.  SPEP without monoclonal protein.  Patient lives in [place] and prefers to continue outpatient care there.  Patient will need outpatient PET scan. Patient was scheduled for cystoscopy with urology today.  However, rapid response called on 8/14/2024 as patient was sustaining heart rates in the 180s.  Evaluated patient at bedside and he denied any chest pain on initial evaluation so ordered stat EKG and provided patient with 10 mg of metoprolol IV.  However, shortly after patient began complaining of chest pain so ordered stat troponin with repeat in 90 minutes.  EKG reviewed and patient in SVT sustaining rates in the 150s to 160s.  Placed cardiology consult and ordered a dose of adenosine.  However, patient kicked out of SVT back into a sinus rhythm with rate of 87 bpm.  Will transfer patient to cardiac unit for continued monitoring.  Also, due to described events patient's cystoscopy will be canceled for today and likely had to be rescheduled after cardiac evaluation and clearance for procedure.  Urology reevaluate patient and reports patient will need outpatient follow-up with urology to discuss timing of diagnostic workup.  Can attempt voiding trial closer to discharge.  Rapid response called on 8/15/2024 as patient returned to SVT sustaining a rate in the 180s.  Patient was provided 1 dose of IV diltiazem 10 mg and returned to sinus rhythm with rate sustaining in the low 100s. Pleural fluid collected from a thoracentesis revealed no cells diagnostic for malignancy. Cardiology evaluate patient and recommended obtaining echo to assess cardiac structure and function.  Also, initiated patient on low-dose beta-blocker.  for activity change, appetite change, chills and fever.   HENT:  Negative for congestion, rhinorrhea, sore throat and trouble swallowing.    Eyes: Negative.    Respiratory:  Negative for cough, shortness of breath and wheezing.    Cardiovascular:  Negative for chest pain, palpitations and leg swelling.   Gastrointestinal:  Negative for abdominal pain, diarrhea, nausea and vomiting.   Endocrine: Negative.    Genitourinary:  Negative for dysuria, flank pain and hematuria.   Musculoskeletal:  Negative for back pain, neck pain and neck stiffness.   Skin: Negative.    Allergic/Immunologic: Negative.    Neurological:  Negative for dizziness, weakness, numbness and headaches.   Hematological: Negative.    Psychiatric/Behavioral: Negative.     All other systems reviewed and are negative.       PHYSICAL EXAM:  Physical Exam  Vitals reviewed.   Constitutional:       General: He is not in acute distress.     Appearance: He is ill-appearing.   HENT:      Head: Normocephalic and atraumatic.   Eyes:      Extraocular Movements: Extraocular movements intact.   Cardiovascular:      Rate and Rhythm: Regular rhythm. Tachycardia present.   Pulmonary:      Effort: Pulmonary effort is normal. No respiratory distress.      Breath sounds: Normal breath sounds. No wheezing.   Abdominal:      General: Abdomen is flat.      Palpations: Abdomen is soft.      Tenderness: There is no abdominal tenderness.   Genitourinary:     Comments: Indwelling Kim catheter in place  Musculoskeletal:         General: No tenderness. Normal range of motion.      Cervical back: Normal range of motion and neck supple. No tenderness.      Right lower leg: No edema.      Left lower leg: No edema.   Neurological:      Mental Status: He is alert and oriented to person, place, and time.        Reviewed most current lab test results and cultures  YES  Reviewed most current radiology test results   YES  Review and summation of old records today    NO  Reviewed

## 2024-08-16 NOTE — PROGRESS NOTES
CARDIOLOGY PROGRESS NOTE      Patient Name: Abel Cruz  Age: 83 y.o.  Gender:male  :1940  MRN: [id number]    Patient seen and examined. This is a patient with a history of hypertension, hyperlipidemia, tremors, CKD s/p left nephrectomy, prostate cancer, chronic anemia  who presented with pleuritic pain, cough now being followed for SVT. Moved to ICU due to SVT yesterday.  Still with some brief episodes.  No chest pain or shortness of breath.  Denies palpitations.  No other complaints reported.    Telemetry reviewed, there were no events noted in the past 24 hours.    Pertinent review of systems items noted above, all other systems are negative. Current medications reviewed.    Physical Examination    Allergies   Allergen Reactions    Naproxen Other (See Comments)     Gastric Ulcer With Hemorrhage.    Lisinopril Nausea And Vomiting    Morphine Rash     Vitals:    24 1600   BP: (!) 135/110   Pulse: (!) 111   Resp: 15   Temp:    SpO2: 98%     Vital signs are stable  No apparent distress, frail, chronically ill appearing  Heart has a mildly tachycardic rate and regular rhythm.  no murmur  Lungs are diminished  Abdomen is soft, nontender, normal bowel sounds.  Extremities have no edema  Skin is dry and warm.  Normal affect    Labs reviewed:  Recent Results (from the past 12 hour(s))   Potassium    Collection Time: 24  8:16 AM   Result Value Ref Range    Potassium 4.7 3.5 - 5.1 mmol/L   Magnesium    Collection Time: 24  8:16 AM   Result Value Ref Range    Magnesium 2.0 1.6 - 2.4 mg/dL        Case discussed with Dr. Rae and our impression and recommendations are as follows:  PSVT: ST at present   Labs noncontributory  Continue telemetry monitoring.   EF 30-35%, global hypokinesis on echo  Increase coreg to 12.5mg  Will need an ischemic evaluation but would like to get prognosis from pulm/oncology first; can be done as an outpt.   Consider infectious work up with higher heart rates/lower

## 2024-08-16 NOTE — PROGRESS NOTES
--   08/15/24 1735 (!) 137/100 -- -- (!) 172 20 97 % -- --       Review of Systems:    Constitutional No fevers, chills, night sweats, excessive fatigue or weight loss.   Allergic/Immunologic No recent allergic reactions   Eyes No significant visual difficulties. No diplopia.   ENMT No problems with hearing, no sore throat, no sinus drainage.   Endocrine No hot flashes or night sweats. No cold intolerance, polyuria, or polydipsia   Hematologic/Lymphatic No easy bruising or bleeding.  The patient denies any tender or palpable lymph nodes   Breasts No abnormal masses of breast, nipple discharge or pain.   Respiratory No dyspnea on exertion, orthopnea, chest pain, cough or hemoptysis.   Cardiovascular No anginal chest pain, irregular heart beat, tachycardia, palpitations or orthopnea.   Gastrointestinal No nausea, vomiting, diarrhea, constipation, cramping, dysphagia, reflux, heartburn, GI bleeding, or early satiety.  No change in bowel habits.   Genitourinary (M) No hematuria, dysuria, increased frequency, urgency, hesitancy or incontinence.   Musculoskeletal No joint pain, swelling or redness. No decreased range of motion.   Integumentary No chronic rashes, inflammation, ulcerations, pruritus, petechiae, purpura, ecchymoses, or skin changes.   Neurologic No headache, blurred vision, and no areas of focal weakness or numbness. Normal gait. No sensory problems.   Psychiatric No insomnia, depression, selvin or mood swings.  No psychotropic drugs       Physical Examination:  Constitutional   respiratory support   Head Normocephalic; no scars   Eyes Conjunctivae and sclerae are clear and without icterus. Pupils are reactive and equal.   ENMT Sinuses are nontender.  No oral exudates, ulcers, masses, thrush or mucositis. Oropharynx clear.  Tongue normal.   Neck Supple without masses or thyromegaly. No jugular venous distension.   Hematologic/Lymphatic No petechiae or purpura.  No tender or palpable lymph nodes in the  cervical, supraclavicular, axillary or inguinal area.   Respiratory Lungs are clear to auscultation without rhonchi or wheezing.   Cardiovascular Regular rate and rhythm of heart without murmurs, gallops or rubs.   Chest / Line Site Chest is symmetric with no chest wall deformities.   Abdomen Non-tender, non-distended, no masses, ascites or hepatosplenomegaly. Good bowel sounds. No guarding or rebound tenderness. No pulsatile masses.   Musculoskeletal No tenderness or swelling, normal range of motion without obvious weakness.   Extremities No visible deformities, no cyanosis, clubbing or edema.    Skin No rashes, scars, or lesions suggestive of malignancy. No petechiae, purpura, or ecchymoses. No excoriations.    Neurologic No sensory or motor deficits, normal cerebellar function, normal gait, cranial nerves intact.   Psychiatric Alert and oriented times three. Coherent speech. Verbalizes understanding of our discussions today.       Labs:  Recent Results (from the past 24 hour(s))   EKG 12 Lead    Collection Time: 08/15/24  5:33 PM   Result Value Ref Range    Ventricular Rate 163 BPM    Atrial Rate 163 BPM    P-R Interval 126 ms    QRS Duration 84 ms    Q-T Interval 302 ms    QTc Calculation (Bazett) 497 ms    R Axis -48 degrees    T Axis 206 degrees    Diagnosis       Possible Atrial Flutter  Left axis deviation  Inferior infarct , age undetermined  ST & T wave abnormality, consider lateral ischemia  Abnormal ECG    Confirmed by ALYCIA VELASQUEZ ([id number]) on 8/16/2024 9:15:30 AM     Renal Function Panel    Collection Time: 08/16/24  2:00 AM   Result Value Ref Range    Sodium 138 136 - 145 mmol/L    Potassium 5.2 (H) 3.5 - 5.1 mmol/L    Chloride 109 (H) 97 - 108 mmol/L    CO2 22 21 - 32 mmol/L    Anion Gap 7 5 - 15 mmol/L    Glucose 112 (H) 65 - 100 mg/dL    BUN 56 (H) 6 - 20 mg/dL    Creatinine 2.68 (H) 0.70 - 1.30 mg/dL    BUN/Creatinine Ratio 21 (H) 12 - 20      Est, Glom Filt Rate 23 (L) >60 ml/min/1.73m2

## 2024-08-17 LAB
EKG ATRIAL RATE: 120 BPM
EKG DIAGNOSIS: NORMAL
EKG P AXIS: 63 DEGREES
EKG P-R INTERVAL: 136 MS
EKG Q-T INTERVAL: 338 MS
EKG QRS DURATION: 90 MS
EKG QTC CALCULATION (BAZETT): 477 MS
EKG R AXIS: -46 DEGREES
EKG T AXIS: 88 DEGREES
EKG VENTRICULAR RATE: 120 BPM

## 2024-08-17 PROCEDURE — 6370000000 HC RX 637 (ALT 250 FOR IP): Performed by: STUDENT IN AN ORGANIZED HEALTH CARE EDUCATION/TRAINING PROGRAM

## 2024-08-17 PROCEDURE — 6370000000 HC RX 637 (ALT 250 FOR IP): Performed by: INTERNAL MEDICINE

## 2024-08-17 PROCEDURE — 94761 N-INVAS EAR/PLS OXIMETRY MLT: CPT

## 2024-08-17 PROCEDURE — 2580000003 HC RX 258: Performed by: INTERNAL MEDICINE

## 2024-08-17 PROCEDURE — 6370000000 HC RX 637 (ALT 250 FOR IP): Performed by: NURSE PRACTITIONER

## 2024-08-17 PROCEDURE — 2700000000 HC OXYGEN THERAPY PER DAY

## 2024-08-17 PROCEDURE — 2000000000 HC ICU R&B

## 2024-08-17 RX ADMIN — CARVEDILOL 12.5 MG: 12.5 TABLET, FILM COATED ORAL at 17:20

## 2024-08-17 RX ADMIN — CARVEDILOL 12.5 MG: 12.5 TABLET, FILM COATED ORAL at 09:58

## 2024-08-17 RX ADMIN — ATORVASTATIN CALCIUM 20 MG: 20 TABLET, FILM COATED ORAL at 09:58

## 2024-08-17 RX ADMIN — MEGESTROL ACETATE 400 MG: 40 SUSPENSION ORAL at 09:58

## 2024-08-17 RX ADMIN — PRIMIDONE 100 MG: 50 TABLET ORAL at 21:27

## 2024-08-17 RX ADMIN — SODIUM CHLORIDE, PRESERVATIVE FREE 10 ML: 5 INJECTION INTRAVENOUS at 09:58

## 2024-08-17 RX ADMIN — PRIMIDONE 100 MG: 50 TABLET ORAL at 09:58

## 2024-08-17 RX ADMIN — SODIUM BICARBONATE 650 MG: 650 TABLET ORAL at 21:27

## 2024-08-17 RX ADMIN — SODIUM CHLORIDE, PRESERVATIVE FREE 10 ML: 5 INJECTION INTRAVENOUS at 21:27

## 2024-08-17 RX ADMIN — SODIUM BICARBONATE 650 MG: 650 TABLET ORAL at 09:58

## 2024-08-17 RX ADMIN — FOLIC ACID 1 MG: 1 TABLET ORAL at 09:58

## 2024-08-17 RX ADMIN — CIPROFLOXACIN HYDROCHLORIDE 500 MG: 500 TABLET, FILM COATED ORAL at 09:58

## 2024-08-17 ASSESSMENT — PAIN SCALES - GENERAL
PAINLEVEL_OUTOF10: 0
PAINLEVEL_OUTOF10: 2

## 2024-08-17 NOTE — PROGRESS NOTES
Pulmonary/ CC progress note    Subjective:       8/16/2024  Patient got transferred to ICU as he developed SVT  IV Cardizem was given and patient got transferred to ICU for further management.  Started on p.o. metoprolol.  Patient denies any chest pains or any significant palpitations.  Potassium is slightly high, renal functions are showing improvement.    Today relatively stable in ICU  Overnight events noted    On 1 L nasal cannula oxygen  No acute distress    Patient Active Problem List   Diagnosis    Pleural effusion    Hydronephrosis     Past Medical History:   Diagnosis Date    Hypertension       History reviewed. No pertinent family history.   Social History     Tobacco Use    Smoking status: Former     Types: Cigarettes    Smokeless tobacco: Not on file   Substance Use Topics    Alcohol use: Not on file     Past Surgical History:   Procedure Laterality Date    KIDNEY REMOVAL      over 20 years ago      Prior to Admission medications    Medication Sig Start Date End Date Taking? Authorizing Provider   latanoprost (XALATAN) 0.005 % ophthalmic solution Place 1 drop into both eyes nightly   Yes Mitesh Daily MD   Calcium Citrate-Vitamin D (SM CALCIUM CITRATE W/VIT D3 PO) Take 1,000 mg by mouth Daily   Yes Mitesh Daily MD   lidocaine (BLUE-EMU PAIN RELIEF DRY) 4 % external patch Place 1 patch onto the skin daily   Yes Mitesh Daily MD   ferrous sulfate (IRON 325) 325 (65 Fe) MG tablet Take 1 tablet by mouth daily (with breakfast)   Yes Mitesh Daily MD   atorvastatin (LIPITOR) 40 MG tablet Take 0.5 tablets by mouth daily 7/31/24  Yes Mitesh Daily MD   cyanocobalamin 500 MCG tablet Take 2 tablets by mouth daily   Yes Mitesh Daily MD   hydrALAZINE (APRESOLINE) 25 MG tablet Take 1 tablet by mouth 3 times daily   Yes Mitesh Daily MD   megestrol (MEGACE) 40 MG/ML suspension Take 10 mLs by mouth daily 7/29/24  Yes Mitesh Daily MD   primidone  weeks.  On admission his BUN was 4.6 and creatinine was 91.  With hydration it is gradually improving.  Nephrology consult and input was appreciated.  With hydration renal functions are gradually improving.    5.  SVT  Developed SVT.  Given IV Cardizem bolus and patient got started on p.o. metoprolol  Will need cardiac stress testing and workup.    6.  Right hydronephrosis  Patient is status post left nephrectomy in the past.  Will get cystoscopy once cleared by cardiology.    Questions of patient were answered at bedside in detail  Case discussed in detail with RN, RT, and care team  Thank you for involving me in the care of this patient  I will follow with you closely during hospitalization    Time spent more than 30 minutes under patient care with no overlap reviewing results and records, decision making, and answering questions.    Lew Daugherty MD  [facility] ([facility])     This dictation was done by dragon, computer voice recognition software.  Often unanticipated grammatical, syntax, [place] phones and other interpretive errors are inadvertently transcribed.  Please excuse errors that have escaped final proofreading.

## 2024-08-17 NOTE — PROGRESS NOTES
CARDIOLOGY PROGRESS NOTE      Patient Name: Abel Cruz  Age: 83 y.o.  Gender:male  :1940  MRN: [id number]    Patient seen and examined. This is a patient with a history of hypertension, hyperlipidemia, tremors, CKD s/p left nephrectomy, prostate cancer, chronic anemia  who presented with pleuritic pain, cough now being followed for SVT.      remains in ICU. No recurrent SVT.  No chest pain or shortness of breath.  Denies palpitations.  No other complaints reported.    Telemetry reviewed, there were no events noted in the past 24 hours.    Pertinent review of systems items noted above, all other systems are negative. Current medications reviewed.    Physical Examination    Allergies   Allergen Reactions    Naproxen Other (See Comments)     Gastric Ulcer With Hemorrhage.    Lisinopril Nausea And Vomiting    Morphine Rash     Vitals:    24 1600   BP: 106/83   Pulse: (!) 108   Resp: 17   Temp:    SpO2: 95%     Vital signs are stable  No apparent distress, frail, chronically ill appearing  Heart has a mildly tachycardic rate and regular rhythm.  no murmur  Lungs are diminished  Abdomen is soft, nontender, normal bowel sounds.  Extremities have no edema  Skin is dry and warm.  Normal affect    Labs reviewed:  No results found for this or any previous visit (from the past 12 hour(s)).       Impression and recommendations are as follows:  PSVT: ST at present   Labs noncontributory  Continue telemetry monitoring.   EF 30-35%, global hypokinesis on echo  Continue coreg 12.5mg  Will need an ischemic evaluation but would like to get prognosis from pulm/oncology first; can be done as an outpt.   Consider infectious work up with higher heart rates/lower blood pressures  Cardiomyopathy: EF 30-35%, new finding.   Euvolemic.   Will continue BB.   GDMT limited by renal function and solitary kidney.  Hypertension:   blood pressure is labile  Stopped amlodipine to allow for GDMT, now with low bps will stop  hydralazine  Continue to monitor.   Hyperlipidemia: Continue statin therapy  Pulmonary nodules w/ rt sided pleural effusion: s/p thoracentesis, concern for malignancy, pulmonology following.   Rt hydronephrosis, NETO: s/p left nephrectomy in the 1980s per pt. Creat elevated, improving. Nephrology and urology on board. Cystoscopy cancelled, pt will fu outpt.  Essential tremors: on propranolol at home (now on hold), primidone  Anemia: chronic. Per primary     Please do not hesitate to call if additional questions arise.    Anibal Washington MD  8/17/2024

## 2024-08-17 NOTE — PROGRESS NOTES
NAME:  Abel Cruz   :   1940   MRN:   [id number]     ATTENDING: Nils Malik Cha, MD  PCP:  Jeremy Grimes MD    Date/Time:  2024       Subjective:     Patient seen in the ICU this morning, comfortably resting.  Still on supplemental oxygen.    Past Medical History:   Diagnosis Date    Hypertension       Past Surgical History:   Procedure Laterality Date    KIDNEY REMOVAL      over 20 years ago     Social History     Tobacco Use    Smoking status: Former     Types: Cigarettes    Smokeless tobacco: Not on file   Substance Use Topics    Alcohol use: Not on file      History reviewed. No pertinent family history.    Allergies   Allergen Reactions    Naproxen Other (See Comments)     Gastric Ulcer With Hemorrhage.    Lisinopril Nausea And Vomiting    Morphine Rash      Prior to Admission medications    Medication Sig Start Date End Date Taking? Authorizing Provider   latanoprost (XALATAN) 0.005 % ophthalmic solution Place 1 drop into both eyes nightly   Yes Mitesh Daily MD   Calcium Citrate-Vitamin D (SM CALCIUM CITRATE W/VIT D3 PO) Take 1,000 mg by mouth Daily   Yes Mitesh Daily MD   lidocaine (BLUE-EMU PAIN RELIEF DRY) 4 % external patch Place 1 patch onto the skin daily   Yes Mitesh Daily MD   ferrous sulfate (IRON 325) 325 (65 Fe) MG tablet Take 1 tablet by mouth daily (with breakfast)   Yes Mitesh Daily MD   atorvastatin (LIPITOR) 40 MG tablet Take 0.5 tablets by mouth daily 24  Yes Mitesh Daily MD   cyanocobalamin 500 MCG tablet Take 2 tablets by mouth daily   Yes Mitesh Daily MD   hydrALAZINE (APRESOLINE) 25 MG tablet Take 1 tablet by mouth 3 times daily   Yes Mitesh Daily MD   megestrol (MEGACE) 40 MG/ML suspension Take 10 mLs by mouth daily 24  Yes Mitesh Daily MD   primidone (MYSOLINE) 50 MG tablet Take 150 tablets by mouth 3 times daily 22  Yes Mitesh Daily MD   propranolol (INNOPRAN XL) 80 MG extended

## 2024-08-17 NOTE — PROGRESS NOTES
Hospitalist Progress Note    NAME:   Abel Cruz   : 1940   MRN: [id number]     Date/Time: 2024 10:24 AM  Patient PCP: Jeremy Grimes MD    Estimated discharge date: 48 hours  Barriers: Stabilize tachycardia, void trial closer to discharge repeat CXR    Hospital course:  Patient is an 83-year-old male with a past medical history of hypertension, hyperlipidemia, tremors, CKD s/p left nephrectomy, prostate cancer and anemia of chronic disease who was admitted on 2024 with an abnormal CT of the chest concerning for malignancy.  CXR revealed right pleural effusion, at least moderate size.  Associated asymptomatic right lung pulmonary edema versus opacities.  CT of the chest from 2024 revealed innumerable tiny miliary nodules throughout both lungs with large pulmonary nodule located within the right lower lobe measuring approximately 6 mm in diameter.  None of pulmonary nodules are amenable to image guided percutaneous biopsy.  Moderately large right pleural effusion with associated right lower lobe atelectasis.  Postoperative changes from remote left nephrectomy.  Pulmonology consulted for right pleural effusion and oncology for concerns of malignancy.  Also, consult urology for right hydronephrosis with history of left nephrectomy and nephrology for evaluation of severe metabolic acidosis with history of CKD.  Nephrology about a patient and recommended providing patient with 1 amp of sodium bicarb now with addition of sodium bicarb tablets 13 mg twice daily and sodium bicarb drip and D5 at 75 cc/h.  Also, recommended placing Kim catheter until urology can evaluate for hydroureteronephrosis of the right kidney.  Urology evaluate patient and planning for cystoscopy with retrograde pyelogram and possible stent placement of the right ureter and kidney on 2024.  Pulmonology about a patient recommended right sided thoracentesis, which was complete on 2024 by IR removing 1500 cc of  recommended obtaining echo to assess cardiac structure and function.  Also, initiated patient on low-dose beta-blocker.  Urine collected from Kim catheter revealed large leukoesterase and 20-50 WBCs so will initiate patient on 7-day course of p.o. Cipro.  Echo completed revealed severely reduced left ventricular systolic function with a EF of 30 to 35%.  Moderately increased wall thickness.  Global hypokinesis.  Grade 1 diastolic dysfunction with normal LAP.  Cardiology increased patient's Coreg to 12.5 twice daily. Will uptitrate as needed for BP control.  Also, recommended avoiding diltiazem with low EF.  Will need outpatient cardiomyopathy workup after discussing prognosis with oncology, pulmonology and improvement renal function.  Patient transferred to the ICU on 8/15/2024 after continue experience frequent episodes of SVT requiring medication administration.  Initiated patient on as needed IV metoprolol for persistent tachycardia.  Patient requiring supplemental oxygen again so will repeat CXR for possible repeat thoracentesis if needed.    Assessment / Plan:  Abnormal CT of the chest concerning for malignancy  -Pulmonology eval to patient and fill this could be metastatic disease from colon/kidney.  However, pulmonary arteries are tiny and not amenable for bronchoscopy, biopsy or CT-guided biopsy.  Will follow pleural fluid studies from right-sided thoracentesis to determine nature of pleural fluid.  -Oncology evaluated patient and patient would need outpatient PET scan    Right-sided pleural effusion  Acute hypoxic respiratory failure secondary to above requiring 2 L of supplemental oxygen via nasal cannula  -Due to pleural cytology being negative pulmonology recommending outpatient PET scan for further evaluation of possible malignancy  -IR complete right-sided thoracentesis removing 1500 cc of thin red fluid from right posterior chest  -Pleural fluid collected from a thoracentesis revealed no cells

## 2024-08-18 ENCOUNTER — APPOINTMENT (OUTPATIENT)
Facility: HOSPITAL | Age: 84
DRG: 204 | End: 2024-08-18
Payer: OTHER GOVERNMENT

## 2024-08-18 LAB
ANION GAP SERPL CALC-SCNC: 7 MMOL/L (ref 5–15)
BASOPHILS # BLD: 0 K/UL (ref 0–0.1)
BASOPHILS NFR BLD: 1 % (ref 0–1)
BUN SERPL-MCNC: 63 MG/DL (ref 6–20)
BUN/CREAT SERPL: 21 (ref 12–20)
CA-I BLD-MCNC: 8.9 MG/DL (ref 8.5–10.1)
CHLORIDE SERPL-SCNC: 108 MMOL/L (ref 97–108)
CO2 SERPL-SCNC: 22 MMOL/L (ref 21–32)
CREAT SERPL-MCNC: 3.05 MG/DL (ref 0.7–1.3)
DIFFERENTIAL METHOD BLD: ABNORMAL
EOSINOPHIL # BLD: 0.2 K/UL (ref 0–0.4)
EOSINOPHIL NFR BLD: 3 % (ref 0–7)
ERYTHROCYTE [DISTWIDTH] IN BLOOD BY AUTOMATED COUNT: 17.1 % (ref 11.5–14.5)
GLUCOSE SERPL-MCNC: 114 MG/DL (ref 65–100)
HCT VFR BLD AUTO: 25.4 % (ref 36.6–50.3)
HGB BLD-MCNC: 8.1 G/DL (ref 12.1–17)
IMM GRANULOCYTES # BLD AUTO: 0 K/UL (ref 0–0.04)
IMM GRANULOCYTES NFR BLD AUTO: 1 % (ref 0–0.5)
LYMPHOCYTES # BLD: 0.4 K/UL (ref 0.8–3.5)
LYMPHOCYTES NFR BLD: 6 % (ref 12–49)
MCH RBC QN AUTO: 31.2 PG (ref 26–34)
MCHC RBC AUTO-ENTMCNC: 31.9 G/DL (ref 30–36.5)
MCV RBC AUTO: 97.7 FL (ref 80–99)
MONOCYTES # BLD: 1.3 K/UL (ref 0–1)
MONOCYTES NFR BLD: 19 % (ref 5–13)
NEUTS SEG # BLD: 4.7 K/UL (ref 1.8–8)
NEUTS SEG NFR BLD: 70 % (ref 32–75)
NRBC # BLD: 0 K/UL (ref 0–0.01)
NRBC BLD-RTO: 0 PER 100 WBC
PLATELET # BLD AUTO: 194 K/UL (ref 150–400)
PMV BLD AUTO: 9.2 FL (ref 8.9–12.9)
POTASSIUM SERPL-SCNC: 4.8 MMOL/L (ref 3.5–5.1)
RBC # BLD AUTO: 2.6 M/UL (ref 4.1–5.7)
SODIUM SERPL-SCNC: 137 MMOL/L (ref 136–145)
WBC # BLD AUTO: 6.6 K/UL (ref 4.1–11.1)

## 2024-08-18 PROCEDURE — 80048 BASIC METABOLIC PNL TOTAL CA: CPT

## 2024-08-18 PROCEDURE — 36415 COLL VENOUS BLD VENIPUNCTURE: CPT

## 2024-08-18 PROCEDURE — 2500000003 HC RX 250 WO HCPCS: Performed by: STUDENT IN AN ORGANIZED HEALTH CARE EDUCATION/TRAINING PROGRAM

## 2024-08-18 PROCEDURE — 6370000000 HC RX 637 (ALT 250 FOR IP): Performed by: INTERNAL MEDICINE

## 2024-08-18 PROCEDURE — 97162 PT EVAL MOD COMPLEX 30 MIN: CPT

## 2024-08-18 PROCEDURE — 6370000000 HC RX 637 (ALT 250 FOR IP): Performed by: NURSE PRACTITIONER

## 2024-08-18 PROCEDURE — 2580000003 HC RX 258: Performed by: INTERNAL MEDICINE

## 2024-08-18 PROCEDURE — 2060000000 HC ICU INTERMEDIATE R&B

## 2024-08-18 PROCEDURE — 71045 X-RAY EXAM CHEST 1 VIEW: CPT

## 2024-08-18 PROCEDURE — 85025 COMPLETE CBC W/AUTO DIFF WBC: CPT

## 2024-08-18 PROCEDURE — 2500000003 HC RX 250 WO HCPCS: Performed by: INTERNAL MEDICINE

## 2024-08-18 PROCEDURE — 97530 THERAPEUTIC ACTIVITIES: CPT

## 2024-08-18 PROCEDURE — 6370000000 HC RX 637 (ALT 250 FOR IP): Performed by: STUDENT IN AN ORGANIZED HEALTH CARE EDUCATION/TRAINING PROGRAM

## 2024-08-18 RX ORDER — 0.9 % SODIUM CHLORIDE 0.9 %
500 INTRAVENOUS SOLUTION INTRAVENOUS ONCE
Status: COMPLETED | OUTPATIENT
Start: 2024-08-18 | End: 2024-08-18

## 2024-08-18 RX ORDER — DILTIAZEM HYDROCHLORIDE 5 MG/ML
15 INJECTION INTRAVENOUS ONCE
Status: COMPLETED | OUTPATIENT
Start: 2024-08-18 | End: 2024-08-18

## 2024-08-18 RX ORDER — METOCLOPRAMIDE 10 MG/1
10 TABLET ORAL
Status: DISCONTINUED | OUTPATIENT
Start: 2024-08-18 | End: 2024-08-28 | Stop reason: HOSPADM

## 2024-08-18 RX ADMIN — METOCLOPRAMIDE 10 MG: 10 TABLET ORAL at 12:30

## 2024-08-18 RX ADMIN — SODIUM BICARBONATE 650 MG: 650 TABLET ORAL at 08:03

## 2024-08-18 RX ADMIN — PRIMIDONE 100 MG: 50 TABLET ORAL at 08:03

## 2024-08-18 RX ADMIN — CARVEDILOL 12.5 MG: 12.5 TABLET, FILM COATED ORAL at 08:03

## 2024-08-18 RX ADMIN — FOLIC ACID 1 MG: 1 TABLET ORAL at 08:09

## 2024-08-18 RX ADMIN — PRIMIDONE 100 MG: 50 TABLET ORAL at 21:47

## 2024-08-18 RX ADMIN — CARVEDILOL 12.5 MG: 12.5 TABLET, FILM COATED ORAL at 16:03

## 2024-08-18 RX ADMIN — METOPROLOL TARTRATE 2.5 MG: 5 INJECTION INTRAVENOUS at 18:30

## 2024-08-18 RX ADMIN — MEGESTROL ACETATE 400 MG: 40 SUSPENSION ORAL at 08:05

## 2024-08-18 RX ADMIN — DILTIAZEM HYDROCHLORIDE 15 MG: 5 INJECTION, SOLUTION INTRAVENOUS at 18:46

## 2024-08-18 RX ADMIN — SODIUM CHLORIDE, PRESERVATIVE FREE 10 ML: 5 INJECTION INTRAVENOUS at 08:02

## 2024-08-18 RX ADMIN — DILTIAZEM HYDROCHLORIDE 5 MG/HR: 5 INJECTION, SOLUTION INTRAVENOUS at 18:56

## 2024-08-18 RX ADMIN — CIPROFLOXACIN HYDROCHLORIDE 500 MG: 500 TABLET, FILM COATED ORAL at 08:03

## 2024-08-18 RX ADMIN — SODIUM BICARBONATE 650 MG: 650 TABLET ORAL at 21:47

## 2024-08-18 RX ADMIN — METOCLOPRAMIDE 10 MG: 10 TABLET ORAL at 16:03

## 2024-08-18 RX ADMIN — ATORVASTATIN CALCIUM 20 MG: 20 TABLET, FILM COATED ORAL at 08:03

## 2024-08-18 RX ADMIN — SODIUM CHLORIDE 500 ML: 9 INJECTION, SOLUTION INTRAVENOUS at 08:41

## 2024-08-18 RX ADMIN — METOPROLOL TARTRATE 2.5 MG: 5 INJECTION INTRAVENOUS at 13:19

## 2024-08-18 RX ADMIN — SODIUM CHLORIDE, PRESERVATIVE FREE 10 ML: 5 INJECTION INTRAVENOUS at 21:47

## 2024-08-18 ASSESSMENT — PAIN SCALES - GENERAL: PAINLEVEL_OUTOF10: 0

## 2024-08-18 NOTE — PROGRESS NOTES
After cleaning patient from a bowel movement, patient heart rate increased to 170bpm. Patient already in A-fib. Gave prescribed Metoprolol 2.5mg IVP. Heart rate decreased to 150's. Placed call to [facility] to speak to on call physician. Awaiting call back.

## 2024-08-18 NOTE — PROGRESS NOTES
Doctor Alexandria returned call. Cardizem 15mg bolus and drip at 5mg/hr. Bolus given and drip started. Heart rate decreased to 90bpm.

## 2024-08-18 NOTE — PROGRESS NOTES
CARDIOLOGY PROGRESS NOTE      Patient Name: Abel Cruz  Age: 83 y.o.  Gender:male  :1940  MRN: [id number]    Patient seen and examined. This is a patient with a history of hypertension, hyperlipidemia, tremors, CKD s/p left nephrectomy, prostate cancer, chronic anemia  who presented with pleuritic pain, cough now being followed for SVT.      remains in ICU. No recurrent SVT.  No chest pain or shortness of breath.  Denies palpitations.  No other complaints reported.     out of ICU, comfortable in recliner on tele. No chest pain or dyspnea.     Telemetry reviewed, there were no events noted in the past 24 hours.    Pertinent review of systems items noted above, all other systems are negative. Current medications reviewed.    Physical Examination    Allergies   Allergen Reactions    Naproxen Other (See Comments)     Gastric Ulcer With Hemorrhage.    Lisinopril Nausea And Vomiting    Morphine Rash     Vitals:    24 1456   BP: 126/76   Pulse: (!) 108   Resp: 18   Temp: 98.2 °F (36.8 °C)   SpO2: 100%     Vital signs are stable  No apparent distress, frail, chronically ill appearing  Heart has a mildly tachycardic rate and regular rhythm.  no murmur  Lungs are diminished  Abdomen is soft, nontender, normal bowel sounds.  Extremities have no edema  Skin is dry and warm.  Normal affect    Labs reviewed:  No results found for this or any previous visit (from the past 12 hour(s)).       Impression and recommendations are as follows:  PSVT, no recurrence  Continue telemetry monitoring.   Continue coreg 12.5mg  Will need an ischemic evaluation but would like to get prognosis from pulm/oncology first; can be done as an outpt.   Consider infectious work up with higher heart rates/lower blood pressures  Cardiomyopathy: EF 30-35%, new finding.   Euvolemic.   Will continue BB.   GDMT limited by renal function and solitary kidney.  Hypertension:   blood pressure is labile  Stopped amlodipine to allow for  GDMT, now with low bps will stop hydralazine  Continue to monitor.   Hyperlipidemia: Continue statin therapy  Pulmonary nodules w/ rt sided pleural effusion: s/p thoracentesis, cyto negative for cancer. pulmonology following.   Rt hydronephrosis, NETO: s/p left nephrectomy in the 1980s per pt. Creat elevated, improving. Nephrology and urology on board. Cystoscopy cancelled, pt will fu outpt.  Essential tremors: on propranolol at home (now on hold), primidone  Anemia: chronic. Per primary     Please do not hesitate to call if additional questions arise.    Anibal Washington MD  8/18/2024

## 2024-08-18 NOTE — PROGRESS NOTES
NAME:  Abel Cruz   :   1940   MRN:   [id number]     ATTENDING: Nils Malik Cha, MD  PCP:  Jeremy Grimes MD    Date/Time:  2024       Subjective:     Patient seen on regular medical floor, he was transferred out of ICU yesterday.  He is sitting up in chair alert oriented and having lunch.  Did not have any active complaints.    Past Medical History:   Diagnosis Date    Hypertension       Past Surgical History:   Procedure Laterality Date    KIDNEY REMOVAL      over 20 years ago     Social History     Tobacco Use    Smoking status: Former     Types: Cigarettes    Smokeless tobacco: Not on file   Substance Use Topics    Alcohol use: Not on file      History reviewed. No pertinent family history.    Allergies   Allergen Reactions    Naproxen Other (See Comments)     Gastric Ulcer With Hemorrhage.    Lisinopril Nausea And Vomiting    Morphine Rash      Prior to Admission medications    Medication Sig Start Date End Date Taking? Authorizing Provider   latanoprost (XALATAN) 0.005 % ophthalmic solution Place 1 drop into both eyes nightly   Yes Mitesh Daily MD   Calcium Citrate-Vitamin D (SM CALCIUM CITRATE W/VIT D3 PO) Take 1,000 mg by mouth Daily   Yes Mitesh Daily MD   lidocaine (BLUE-EMU PAIN RELIEF DRY) 4 % external patch Place 1 patch onto the skin daily   Yes Mitesh Daily MD   ferrous sulfate (IRON 325) 325 (65 Fe) MG tablet Take 1 tablet by mouth daily (with breakfast)   Yes Mitesh Daily MD   atorvastatin (LIPITOR) 40 MG tablet Take 0.5 tablets by mouth daily 24  Yes Mitesh Daily MD   cyanocobalamin 500 MCG tablet Take 2 tablets by mouth daily   Yes Mitesh Daily MD   hydrALAZINE (APRESOLINE) 25 MG tablet Take 1 tablet by mouth 3 times daily   Yes Mitesh Daily MD   megestrol (MEGACE) 40 MG/ML suspension Take 10 mLs by mouth daily 24  Yes Mitesh Daily MD   primidone (MYSOLINE) 50 MG tablet Take 150 tablets by mouth 3 times  mg/dL    BUN/Creatinine Ratio 21 (H) 12 - 20      Est, Glom Filt Rate 20 (L) >60 ml/min/1.73m2    Calcium 8.9 8.5 - 10.1 mg/dL     UOP -600 ml   Today's lab results reviewed     Assessment and plan:     1. Acute Kidney Injury on CKD with unilateral kidney  tomeka probably prerenal azotemia secondary to volume depletion/anemia/left hydroureteronephrosis, ?post-obstructive  Creatinine is 4.1 on admission, improving to 2.9>>2.68>>3  today.  Will give a bolus of 500 mL NS.  Baseline is unknown but according to notes he has history of admission CKD 4  Urine is suggestive of UTI.  Quantify proteinuria once UTI clears  No rhabdo  CT abdomen without contrast showed new mild hydroureteronephrosis on the right kidney, s/p left nephrectomy,  Kim placed  Avoid nephrotoxic medications  On isotonic saline KVO  Will continue to monitor renal functions and adjust management as needed    2. Hypertension: Controlled  On amlodipine 10 mg, hydralazine 25 mg TID, and propranolol 80 mg  Continue current antihypertensive regimen   Will continue to monitor blood pressures and adjust antihypertensive regimen as needed.    3. Hyperkalemia,   mild: probably secondary to renal failure  Resolved with medical management  Will monitor follow-up potassium levels closely    4. Metabolic acidosis: Secondary to acute kidney injury  CO2 level is 18, improved to 22 today  Off bicarbonate drip  On oral bicarbonate 1300 twice daily which I will decrease to 650 p.o. bid  Will continue to monitor CO2 levels    5. Anemia: Probably related to chronic disease, ?Lower GI bleed  Hb 8.4, continue Procrit   He has functional iron deficiency.  Will start oral iron   continue to monitor H&H    6.  Right flank pain  CT chest showed innumerable tiny miliary nodules throughout both lungs, moderately large right pleural effusion and a 7 mm nodule within the right adrenal gland  Patient underwent right thoracentesis 8/13, 1.5 L drained, it is exudative with protein 5.5

## 2024-08-18 NOTE — PROGRESS NOTES
Pulmonary/ CC progress note    Subjective:       Patient seen and examined  Overnight events noted    Transferred out of ICU yesterday  Awake and alert  Sitting in the recliner  No acute distress  On room air    Patient Active Problem List   Diagnosis    Pleural effusion    Hydronephrosis     Past Medical History:   Diagnosis Date    Hypertension       History reviewed. No pertinent family history.   Social History     Tobacco Use    Smoking status: Former     Types: Cigarettes    Smokeless tobacco: Not on file   Substance Use Topics    Alcohol use: Not on file     Past Surgical History:   Procedure Laterality Date    KIDNEY REMOVAL      over 20 years ago      Prior to Admission medications    Medication Sig Start Date End Date Taking? Authorizing Provider   latanoprost (XALATAN) 0.005 % ophthalmic solution Place 1 drop into both eyes nightly   Yes Mitesh Daily MD   Calcium Citrate-Vitamin D (SM CALCIUM CITRATE W/VIT D3 PO) Take 1,000 mg by mouth Daily   Yes Mitesh Daily MD   lidocaine (BLUE-EMU PAIN RELIEF DRY) 4 % external patch Place 1 patch onto the skin daily   Yes Mitesh Daily MD   ferrous sulfate (IRON 325) 325 (65 Fe) MG tablet Take 1 tablet by mouth daily (with breakfast)   Yes Mitesh Daily MD   atorvastatin (LIPITOR) 40 MG tablet Take 0.5 tablets by mouth daily 7/31/24  Yes Mitesh Daily MD   cyanocobalamin 500 MCG tablet Take 2 tablets by mouth daily   Yes Mitesh Daily MD   hydrALAZINE (APRESOLINE) 25 MG tablet Take 1 tablet by mouth 3 times daily   Yes Mitesh Daily MD   megestrol (MEGACE) 40 MG/ML suspension Take 10 mLs by mouth daily 7/29/24  Yes Mitesh Daily MD   primidone (MYSOLINE) 50 MG tablet Take 150 tablets by mouth 3 times daily 1/14/22  Yes Mitesh Daily MD   propranolol (INNOPRAN XL) 80 MG extended release capsule Take 1 capsule by mouth daily 4/30/24  Yes Mitesh Daily MD   amLODIPine (NORVASC) 10 MG tablet  gradually improving.  Nephrology consult and input was appreciated.  With hydration renal functions are gradually improving.    5.  SVT  Developed SVT.  Given IV Cardizem bolus and patient got started on p.o. metoprolol  Will need cardiac stress testing and workup.    6.  Right hydronephrosis  Patient is status post left nephrectomy in the past.  Will get cystoscopy once cleared by cardiology.    Questions of patient were answered at bedside in detail  Case discussed in detail with RN, RT, and care team  Thank you for involving me in the care of this patient  I will follow with you closely during hospitalization    Time spent more than 30 minutes under patient care with no overlap reviewing results and records, decision making, and answering questions.    Lew Daugherty MD  [facility] ([facility])     This dictation was done by dragon, computer voice recognition software.  Often unanticipated grammatical, syntax, [place] phones and other interpretive errors are inadvertently transcribed.  Please excuse errors that have escaped final proofreading.

## 2024-08-18 NOTE — PROGRESS NOTES
Hospitalist Progress Note    NAME:   Abel Cruz   : 1940   MRN: [id number]     Date/Time: 2024 10:51 AM  Patient PCP: Jeremy Grimes MD    Estimated discharge date: 24-48 hours  Barriers: Stabilize tachycardia, cardiology clearance, void trial closer to discharge    Hospital course:  Patient is an 83-year-old male with a past medical history of hypertension, hyperlipidemia, tremors, CKD s/p left nephrectomy, prostate cancer and anemia of chronic disease who was admitted on 2024 with an abnormal CT of the chest concerning for malignancy.  CXR revealed right pleural effusion, at least moderate size.  Associated asymptomatic right lung pulmonary edema versus opacities.  CT of the chest from 2024 revealed innumerable tiny miliary nodules throughout both lungs with large pulmonary nodule located within the right lower lobe measuring approximately 6 mm in diameter.  None of pulmonary nodules are amenable to image guided percutaneous biopsy.  Moderately large right pleural effusion with associated right lower lobe atelectasis.  Postoperative changes from remote left nephrectomy.  Pulmonology consulted for right pleural effusion and oncology for concerns of malignancy.  Also, consult urology for right hydronephrosis with history of left nephrectomy and nephrology for evaluation of severe metabolic acidosis with history of CKD.  Nephrology about a patient and recommended providing patient with 1 amp of sodium bicarb now with addition of sodium bicarb tablets 13 mg twice daily and sodium bicarb drip and D5 at 75 cc/h.  Also, recommended placing Kim catheter until urology can evaluate for hydroureteronephrosis of the right kidney.  Urology evaluate patient and planning for cystoscopy with retrograde pyelogram and possible stent placement of the right ureter and kidney on 2024.  Pulmonology about a patient recommended right sided thoracentesis, which was complete on 2024 by IR  current labs were reviewed and interpreted for clinical significance   [x] Appropriate follow-up labs were ordered  [] Collateral history obtained from:       Code Status: DNR  DVT Prophylaxis: Hold due to anemia  GI Prophylaxis: Not indicated    Subjective:     Chief Complaint / Reason for Physician Visit  Patient evaluated at bedside this morning resting comfortably.  Patient reports continued to experience hiccups without any relief.  Will initiate patient on Reglan for potential assistance with symptom.  Also, patient now in A-fib with controlled rate so will notify cardiology for further recommendations.  No other complaints at this time.  Discussed with RN events overnight.     Objective:     VITALS:   Last 24hrs VS reviewed since prior progress note. Most recent are:  Patient Vitals for the past 24 hrs:   BP Temp Temp src Pulse Resp SpO2   08/18/24 0908 (!) 129/100 98.4 °F (36.9 °C) Oral (!) 113 20 92 %   08/18/24 0345 (!) 145/92 98.2 °F (36.8 °C) Oral 99 20 96 %   08/18/24 0200 (!) 128/91 -- -- (!) 105 24 97 %   08/18/24 0100 (!) 141/93 -- -- (!) 102 24 97 %   08/18/24 0000 (!) 126/91 -- -- (!) 104 21 98 %   08/17/24 2300 131/85 98.7 °F (37.1 °C) Oral (!) 106 20 98 %   08/17/24 2200 132/80 -- -- 97 21 99 %   08/17/24 2100 130/86 -- -- 98 21 98 %   08/17/24 2012 -- 98.8 °F (37.1 °C) Oral -- -- --   08/17/24 2000 108/80 -- -- 99 18 98 %   08/17/24 1900 110/68 -- -- (!) 101 18 98 %   08/17/24 1700 132/86 -- -- (!) 116 23 97 %   08/17/24 1600 106/83 -- -- (!) 108 17 95 %   08/17/24 1500 122/78 97.9 °F (36.6 °C) Oral (!) 107 16 94 %   08/17/24 1400 116/80 -- -- (!) 106 16 97 %   08/17/24 1300 121/81 -- -- (!) 124 19 96 %   08/17/24 1200 123/86 -- -- (!) 108 18 99 %   08/17/24 1100 127/83 98.4 °F (36.9 °C) Oral (!) 103 15 99 %         Intake/Output Summary (Last 24 hours) at 8/18/2024 1051  Last data filed at 8/18/2024 0909  Gross per 24 hour   Intake 600 ml   Output 600 ml   Net 0 ml        I had a face to face

## 2024-08-18 NOTE — PROGRESS NOTES
4 Eyes Skin Assessment     NAME:  Abel Cruz  YOB: 1940  MEDICAL RECORD NUMBER:  [id number]    The patient is being assessed for  Transfer to New Unit    I agree that at least one RN has performed a thorough Head to Toe Skin Assessment on the patient. ALL assessment sites listed below have been assessed.      Areas assessed by both nurses:    Head, Face, Ears, Shoulders, Back, Chest, Arms, Elbows, Hands, Sacrum. Buttock, Coccyx, Ischium, and Legs. Feet and Heels        Does the Patient have a Wound? No noted wound(s)       Raymon Prevention initiated by RN: No  Wound Care Orders initiated by RN: No    Pressure Injury (Stage 3,4, Unstageable, DTI, NWPT, and Complex wounds) if present, place Wound referral order by RN under : No    New Ostomies, if present place, Ostomy referral order under : No     Nurse 1 eSignature: Electronically signed by Zainab Rogers RN on 8/18/24 at 8:11 AM EDT    **SHARE this note so that the co-signing nurse can place an eSignature**    Nurse 2 eSignature: Electronically signed by Kalpesh Luther RN on 8/18/24 at 8:25 AM EDT

## 2024-08-18 NOTE — PLAN OF CARE
PHYSICAL THERAPY EVALUATION  Patient: Abel Cruz (83 y.o. male)  Date: 8/18/2024  Primary Diagnosis: Hyperkalemia [E87.5]  Pleural effusion [J90]  NETO (acute kidney injury) (HCC) [N17.9]  Procedure(s) (LRB):  CYSTOSCOPY, RETROGRADE PYELOGRAM, URETERAL STENT INSERTION (Right) 4 Days Post-Op   Precautions:       Recommendations for nursing mobility: Out of bed to chair for meals, Frequent repositioning to prevent skin breakdown, AD and gt belt for bed to chair , and Assist x1    In place during session: Peripheral IV, Kim Catheter, and EKG/telemetry     ASSESSMENT  Pt is a 83 y.o. male admitted on 8/11/2024 for worsening R flank pain; pt currently being treated for tachycardia, abnormal chest CT concerning for malignancy, right pleural effusion, UTI, R sided hydronephrosis and new diagnosis of EF 30-35%. Pt awake in bed upon PT arrival, agreeable to evaluation. Pt A&O x 3, able to report that he is in a hospital but unsure of which hospital.       Based on the objective data described below, the patient currently presents with impaired functional mobility, decreased independence in ADLs, impaired ability to perform high-level IADLs, impaired strength, decreased activity tolerance, impaired cognition, impaired balance, and impaired vision/visual deficit. (See below for objective details and assist levels).     Overall pt tolerated session fair today with some complaints of fatigue, HR elevated to 110s-120s with transfer but returned to <100 after seated rest. Pt also reporting significant discomfort from persistent hiccups. Pt required mod A for bed mobility and max A for transfers. Patient was unable to ambulate. Pt will benefit from continued skilled PT to address above deficits and return to PLOF. Potential barriers for safe discharge: pt lives alone, pts current support system is unable to meet their requirements for physical assistance, pt is a high fall risk, pt is not safe to be alone, and concern for pt  provided:   bed mobility , EOB transfers, OOB transfers, amb with AD, LE therapeutic exercises, and standing static balance    Functional Measure:  [facility] AM-PAC™ “6 Clicks”         Basic Mobility Inpatient Short Form  How much difficulty does the patient currently have... Unable A Lot A Little None   1.  Turning over in bed (including adjusting bedclothes, sheets and blankets)?   [] 1   [] 2   [x] 3   [] 4   2.  Sitting down on and standing up from a chair with arms ( e.g., wheelchair, bedside commode, etc.)   [] 1   [x] 2   [] 3   [] 4   3.  Moving from lying on back to sitting on the side of the bed?   [] 1   [x] 2   [] 3   [] 4          How much help from another person does the patient currently need... Total A Lot A Little None   4.  Moving to and from a bed to a chair (including a wheelchair)?   [] 1   [x] 2   [] 3   [] 4   5.  Need to walk in hospital room?   [] 1   [x] 2   [] 3   [] 4   6.  Climbing 3-5 steps with a railing?   [x] 1   [] 2   [] 3   [] 4   © , Trustees of [facility], under license to [employer]. All rights reserved     Score:  Initial:  Most Recent: (Date: 2024 )   Interpretation of Tool:  Represents activities that are increasingly more difficult (i.e. Bed mobility, Transfers, Gait).  Score 24 23 22-20 19-15 14-10 9-7 6   Modifier CH CI CJ CK CL CM CN         Physical Therapy Evaluation Charge Determination   History Examination Presentation Decision-Making   HIGH Complexity :3+ comorbidities / personal factors will impact the outcome/ POC  MEDIUM Complexity : 3 Standardized tests and measures addressing body structure, function, activity limitation and / or participation in recreation  MEDIUM Complexity : Evolving with changing characteristics  Other outcome measures [facility] 6  MEDIUM      Based on the above components, the patient evaluation is determined to be of the following complexity level: MEDIUM    Pain Ratin/10   Pain Intervention(s):   pain is

## 2024-08-19 ENCOUNTER — APPOINTMENT (OUTPATIENT)
Facility: HOSPITAL | Age: 84
DRG: 204 | End: 2024-08-19
Payer: OTHER GOVERNMENT

## 2024-08-19 LAB
ALBUMIN SERPL-MCNC: 1.9 G/DL (ref 3.5–5)
ALBUMIN/GLOB SERPL: 0.4 (ref 1.1–2.2)
ALP SERPL-CCNC: 92 U/L (ref 45–117)
ALT SERPL-CCNC: 36 U/L (ref 12–78)
ANION GAP SERPL CALC-SCNC: 9 MMOL/L (ref 5–15)
AST SERPL W P-5'-P-CCNC: 53 U/L (ref 15–37)
BASOPHILS # BLD: 0 K/UL (ref 0–0.1)
BASOPHILS NFR BLD: 0 % (ref 0–1)
BILIRUB SERPL-MCNC: 0.2 MG/DL (ref 0.2–1)
BUN SERPL-MCNC: 56 MG/DL (ref 6–20)
BUN/CREAT SERPL: 20 (ref 12–20)
CA-I BLD-MCNC: 9.1 MG/DL (ref 8.5–10.1)
CHLORIDE SERPL-SCNC: 110 MMOL/L (ref 97–108)
CO2 SERPL-SCNC: 21 MMOL/L (ref 21–32)
CREAT SERPL-MCNC: 2.79 MG/DL (ref 0.7–1.3)
DIFFERENTIAL METHOD BLD: ABNORMAL
EOSINOPHIL # BLD: 0.2 K/UL (ref 0–0.4)
EOSINOPHIL NFR BLD: 2 % (ref 0–7)
ERYTHROCYTE [DISTWIDTH] IN BLOOD BY AUTOMATED COUNT: 17.1 % (ref 11.5–14.5)
GLOBULIN SER CALC-MCNC: 4.3 G/DL (ref 2–4)
GLUCOSE SERPL-MCNC: 111 MG/DL (ref 65–100)
HCT VFR BLD AUTO: 23.9 % (ref 36.6–50.3)
HGB BLD-MCNC: 7.8 G/DL (ref 12.1–17)
IMM GRANULOCYTES # BLD AUTO: 0 K/UL (ref 0–0.04)
IMM GRANULOCYTES NFR BLD AUTO: 1 % (ref 0–0.5)
LYMPHOCYTES # BLD: 0.4 K/UL (ref 0.8–3.5)
LYMPHOCYTES NFR BLD: 5 % (ref 12–49)
M PNEUMO IGM SER IA-ACNC: NONREACTIVE
MCH RBC QN AUTO: 31.6 PG (ref 26–34)
MCHC RBC AUTO-ENTMCNC: 32.6 G/DL (ref 30–36.5)
MCV RBC AUTO: 96.8 FL (ref 80–99)
MONOCYTES # BLD: 1.5 K/UL (ref 0–1)
MONOCYTES NFR BLD: 19 % (ref 5–13)
NEUTS SEG # BLD: 5.9 K/UL (ref 1.8–8)
NEUTS SEG NFR BLD: 73 % (ref 32–75)
NRBC # BLD: 0 K/UL (ref 0–0.01)
NRBC BLD-RTO: 0 PER 100 WBC
PLATELET # BLD AUTO: 210 K/UL (ref 150–400)
PMV BLD AUTO: 9.8 FL (ref 8.9–12.9)
POTASSIUM SERPL-SCNC: 4.8 MMOL/L (ref 3.5–5.1)
PROT SERPL-MCNC: 6.2 G/DL (ref 6.4–8.2)
RBC # BLD AUTO: 2.47 M/UL (ref 4.1–5.7)
SODIUM SERPL-SCNC: 140 MMOL/L (ref 136–145)
TROPONIN I SERPL HS-MCNC: 28 NG/L (ref 0–76)
WBC # BLD AUTO: 8 K/UL (ref 4.1–11.1)

## 2024-08-19 PROCEDURE — 80053 COMPREHEN METABOLIC PANEL: CPT

## 2024-08-19 PROCEDURE — 6360000002 HC RX W HCPCS: Performed by: INTERNAL MEDICINE

## 2024-08-19 PROCEDURE — 2580000003 HC RX 258: Performed by: INTERNAL MEDICINE

## 2024-08-19 PROCEDURE — 36415 COLL VENOUS BLD VENIPUNCTURE: CPT

## 2024-08-19 PROCEDURE — 86738 MYCOPLASMA ANTIBODY: CPT

## 2024-08-19 PROCEDURE — 6370000000 HC RX 637 (ALT 250 FOR IP): Performed by: INTERNAL MEDICINE

## 2024-08-19 PROCEDURE — 85025 COMPLETE CBC W/AUTO DIFF WBC: CPT

## 2024-08-19 PROCEDURE — 6370000000 HC RX 637 (ALT 250 FOR IP): Performed by: STUDENT IN AN ORGANIZED HEALTH CARE EDUCATION/TRAINING PROGRAM

## 2024-08-19 PROCEDURE — 2500000003 HC RX 250 WO HCPCS

## 2024-08-19 PROCEDURE — 71250 CT THORAX DX C-: CPT

## 2024-08-19 PROCEDURE — 2500000003 HC RX 250 WO HCPCS: Performed by: INTERNAL MEDICINE

## 2024-08-19 PROCEDURE — 87449 NOS EACH ORGANISM AG IA: CPT

## 2024-08-19 PROCEDURE — 6370000000 HC RX 637 (ALT 250 FOR IP)

## 2024-08-19 PROCEDURE — 2500000003 HC RX 250 WO HCPCS: Performed by: PHYSICIAN ASSISTANT

## 2024-08-19 PROCEDURE — 87305 ASPERGILLUS AG IA: CPT

## 2024-08-19 PROCEDURE — 71045 X-RAY EXAM CHEST 1 VIEW: CPT

## 2024-08-19 PROCEDURE — 2060000000 HC ICU INTERMEDIATE R&B

## 2024-08-19 PROCEDURE — 6370000000 HC RX 637 (ALT 250 FOR IP): Performed by: NURSE PRACTITIONER

## 2024-08-19 PROCEDURE — 6360000002 HC RX W HCPCS

## 2024-08-19 PROCEDURE — 84484 ASSAY OF TROPONIN QUANT: CPT

## 2024-08-19 RX ORDER — METOPROLOL TARTRATE 1 MG/ML
2.5 INJECTION, SOLUTION INTRAVENOUS
Status: DISCONTINUED | OUTPATIENT
Start: 2024-08-19 | End: 2024-08-28 | Stop reason: HOSPADM

## 2024-08-19 RX ORDER — LORAZEPAM 1 MG/1
1 TABLET ORAL EVERY 6 HOURS PRN
Status: DISCONTINUED | OUTPATIENT
Start: 2024-08-19 | End: 2024-08-28 | Stop reason: HOSPADM

## 2024-08-19 RX ORDER — HEPARIN SODIUM 5000 [USP'U]/ML
5000 INJECTION, SOLUTION INTRAVENOUS; SUBCUTANEOUS EVERY 8 HOURS SCHEDULED
Status: DISCONTINUED | OUTPATIENT
Start: 2024-08-19 | End: 2024-08-28 | Stop reason: HOSPADM

## 2024-08-19 RX ORDER — METOPROLOL TARTRATE 1 MG/ML
INJECTION, SOLUTION INTRAVENOUS
Status: COMPLETED
Start: 2024-08-19 | End: 2024-08-19

## 2024-08-19 RX ORDER — CARVEDILOL 12.5 MG/1
25 TABLET ORAL 2 TIMES DAILY WITH MEALS
Status: DISCONTINUED | OUTPATIENT
Start: 2024-08-19 | End: 2024-08-28 | Stop reason: HOSPADM

## 2024-08-19 RX ORDER — FUROSEMIDE 10 MG/ML
40 INJECTION INTRAMUSCULAR; INTRAVENOUS ONCE
Status: COMPLETED | OUTPATIENT
Start: 2024-08-19 | End: 2024-08-19

## 2024-08-19 RX ADMIN — LORAZEPAM 1 MG: 1 TABLET ORAL at 14:11

## 2024-08-19 RX ADMIN — METOPROLOL TARTRATE 2.5 MG: 5 INJECTION INTRAVENOUS at 18:06

## 2024-08-19 RX ADMIN — MEGESTROL ACETATE 400 MG: 40 SUSPENSION ORAL at 08:28

## 2024-08-19 RX ADMIN — PRIMIDONE 100 MG: 50 TABLET ORAL at 20:48

## 2024-08-19 RX ADMIN — SODIUM BICARBONATE 650 MG: 650 TABLET ORAL at 08:27

## 2024-08-19 RX ADMIN — FOLIC ACID 1 MG: 1 TABLET ORAL at 08:27

## 2024-08-19 RX ADMIN — ATORVASTATIN CALCIUM 20 MG: 20 TABLET, FILM COATED ORAL at 08:27

## 2024-08-19 RX ADMIN — METOCLOPRAMIDE 10 MG: 10 TABLET ORAL at 16:40

## 2024-08-19 RX ADMIN — HEPARIN SODIUM 5000 UNITS: 5000 INJECTION INTRAVENOUS; SUBCUTANEOUS at 14:48

## 2024-08-19 RX ADMIN — CARVEDILOL 12.5 MG: 12.5 TABLET, FILM COATED ORAL at 08:27

## 2024-08-19 RX ADMIN — SODIUM CHLORIDE, PRESERVATIVE FREE 10 ML: 5 INJECTION INTRAVENOUS at 08:28

## 2024-08-19 RX ADMIN — CARVEDILOL 25 MG: 12.5 TABLET, FILM COATED ORAL at 16:40

## 2024-08-19 RX ADMIN — DILTIAZEM HYDROCHLORIDE 5 MG/HR: 5 INJECTION, SOLUTION INTRAVENOUS at 09:12

## 2024-08-19 RX ADMIN — METOCLOPRAMIDE 10 MG: 10 TABLET ORAL at 06:47

## 2024-08-19 RX ADMIN — METOPROLOL TARTRATE 2.5 MG: 5 INJECTION INTRAVENOUS at 09:28

## 2024-08-19 RX ADMIN — HEPARIN SODIUM 5000 UNITS: 5000 INJECTION INTRAVENOUS; SUBCUTANEOUS at 21:13

## 2024-08-19 RX ADMIN — SODIUM CHLORIDE, PRESERVATIVE FREE 10 ML: 5 INJECTION INTRAVENOUS at 21:13

## 2024-08-19 RX ADMIN — METOCLOPRAMIDE 10 MG: 10 TABLET ORAL at 10:54

## 2024-08-19 RX ADMIN — CIPROFLOXACIN HYDROCHLORIDE 500 MG: 500 TABLET, FILM COATED ORAL at 08:28

## 2024-08-19 RX ADMIN — SODIUM BICARBONATE 650 MG: 650 TABLET ORAL at 20:48

## 2024-08-19 RX ADMIN — PRIMIDONE 100 MG: 50 TABLET ORAL at 08:27

## 2024-08-19 RX ADMIN — FUROSEMIDE 40 MG: 10 INJECTION, SOLUTION INTRAMUSCULAR; INTRAVENOUS at 14:28

## 2024-08-19 NOTE — PROGRESS NOTES
Hematology/Oncology   Progress Note    Patient: Abel Cruz MRN: [id number]     YOB: 1940  Age: 83 y.o.  Sex: male      Admit Date: 8/11/2024    LOS: 8 days     Reason for consult: anemia and rule out malignancy     Subjective:     Patient resting in bed, started on Cardizem drip last night for persistent SVT. Pleural fluid cytology negative, needs PET scan as OP with his Oncology team at [facility]    Spoke with patient, his daughter, and his significant other and emphasized follow up with [facility] Hem/Onc once discharged     Review of Systems:   Constitutional No fevers, chills, night sweats   Hematologic/Lymphatic No easy bruising or bleeding.     Respiratory No cough or hemoptysis.   Cardiovascular No anginal chest pain   Gastrointestinal No nausea, vomiting, diarrhea, constipation   Genitourinary (M) No hematuria, dysuria   Musculoskeletal No joint pain, swelling or redness.   Integumentary No skin changes.   Neurologic No headache, blurred vision     Current Facility-Administered Medications:     metoprolol (LOPRESSOR) injection 2.5 mg, 2.5 mg, IntraVENous, Q1H PRN, Ambrosio Burns PA-C, 2.5 mg at 08/19/24 0928    metoclopramide (REGLAN) tablet 10 mg, 10 mg, Oral, TID AC, Claus Yanes PA-C, 10 mg at 08/19/24 1054    melatonin tablet 5 mg, 5 mg, Oral, Nightly PRN, Nikita Shankar MD    dilTIAZem 125 mg in sodium chloride 0.9 % 125 mL infusion ([id number]), 2.5-15 mg/hr, IntraVENous, Continuous, Nikita Shankar MD, Last Rate: 7.5 mL/hr at 08/19/24 1049, 7.5 mg/hr at 08/19/24 1049    carvedilol (COREG) tablet 12.5 mg, 12.5 mg, Oral, BID CORAL, Yasmeen Venegas APRN - NP, 12.5 mg at 08/19/24 0827    ciprofloxacin (CIPRO) tablet 500 mg, 500 mg, Oral, Daily, Calus Yanes PA-C, 500 mg at 08/19/24 0828    sodium bicarbonate tablet 650 mg, 650 mg, Oral, BID, Therese Merritt MD, 650 mg at 08/19/24 0827    epoetin paul-epbx (RETACRIT) injection 10,000 Units, 10,000 Units, SubCUTAneous, Weekly,  sounds. No guarding or rebound tenderness.    Musculoskeletal No tenderness or swelling   Extremities No visible deformities or edema.    Skin No rashes or lesions suggestive of malignancy. No petechiae, purpura.   Neurologic Alert and oriented. Coherent speech. Verbalizes understanding of our discussions today.     Lab/Data Review:  Recent Labs     08/18/24  0254 08/19/24  0123   WBC 6.6 8.0   HGB 8.1* 7.8*   HCT 25.4* 23.9*    210     Recent Labs     08/18/24 0254 08/19/24  0123    140   K 4.8 4.8    110*   CO2 22 21   BUN 63* 56*   ALT  --  36     No results for input(s): \"PH\", \"PCO2\", \"PO2\", \"HCO3\", \"FIO2\" in the last 72 hours.  Recent Results (from the past 24 hour(s))   CBC with Auto Differential    Collection Time: 08/19/24  1:23 AM   Result Value Ref Range    WBC 8.0 4.1 - 11.1 K/uL    RBC 2.47 (L) 4.10 - 5.70 M/uL    Hemoglobin 7.8 (L) 12.1 - 17.0 g/dL    Hematocrit 23.9 (L) 36.6 - 50.3 %    MCV 96.8 80.0 - 99.0 FL    MCH 31.6 26.0 - 34.0 PG    MCHC 32.6 30.0 - 36.5 g/dL    RDW 17.1 (H) 11.5 - 14.5 %    Platelets 210 150 - 400 K/uL    MPV 9.8 8.9 - 12.9 FL    Nucleated RBCs 0.0 0.0  WBC    nRBC 0.00 0.00 - 0.01 K/uL    Neutrophils % 73 32 - 75 %    Lymphocytes % 5 (L) 12 - 49 %    Monocytes % 19 (H) 5 - 13 %    Eosinophils % 2 0 - 7 %    Basophils % 0 0 - 1 %    Immature Granulocytes % 1 (H) 0 - 0.5 %    Neutrophils Absolute 5.9 1.8 - 8.0 K/UL    Lymphocytes Absolute 0.4 (L) 0.8 - 3.5 K/UL    Monocytes Absolute 1.5 (H) 0.0 - 1.0 K/UL    Eosinophils Absolute 0.2 0.0 - 0.4 K/UL    Basophils Absolute 0.0 0.0 - 0.1 K/UL    Immature Granulocytes Absolute 0.0 0.00 - 0.04 K/UL    Differential Type AUTOMATED     Comprehensive Metabolic Panel    Collection Time: 08/19/24  1:23 AM   Result Value Ref Range    Sodium 140 136 - 145 mmol/L    Potassium 4.8 3.5 - 5.1 mmol/L    Chloride 110 (H) 97 - 108 mmol/L    CO2 21 21 - 32 mmol/L    Anion Gap 9 5 - 15 mmol/L    Glucose 111 (H) 65 - 100 mg/dL

## 2024-08-19 NOTE — PROGRESS NOTES
CARDIOLOGY PROGRESS NOTE      Patient Name: Abel Cruz  Age: 83 y.o.  Gender:male  :1940  MRN: [id number]    Patient seen and examined. This is a patient with a history of hypertension, hyperlipidemia, tremors, CKD s/p left nephrectomy, prostate cancer, chronic anemia  who presented with pleuritic pain, cough now being followed for SVT.      remains in ICU. No recurrent SVT.  No chest pain or shortness of breath.  Denies palpitations.  No other complaints reported.     out of ICU, comfortable in recliner on tele. No chest pain or dyspnea.      sitting upright in bed. Family at bedside. Complains of anxiety. No chest pain or dyspnea.     Telemetry reviewed, there were no events noted in the past 24 hours.    Pertinent review of systems items noted above, all other systems are negative. Current medications reviewed.    Physical Examination    Allergies   Allergen Reactions    Naproxen Other (See Comments)     Gastric Ulcer With Hemorrhage.    Lisinopril Nausea And Vomiting    Morphine Rash     Vitals:    24 1130   BP: 107/73   Pulse: 96   Resp:    Temp: 98.4 °F (36.9 °C)   SpO2: 100%     Vital signs are stable  No apparent distress, frail, chronically ill appearing  Heart has a mildly tachycardic rate and regular rhythm.  no murmur  Lungs are diminished  Abdomen is soft, nontender, normal bowel sounds.  Extremities have no edema  Skin is dry and warm.  Normal affect    Labs reviewed:  No results found for this or any previous visit (from the past 12 hour(s)).     Case discussed with Dr. Torrez and our impression and recommendations are as follows:  PSVT, no recurrence  Continue telemetry monitoring.   Increase Coreg dose to 25mg   Will need an ischemic evaluation but would like to get prognosis from pulm/oncology first; can be done as an outpt.   Consider infectious work up with higher heart rates/lower blood pressures  Cardiomyopathy: EF 30-35%, new finding.   Dyspneic, will give 40mg  Lasix now  Repeat CT chest without contrast now   Will continue BB   GDMT limited by renal function and solitary kidney.  Would avoid diltiazem, will stop gtt   Hypertension:   blood pressure is labile  Stopped amlodipine to allow for GDMT, now with low bps will stop hydralazine  Continue to monitor.   Hyperlipidemia: Continue statin therapy  Pulmonary nodules w/ rt sided pleural effusion: s/p thoracentesis, cyto negative for cancer. pulmonology following.   Rt hydronephrosis, NETO: s/p left nephrectomy in the 1980s per pt. Creat elevated, improving. Nephrology and urology on board. Cystoscopy cancelled, pt will fu outpt.  Essential tremors: on propranolol at home (now on hold), primidone  Anemia: chronic. Per primary     Please do not hesitate to call if additional questions arise.    ABNER TAYLOR, APRN - NP  8/19/2024

## 2024-08-19 NOTE — PROGRESS NOTES
Pulmonary Progress Note    Subjective:     Patient seen and examined in his room on the floor this afternoon, no acute events overnight.  Saturating well on 2 L nasal cannula, chronically ill-appearing but reports his breathing is somewhat better, especially after thoracentesis last week.  Known EF of 30 to 35%, also went into A-fib with RVR early this morning and received 15 mg IV Cardizem push.  Currently rate controlled, cardiology evaluation pending, Coreg increased to 25 mg p.o. twice daily.  Prior cytology/culture on pleural fluid negative.  Chest x-ray showing persistent right-sided pleural effusion and improving left basilar aeration.  Will ask IR to repeat diagnostic/therapeutic thoracentesis on the right side, hopefully will be able to drain as much fluid as possible and will be able to repeat cytology to evaluate for malignant cells.  Once repeat thoracentesis has been completed, I will consider a repeat CT scan of the chest, although this will be without IV contrast given renal function.  Creatinine is down to 2.79, however, from 3.05 yesterday.  Appreciate assistance from nephrology team.  PT/OT recommending inpatient rehab.          Patient Active Problem List   Diagnosis    Pleural effusion    Hydronephrosis        Allergies   Allergen Reactions    Naproxen Other (See Comments)     Gastric Ulcer With Hemorrhage.    Lisinopril Nausea And Vomiting    Morphine Rash        Review of Systems:  A comprehensive review of systems was negative except for that written in the History of Present Illness.    Labs:    Recent Labs     08/18/24  0254 08/19/24  0123   WBC 6.6 8.0   HGB 8.1* 7.8*   HCT 25.4* 23.9*    210     Recent Labs     08/18/24  0254 08/19/24  0123    140   K 4.8 4.8    110*   CO2 22 21   GLUCOSE 114* 111*   BUN 63* 56*   CREATININE 3.05* 2.79*   CALCIUM 8.9 9.1   BILITOT  --  0.2   AST  --  53*   ALT  --  36           Objective:   Blood pressure (!) 151/103, pulse 96,

## 2024-08-19 NOTE — PROGRESS NOTES
CM in to speak with patient about DCP. Patient lives at home alone and recs from therapy are for IRF. Patient in agreement and choice letter signed for [facility]. All clinicals faxed to [facility] and CM called to speak to Ny, sent to voicemail and message left. Referral sent to [facility].

## 2024-08-19 NOTE — PLAN OF CARE
Problem: Discharge Planning  Goal: Discharge to home or other facility with appropriate resources  Outcome: Progressing  Flowsheets (Taken 8/18/2024 2000)  Discharge to home or other facility with appropriate resources: Identify barriers to discharge with patient and caregiver     Problem: Metabolic/Fluid and Electrolytes - Adult  Goal: Electrolytes maintained within normal limits  Recent Flowsheet Documentation  Taken 8/18/2024 2000 by Mike Nye RN  Electrolytes maintained within normal limits: Monitor labs and assess patient for signs and symptoms of electrolyte imbalances  Goal: Hemodynamic stability and optimal renal function maintained  Recent Flowsheet Documentation  Taken 8/18/2024 2000 by Mike Nye RN  Hemodynamic stability and optimal renal function maintained: Monitor labs and assess for signs and symptoms of volume excess or deficit  Goal: Glucose maintained within prescribed range  Recent Flowsheet Documentation  Taken 8/18/2024 2000 by Mike Nye RN  Glucose maintained within prescribed range: Monitor blood glucose as ordered     Problem: Metabolic/Fluid and Electrolytes - Adult  Goal: Hemodynamic stability and optimal renal function maintained  Recent Flowsheet Documentation  Taken 8/18/2024 2000 by Mike Nye RN  Hemodynamic stability and optimal renal function maintained: Monitor labs and assess for signs and symptoms of volume excess or deficit     Problem: Metabolic/Fluid and Electrolytes - Adult  Goal: Glucose maintained within prescribed range  Recent Flowsheet Documentation  Taken 8/18/2024 2000 by Mike Nye RN  Glucose maintained within prescribed range: Monitor blood glucose as ordered

## 2024-08-19 NOTE — PROGRESS NOTES
NAME:  Abel Cruz   :   1940   MRN:   [id number]     ATTENDING: Nils Malik Cha, MD  PCP:  Jeremy Grimes MD    Date/Time:  2024       Subjective:     Patient was seen at bedside, awake, alert, reporting right-sided flank pain, shortness of breath, and anxiety when attempting to move.    Past Medical History:   Diagnosis Date    Hypertension       Past Surgical History:   Procedure Laterality Date    KIDNEY REMOVAL      over 20 years ago     Social History     Tobacco Use    Smoking status: Former     Types: Cigarettes    Smokeless tobacco: Not on file   Substance Use Topics    Alcohol use: Not on file      History reviewed. No pertinent family history.    Allergies   Allergen Reactions    Naproxen Other (See Comments)     Gastric Ulcer With Hemorrhage.    Lisinopril Nausea And Vomiting    Morphine Rash      Prior to Admission medications    Medication Sig Start Date End Date Taking? Authorizing Provider   latanoprost (XALATAN) 0.005 % ophthalmic solution Place 1 drop into both eyes nightly   Yes Mitesh Daily MD   Calcium Citrate-Vitamin D (SM CALCIUM CITRATE W/VIT D3 PO) Take 1,000 mg by mouth Daily   Yes Mitesh Daily MD   lidocaine (BLUE-EMU PAIN RELIEF DRY) 4 % external patch Place 1 patch onto the skin daily   Yes Mitesh Daily MD   ferrous sulfate (IRON 325) 325 (65 Fe) MG tablet Take 1 tablet by mouth daily (with breakfast)   Yes Mitesh Daily MD   atorvastatin (LIPITOR) 40 MG tablet Take 0.5 tablets by mouth daily 24  Yes Mitesh Daiyl MD   cyanocobalamin 500 MCG tablet Take 2 tablets by mouth daily   Yes Mitesh Daily MD   hydrALAZINE (APRESOLINE) 25 MG tablet Take 1 tablet by mouth 3 times daily   Yes Mitesh Daily MD   megestrol (MEGACE) 40 MG/ML suspension Take 10 mLs by mouth daily 24  Yes Mitesh Daily MD   primidone (MYSOLINE) 50 MG tablet Take 150 tablets by mouth 3 times daily 22  Yes Mitesh Daily  56.5 kg (124 lb 9 oz)   SpO2 99%   BMI 20.10 kg/m²   Temp (24hrs), Av.5 °F (36.9 °C), Min:97.7 °F (36.5 °C), Max:99 °F (37.2 °C)      PHYSICAL EXAM:   General: alert, awake, well-oriented, no acute distress.  HEENT: EOMI, no icterus, pallor+, pupils reactive, mucosa dry, normal inspection of ears and nose, throat clear.  Neck: Neck is supple, No JVD.  Lungs:  absent on RLL, CTA on left side  CVS: heart sounds normal, regular rate and rhythm.  GI: soft, nontender, normal BS.  Extremities: no clubbing, no cyanosis, no edema.  Neuro: Alert, awake, oriented x3,  moving all extremities well.  Skin: poor skin turgor, no skin rashes   .    LAB DATA REVIEWED:    Recent Results (from the past 24 hour(s))   CBC with Auto Differential    Collection Time: 24  1:23 AM   Result Value Ref Range    WBC 8.0 4.1 - 11.1 K/uL    RBC 2.47 (L) 4.10 - 5.70 M/uL    Hemoglobin 7.8 (L) 12.1 - 17.0 g/dL    Hematocrit 23.9 (L) 36.6 - 50.3 %    MCV 96.8 80.0 - 99.0 FL    MCH 31.6 26.0 - 34.0 PG    MCHC 32.6 30.0 - 36.5 g/dL    RDW 17.1 (H) 11.5 - 14.5 %    Platelets 210 150 - 400 K/uL    MPV 9.8 8.9 - 12.9 FL    Nucleated RBCs 0.0 0.0  WBC    nRBC 0.00 0.00 - 0.01 K/uL    Neutrophils % 73 32 - 75 %    Lymphocytes % 5 (L) 12 - 49 %    Monocytes % 19 (H) 5 - 13 %    Eosinophils % 2 0 - 7 %    Basophils % 0 0 - 1 %    Immature Granulocytes % 1 (H) 0 - 0.5 %    Neutrophils Absolute 5.9 1.8 - 8.0 K/UL    Lymphocytes Absolute 0.4 (L) 0.8 - 3.5 K/UL    Monocytes Absolute 1.5 (H) 0.0 - 1.0 K/UL    Eosinophils Absolute 0.2 0.0 - 0.4 K/UL    Basophils Absolute 0.0 0.0 - 0.1 K/UL    Immature Granulocytes Absolute 0.0 0.00 - 0.04 K/UL    Differential Type AUTOMATED     Comprehensive Metabolic Panel    Collection Time: 24  1:23 AM   Result Value Ref Range    Sodium 140 136 - 145 mmol/L    Potassium 4.8 3.5 - 5.1 mmol/L    Chloride 110 (H) 97 - 108 mmol/L    CO2 21 21 - 32 mmol/L    Anion Gap 9 5 - 15 mmol/L    Glucose 111 (H) 65 -

## 2024-08-19 NOTE — PROGRESS NOTES
Hospitalist Progress Note    NAME:   Abel Cruz   : 1940   MRN: [id number]     Date/Time: 2024 9:06 AM  Patient PCP: Jeremy Grimes MD    Estimated discharge date:  Barriers:       Assessment / Plan:    Abnormal CT of the chest concerning for malignancy  Pulmonology eval to patient and fill this could be metastatic disease from colon/kidney.  However, pulmonary arteries are tiny and not amenable for bronchoscopy, biopsy or CT-guided biopsy.  Will follow pleural fluid studies from right-sided thoracentesis to determine nature of pleural fluid.  Oncology evaluated patient and patient would need outpatient PET scan     Right-sided pleural effusion  Acute hypoxic respiratory failure secondary to above, resolved, on room air  Due to pleural cytology being negative pulmonology recommending outpatient PET scan for further evaluation of possible malignancy  IR complete right-sided thoracentesis removing 1500 cc of thin red fluid from right posterior chest  Pleural fluid collected from a thoracentesis revealed no cells diagnostic for malignancy  S/p thoracentesis CXR revealed no pneumothorax  IV Dilaudid as needed for pain control  CXR revealed increased bibasilar airspace disease which may resent atelectasis versus pneumonia.  Probable small right pleural effusion.  Persistent mild pulmonary edema     Acute A-fib with controlled rate  SVT  Episodes of SVT and increased Coreg to 12.5 twice daily.    Now requiring diltiazem gtt. at 7.5 mg/h.  -Echo completed revealed severely reduced left ventricular systolic function with a EF of 30 to 35%.  Moderately increased wall thickness.  Global hypokinesis.  Grade 1 diastolic dysfunction with normal LAP.  Cardiology evaluation pending this a.m.     Right-sided hydronephrosis  S/p left nephrectomy many years ago  History of prostate cancer   Urology evaluated patient and planning for cystoscopy with retrograde pyelogram and possible stent placement of the right  transferred to the ICU on 8/15/2024 after continue experience frequent episodes of SVT requiring medication administration.  Initiated patient on as needed IV metoprolol for persistent tachycardia.  Patient requiring supplemental oxygen again so will repeat CXR for possible repeat thoracentesis if needed.  CXR revealed increased bibasilar airspace disease which may resent atelectasis versus pneumonia.  Probable small right pleural effusion.  Persistent mild pulmonary edema     8/19  Patient is alert and oriented x 3 however currently experiencing shortness of breath.  Currently requiring 2 L NC O2 with episode of A-fib RVR early this a.m. with rate in the 160s.  RRT called around 9:30 AM with shortness of breath and A-fib RVR.  Overnight events noted and patient had already been started on diltiazem gtt. at 5 mg/h.  A.m. chest x-ray with persistent right-sided pleural effusion with edema noted.  Troponins have been negative thus far.  Counseled on continued monitoring.  Cardiology evaluation pending this a.m.    Objective:     VITALS:   Last 24hrs VS reviewed since prior progress note. Most recent are:  Patient Vitals for the past 24 hrs:   BP Temp Temp src Pulse Resp SpO2   08/19/24 0736 133/81 99 °F (37.2 °C) Oral 100 -- 100 %   08/19/24 0249 (!) 133/96 98.6 °F (37 °C) Oral 100 16 98 %   08/19/24 0040 130/83 97.7 °F (36.5 °C) Axillary 100 16 99 %   08/18/24 1919 103/76 98.1 °F (36.7 °C) Oral 90 18 100 %   08/18/24 1456 126/76 98.2 °F (36.8 °C) Oral (!) 108 18 100 %   08/18/24 1226 (!) 154/100 -- Oral (!) 119 18 94 %   08/18/24 0908 (!) 129/100 98.4 °F (36.9 °C) Oral (!) 113 20 92 %         Intake/Output Summary (Last 24 hours) at 8/19/2024 0906  Last data filed at 8/19/2024 0548  Gross per 24 hour   Intake 976.6 ml   Output 625 ml   Net 351.6 ml        I had a face to face encounter and independently examined this patient on 8/19/2024, as outlined below:  PHYSICAL EXAM:  General: Alert, cooperative  EENT:  EOMI.

## 2024-08-19 NOTE — PROGRESS NOTES
PT treatment session attempted at 0929, however holding tx session per nursing due to elevated HR. Will continue to check on pt and see them for treatment session at a later time. Thank you.

## 2024-08-19 NOTE — PROGRESS NOTES
OT eval order received and acknowledged. OT eval attempted at 155 however per nsg pt continues to c/o SOB and is not cleared for eval at this time. Will continue to follow patient and attempt OT eval at a later time. Thank you.

## 2024-08-20 ENCOUNTER — APPOINTMENT (OUTPATIENT)
Facility: HOSPITAL | Age: 84
DRG: 204 | End: 2024-08-20
Attending: INTERNAL MEDICINE
Payer: OTHER GOVERNMENT

## 2024-08-20 ENCOUNTER — APPOINTMENT (OUTPATIENT)
Facility: HOSPITAL | Age: 84
DRG: 204 | End: 2024-08-20
Payer: OTHER GOVERNMENT

## 2024-08-20 LAB
ALBUMIN SERPL-MCNC: 2 G/DL (ref 3.5–5)
ANION GAP SERPL CALC-SCNC: 11 MMOL/L (ref 5–15)
APPEARANCE FLD: ABNORMAL
BASOPHILS # BLD: 0 K/UL (ref 0–0.1)
BASOPHILS NFR BLD: 1 % (ref 0–1)
BNP SERPL-MCNC: 5212 PG/ML
BUN SERPL-MCNC: 60 MG/DL (ref 6–20)
BUN/CREAT SERPL: 20 (ref 12–20)
CA-I BLD-MCNC: 9.2 MG/DL (ref 8.5–10.1)
CHLORIDE SERPL-SCNC: 108 MMOL/L (ref 97–108)
CO2 SERPL-SCNC: 19 MMOL/L (ref 21–32)
COLOR FLD: ABNORMAL
CREAT SERPL-MCNC: 3.03 MG/DL (ref 0.7–1.3)
CRP SERPL-MCNC: 20.7 MG/DL (ref 0–0.3)
DIFFERENTIAL METHOD BLD: ABNORMAL
EOSINOPHIL # BLD: 0.2 K/UL (ref 0–0.4)
EOSINOPHIL NFR BLD: 3 % (ref 0–7)
ERYTHROCYTE [DISTWIDTH] IN BLOOD BY AUTOMATED COUNT: 16.7 % (ref 11.5–14.5)
GLUCOSE FLD-MCNC: 90 MG/DL
GLUCOSE SERPL-MCNC: 142 MG/DL (ref 65–100)
HCT VFR BLD AUTO: 23.3 % (ref 36.6–50.3)
HGB BLD-MCNC: 7.4 G/DL (ref 12.1–17)
IMM GRANULOCYTES # BLD AUTO: 0 K/UL (ref 0–0.04)
IMM GRANULOCYTES NFR BLD AUTO: 1 % (ref 0–0.5)
LDH FLD L TO P-CCNC: 366 U/L
LYMPHOCYTES # BLD: 0.3 K/UL (ref 0.8–3.5)
LYMPHOCYTES NFR BLD: 4 % (ref 12–49)
LYMPHOCYTES NFR FLD: 17 %
MCH RBC QN AUTO: 31.5 PG (ref 26–34)
MCHC RBC AUTO-ENTMCNC: 31.8 G/DL (ref 30–36.5)
MCV RBC AUTO: 99.1 FL (ref 80–99)
MONOCYTES # BLD: 1 K/UL (ref 0–1)
MONOCYTES NFR BLD: 17 % (ref 5–13)
MONOS+MACROS NFR FLD: 15 %
NEUTROPHILS NFR FLD: 68 %
NEUTS SEG # BLD: 4.6 K/UL (ref 1.8–8)
NEUTS SEG NFR BLD: 74 % (ref 32–75)
NRBC # BLD: 0 K/UL (ref 0–0.01)
NRBC BLD-RTO: 0 PER 100 WBC
NUC CELL # FLD: 1163 /CU MM
PHOSPHATE SERPL-MCNC: 4.7 MG/DL (ref 2.6–4.7)
PLATELET # BLD AUTO: 190 K/UL (ref 150–400)
PMV BLD AUTO: 9.8 FL (ref 8.9–12.9)
POTASSIUM SERPL-SCNC: 4.7 MMOL/L (ref 3.5–5.1)
PROCALCITONIN SERPL-MCNC: 1.21 NG/ML
PROT FLD-MCNC: 4.2 G/DL
RBC # BLD AUTO: 2.35 M/UL (ref 4.1–5.7)
RBC # FLD: >100 /CU MM
SODIUM SERPL-SCNC: 138 MMOL/L (ref 136–145)
SPECIMEN SOURCE FLD: ABNORMAL
SPECIMEN SOURCE FLD: NORMAL
WBC # BLD AUTO: 6.1 K/UL (ref 4.1–11.1)

## 2024-08-20 PROCEDURE — 6360000002 HC RX W HCPCS: Performed by: INTERNAL MEDICINE

## 2024-08-20 PROCEDURE — 86140 C-REACTIVE PROTEIN: CPT

## 2024-08-20 PROCEDURE — 89050 BODY FLUID CELL COUNT: CPT

## 2024-08-20 PROCEDURE — 85025 COMPLETE CBC W/AUTO DIFF WBC: CPT

## 2024-08-20 PROCEDURE — 71045 X-RAY EXAM CHEST 1 VIEW: CPT

## 2024-08-20 PROCEDURE — 97530 THERAPEUTIC ACTIVITIES: CPT

## 2024-08-20 PROCEDURE — 83880 ASSAY OF NATRIURETIC PEPTIDE: CPT

## 2024-08-20 PROCEDURE — 83615 LACTATE (LD) (LDH) ENZYME: CPT

## 2024-08-20 PROCEDURE — 6370000000 HC RX 637 (ALT 250 FOR IP): Performed by: INTERNAL MEDICINE

## 2024-08-20 PROCEDURE — 82945 GLUCOSE OTHER FLUID: CPT

## 2024-08-20 PROCEDURE — 88305 TISSUE EXAM BY PATHOLOGIST: CPT

## 2024-08-20 PROCEDURE — 2580000003 HC RX 258: Performed by: INTERNAL MEDICINE

## 2024-08-20 PROCEDURE — 80069 RENAL FUNCTION PANEL: CPT

## 2024-08-20 PROCEDURE — 87015 SPECIMEN INFECT AGNT CONCNTJ: CPT

## 2024-08-20 PROCEDURE — 6370000000 HC RX 637 (ALT 250 FOR IP): Performed by: STUDENT IN AN ORGANIZED HEALTH CARE EDUCATION/TRAINING PROGRAM

## 2024-08-20 PROCEDURE — 2060000000 HC ICU INTERMEDIATE R&B

## 2024-08-20 PROCEDURE — 87205 SMEAR GRAM STAIN: CPT

## 2024-08-20 PROCEDURE — 6370000000 HC RX 637 (ALT 250 FOR IP)

## 2024-08-20 PROCEDURE — 84157 ASSAY OF PROTEIN OTHER: CPT

## 2024-08-20 PROCEDURE — 6360000002 HC RX W HCPCS: Performed by: HOSPITALIST

## 2024-08-20 PROCEDURE — 88112 CYTOPATH CELL ENHANCE TECH: CPT

## 2024-08-20 PROCEDURE — 97165 OT EVAL LOW COMPLEX 30 MIN: CPT

## 2024-08-20 PROCEDURE — 94640 AIRWAY INHALATION TREATMENT: CPT

## 2024-08-20 PROCEDURE — 84145 PROCALCITONIN (PCT): CPT

## 2024-08-20 PROCEDURE — 94761 N-INVAS EAR/PLS OXIMETRY MLT: CPT

## 2024-08-20 PROCEDURE — 97166 OT EVAL MOD COMPLEX 45 MIN: CPT

## 2024-08-20 PROCEDURE — 2700000000 HC OXYGEN THERAPY PER DAY

## 2024-08-20 PROCEDURE — 0W993ZZ DRAINAGE OF RIGHT PLEURAL CAVITY, PERCUTANEOUS APPROACH: ICD-10-PCS | Performed by: STUDENT IN AN ORGANIZED HEALTH CARE EDUCATION/TRAINING PROGRAM

## 2024-08-20 PROCEDURE — 87070 CULTURE OTHR SPECIMN AEROBIC: CPT

## 2024-08-20 PROCEDURE — C1729 CATH, DRAINAGE: HCPCS

## 2024-08-20 RX ORDER — SODIUM BICARBONATE 650 MG/1
1300 TABLET ORAL 2 TIMES DAILY
Status: DISCONTINUED | OUTPATIENT
Start: 2024-08-20 | End: 2024-08-22

## 2024-08-20 RX ORDER — ALBUTEROL SULFATE 2.5 MG/.5ML
2.5 SOLUTION RESPIRATORY (INHALATION) EVERY 4 HOURS PRN
Status: DISCONTINUED | OUTPATIENT
Start: 2024-08-20 | End: 2024-08-28 | Stop reason: HOSPADM

## 2024-08-20 RX ORDER — DOXYCYCLINE 100 MG/1
100 CAPSULE ORAL 2 TIMES DAILY
Status: COMPLETED | OUTPATIENT
Start: 2024-08-20 | End: 2024-08-26

## 2024-08-20 RX ADMIN — CARVEDILOL 25 MG: 12.5 TABLET, FILM COATED ORAL at 07:59

## 2024-08-20 RX ADMIN — METOCLOPRAMIDE 10 MG: 10 TABLET ORAL at 12:18

## 2024-08-20 RX ADMIN — HEPARIN SODIUM 5000 UNITS: 5000 INJECTION INTRAVENOUS; SUBCUTANEOUS at 05:19

## 2024-08-20 RX ADMIN — ALBUTEROL SULFATE 2.5 MG: 2.5 SOLUTION RESPIRATORY (INHALATION) at 06:19

## 2024-08-20 RX ADMIN — SODIUM BICARBONATE 1300 MG: 650 TABLET ORAL at 20:48

## 2024-08-20 RX ADMIN — DOXYCYCLINE HYCLATE 100 MG: 100 CAPSULE ORAL at 20:48

## 2024-08-20 RX ADMIN — Medication 5 MG: at 21:38

## 2024-08-20 RX ADMIN — SODIUM CHLORIDE, PRESERVATIVE FREE 10 ML: 5 INJECTION INTRAVENOUS at 08:02

## 2024-08-20 RX ADMIN — CIPROFLOXACIN HYDROCHLORIDE 500 MG: 500 TABLET, FILM COATED ORAL at 08:00

## 2024-08-20 RX ADMIN — ATORVASTATIN CALCIUM 20 MG: 20 TABLET, FILM COATED ORAL at 08:00

## 2024-08-20 RX ADMIN — SODIUM CHLORIDE, PRESERVATIVE FREE 10 ML: 5 INJECTION INTRAVENOUS at 20:48

## 2024-08-20 RX ADMIN — METOCLOPRAMIDE 10 MG: 10 TABLET ORAL at 05:19

## 2024-08-20 RX ADMIN — HEPARIN SODIUM 5000 UNITS: 5000 INJECTION INTRAVENOUS; SUBCUTANEOUS at 14:15

## 2024-08-20 RX ADMIN — EPOETIN ALFA-EPBX 10000 UNITS: 10000 INJECTION, SOLUTION INTRAVENOUS; SUBCUTANEOUS at 16:16

## 2024-08-20 RX ADMIN — MEGESTROL ACETATE 400 MG: 40 SUSPENSION ORAL at 07:59

## 2024-08-20 RX ADMIN — FOLIC ACID 1 MG: 1 TABLET ORAL at 08:00

## 2024-08-20 RX ADMIN — LORAZEPAM 1 MG: 1 TABLET ORAL at 01:25

## 2024-08-20 RX ADMIN — SODIUM BICARBONATE 650 MG: 650 TABLET ORAL at 08:00

## 2024-08-20 RX ADMIN — PRIMIDONE 100 MG: 50 TABLET ORAL at 20:48

## 2024-08-20 RX ADMIN — CEFTRIAXONE SODIUM 2000 MG: 2 INJECTION, POWDER, FOR SOLUTION INTRAMUSCULAR; INTRAVENOUS at 14:14

## 2024-08-20 RX ADMIN — HEPARIN SODIUM 5000 UNITS: 5000 INJECTION INTRAVENOUS; SUBCUTANEOUS at 20:47

## 2024-08-20 RX ADMIN — CARVEDILOL 25 MG: 12.5 TABLET, FILM COATED ORAL at 16:17

## 2024-08-20 RX ADMIN — METOCLOPRAMIDE 10 MG: 10 TABLET ORAL at 14:15

## 2024-08-20 RX ADMIN — PRIMIDONE 100 MG: 50 TABLET ORAL at 08:00

## 2024-08-20 RX ADMIN — DOXYCYCLINE HYCLATE 100 MG: 100 CAPSULE ORAL at 14:24

## 2024-08-20 ASSESSMENT — PAIN SCALES - GENERAL
PAINLEVEL_OUTOF10: 0

## 2024-08-20 NOTE — PROGRESS NOTES
OCCUPATIONAL THERAPY EVALUATION  Patient: Abel Cruz (83 y.o. male)  Date: 8/20/2024  Primary Diagnosis: Hyperkalemia [E87.5]  Pleural effusion [J90]  NETO (acute kidney injury) (HCC) [N17.9]  Procedure(s) (LRB):  CYSTOSCOPY, RETROGRADE PYELOGRAM, URETERAL STENT INSERTION (Right) 6 Days Post-Op   Precautions: Fall Risk                Recommendations for nursing mobility: Out of bed to chair for meals, Use of bed/chair alarm for safety, AD and gt belt for bed to chair , Amb to bathroom with AD and gait belt, and Assist x1    In place during session:Nasal Cannula 1L, Kim Catheter, and EKG/telemetry   ASSESSMENT  Pt is a 83 y.o. male presenting to [facility] with c/o right flank pain for ~1 month and reports of unintentional weight loss ~10-15lbs, admitted 8/11/2024 and currently being treated for tachycardia, abnormal chest CT concerning for malignancy, right pleural effusion, UTI, R sided hydronephrosis and new diagnosis of EF 30-35%. Pt underwent thoracentesis 8/20/2024. Pt w/ hemoglobin of 7.4 this date. Pt received semi-supine in bed upon arrival, AXO x4, and agreeable to OT evaluation.     Based on current observations, pt presents with decreased  functional mobility, independence in ADLs, high-level IADLs, ROM, strength, body mechanics, activity tolerance, endurance, safety awareness, coordination, balance, posture (see below for objective details and assist levels).     Overall, pt tolerates session fair today with soreness through thoracentesis site and 8/10 pain through site w/ coughing, sneezing and mobility tasks. Pt performed sup to sit and scooting to EOB CGA w/ significantly increased time, HOB elevated, and use of bed rails. Once at EOB, pt required maxA to don socks, pt was able to adjust socks once donned, however, demo'd difficulty reaching down to feet. Pt's HR increased to 107 seated EOB, however, returned to <100 w/ seated rest at EOB. BUE ROM assessed seated EOB, pt presents w/ decreased shoulder  and Planned Interventions: self care training, therapeutic activities, functional mobility training, balance training, therapeutic exercise, endurance activities, cognitive retraining, patient education, home safety training, and family training/education    Recommend next OT session: LB dressing and standing grooming    Frequency/Duration: Patient will be followed by occupational therapy:  3-5x/week to address goals.    Recommendation for discharge: (in order for the patient to meet his/her long term goals)  Inpatient Rehabilitation Facility     IF patient discharges home will need the following DME: continuing to assess with progress     SUBJECTIVE:   Patient stated “I'm breathing.”    OBJECTIVE DATA SUMMARY:     Past Medical History:   Diagnosis Date    Hypertension      Past Surgical History:   Procedure Laterality Date    KIDNEY REMOVAL      over 20 years ago        Expanded or extensive additional review of patient history:   Lives With: Alone  Type of Home: Apartment  Home Layout: Multi-level  Home Access: Stairs to enter without rails, Elevator        Bathroom Shower/Tub: Tub/Shower unit  Bathroom Toilet: Handicap height  Bathroom Equipment: Grab bars in shower, Shower chair  Bathroom Accessibility: Accessible  Home Equipment: None  Has the patient had two or more falls in the past year or any fall with injury in the past year?: No  Social/Functional History  Lives With: Alone  Type of Home: Apartment  Home Layout: Multi-level  Home Access: Stairs to enter without rails, Elevator  Entrance Stairs - Number of Steps: 4 the Floor  Bathroom Shower/Tub: Tub/Shower unit  Bathroom Toilet: Handicap height  Bathroom Equipment: Grab bars in shower, Shower chair  Bathroom Accessibility: Accessible  Home Equipment: None  Has the patient had two or more falls in the past year or any fall with injury in the past year?: No  Receives Help From: Family  ADL Assistance: Independent  Homemaking Assistance:

## 2024-08-20 NOTE — PROGRESS NOTES
HemOnc note states patient has a history of iron deficiency anemia as well as stage IV CKD  -Hemoglobin on admission 9.7; currently stable  -Iron studies show deficiency completed supplementation, B12/Folate replete  -SPEP without monoclonal protein   -Continue to monitor for any bleeding, monitor CBC  -Transfuse for hemoglobin less than 7  -Reported Colonoscopy in 2022: unremarkable.     Right hydronephrosis  -Uncertain cause, evaluated by urology and plan for cystoscopy and right retrograde pyelogram on 8/14 was canceled because of SVT   -urology stated follow up OP to discuss timing of diagnostic workup     Acute Kidney Injury   -History of left nephrectomy; nephrology following    Signed By: ESTEBAN Galdamez - CNP     August 20, 2024          I have seen, examined and evaluated the patient with Ana Key NP under my direct supervision. I have reviewed the note and agree with the assessment and plan of care as documented.   HPI, social history, family history, ROS, physical examination and management plan have been reviewed and verified.    Ponce Smith MD.

## 2024-08-20 NOTE — PROGRESS NOTES
Pulmonary Progress Note    Subjective:     Patient seen and examined in his room on the floor this afternoon, no acute events overnight.  Saturating well on 1-2 L nasal cannula, chronically ill-appearing but reports his breathing is relatively stable.  Status post 1.65 L thoracentesis earlier today with removal of serosanguineous fluid.  Labs pending, but cell count with differential indicates more of a neutrophil predominant process, which is what was present in his previous thoracentesis fluid.  This would seem to suggest more of a infectious or acute process, therefore I will discontinue oral ciprofloxacin and switch to IV Rocephin/doxycycline for now.  Postprocedural chest x-ray shows significant improvement/reduction in right-sided pleural effusion.  Of note, a CT chest was ordered on 8/19/2024 prior to the thoracentesis showing a large partially loculated right-sided pleural effusion without obvious pneumonia/nodules/edema.  This likely would have been more beneficial if this was ordered after the thoracentesis today.  Hold off on repeat chest imaging for now.  Negative mycoplasma testing, creatinine up to 3.03 from 2.79, proBNP level 5212, CBC stable.  Net -1.14 L yesterday.  Awaiting pleural fluid cytology/culture.       inpatient rehab at discharge.          Patient Active Problem List   Diagnosis    Pleural effusion    Hydronephrosis        Allergies   Allergen Reactions    Naproxen Other (See Comments)     Gastric Ulcer With Hemorrhage.    Lisinopril Nausea And Vomiting    Morphine Rash        Review of Systems:  A comprehensive review of systems was negative except for that written in the History of Present Illness.    Labs:    Recent Labs     08/18/24  0254 08/19/24  0123 08/20/24  1148   WBC 6.6 8.0 6.1   HGB 8.1* 7.8* 7.4*   HCT 25.4* 23.9* 23.3*    210 190     Recent Labs     08/18/24  0254 08/19/24  0123 08/20/24  1148    140 138   K 4.8 4.8 4.7    110* 108

## 2024-08-20 NOTE — PROGRESS NOTES
NAME:  Abel Cruz   :   1940   MRN:   [id number]     ATTENDING: Nils Malik Cha, MD  PCP:  Jeremy Grimes MD    Date/Time:  2024       Subjective:     Patient was seen at bedside, sitting up in bedside chair, acutely ill appearing, no swelling in extremities.    Creatinine stable 3.0.    Past Medical History:   Diagnosis Date    Hypertension       Past Surgical History:   Procedure Laterality Date    KIDNEY REMOVAL      over 20 years ago     Social History     Tobacco Use    Smoking status: Former     Types: Cigarettes    Smokeless tobacco: Not on file   Substance Use Topics    Alcohol use: Not on file      History reviewed. No pertinent family history.    Allergies   Allergen Reactions    Naproxen Other (See Comments)     Gastric Ulcer With Hemorrhage.    Lisinopril Nausea And Vomiting    Morphine Rash      Prior to Admission medications    Medication Sig Start Date End Date Taking? Authorizing Provider   latanoprost (XALATAN) 0.005 % ophthalmic solution Place 1 drop into both eyes nightly   Yes Mitesh Daily MD   Calcium Citrate-Vitamin D (SM CALCIUM CITRATE W/VIT D3 PO) Take 1,000 mg by mouth Daily   Yes Mitesh Daily MD   lidocaine (BLUE-EMU PAIN RELIEF DRY) 4 % external patch Place 1 patch onto the skin daily   Yes Mitesh Daily MD   ferrous sulfate (IRON 325) 325 (65 Fe) MG tablet Take 1 tablet by mouth daily (with breakfast)   Yes Mitesh Daily MD   atorvastatin (LIPITOR) 40 MG tablet Take 0.5 tablets by mouth daily 24  Yes Mitesh Daily MD   cyanocobalamin 500 MCG tablet Take 2 tablets by mouth daily   Yes Mitesh Daily MD   hydrALAZINE (APRESOLINE) 25 MG tablet Take 1 tablet by mouth 3 times daily   Yes Mitesh Daily MD   megestrol (MEGACE) 40 MG/ML suspension Take 10 mLs by mouth daily 24  Yes Mitesh Daily MD   primidone (MYSOLINE) 50 MG tablet Take 150 tablets by mouth 3 times daily 22  Yes Mitesh Daily

## 2024-08-20 NOTE — PROGRESS NOTES
Comprehensive Nutrition Assessment    Type and Reason for Visit:  Reassess    Nutrition Recommendations/Plan:   Continue Current Diet   Encourage adequate intake  Initiate ONS 2x/day  Monitor PO intake and BM in I/Os     Malnutrition Assessment:  Malnutrition Status:  Mild malnutrition (08/13/24 1209)    Context:  Acute Illness     Findings of the 6 clinical characteristics of malnutrition:  Energy Intake:  Unable to assess  Weight Loss:  Unable to assess     Body Fat Loss:  Unable to assess     Muscle Mass Loss:  Mild muscle mass loss Temples (temporalis), Hand (interosseous)  Fluid Accumulation:  Unable to assess     Strength:  Not Performed    Nutrition Assessment:    RD assessed for MST 2. Pt sleeping at time of visit. Spoke w RN who reports PO intake > 50%. Plan to continue to monitor. Meds include atorvasatatin and folic acid. Abnormal labs include BUN 79, Cr 3.23, GFR 19, Glucose 140, Phos 5.1, Vit B12 > 2000, Albumin 2.4, H/H 8.5/26.1.       8/20/24: Rd f/u w pt at bedside who reports that appetite is \"getting there\" w ~25% PO intake from breakfast and > 50% of lunch eaten during visit. Plan to continue to monitor. Meds include atorvastatin, folic acid and metoclopramide. Abnormal labs include Ch 110, BUN 56, Cr 2.79, GFR 22, Glucose 11, Albumin 1.9, AST 53, H/H 7.8/23.9.    Nutrition Related Findings:    NFPE deferred per pt eating. Reports feeling slightly nauseous earlier today but no vomiting. No dysphagia. LBM 8/19. Wound Type:  (Puncture)       Current Nutrition Intake & Therapies:    Average Meal Intake: 1-25%, 51-75%  Average Supplements Intake: Unable to assess  ADULT DIET; Easy to Chew; Low Fat/Low Chol/High Fiber/KAREN    Anthropometric Measures:  Height: 167.6 cm (5' 6\")  Ideal Body Weight (IBW): 142 lbs (65 kg)       Current Body Weight: 54.4 kg (119 lb 14.9 oz),   IBW.    Current BMI (kg/m2): 19.4                          BMI Categories: Underweight (BMI less than 22) age over 65    Estimated  Daily Nutrient Needs:  Energy Requirements Based On: Kcal/kg  Weight Used for Energy Requirements: Current  Energy (kcal/day): 1630 - 1900 kcal ( 30-35 kcal / cBW)  Weight Used for Protein Requirements: Current  Protein (g/day): 65 g ( 1.2 g / CBW)  Method Used for Fluid Requirements: 1 ml/kcal  Fluid (ml/day): 1630 - 1900 ml/kcal    Nutrition Diagnosis:   Underweight related to increase demand for energy/nutrients as evidenced by BMI    Nutrition Interventions:   Food and/or Nutrient Delivery: Continue Current Diet, Start Oral Nutrition Supplement  Nutrition Education/Counseling: No recommendation at this time  Coordination of Nutrition Care: Continue to monitor while inpatient  Plan of Care discussed with: RN    Goals:  Previous Goal Met: Progressing toward Goal(s)  Goals: PO intake 75% or greater, by next RD assessment       Nutrition Monitoring and Evaluation:   Behavioral-Environmental Outcomes: None Identified  Food/Nutrient Intake Outcomes: Food and Nutrient Intake  Physical Signs/Symptoms Outcomes: Nutrition Focused Physical Findings, Weight    Discharge Planning:    Too soon to determine     Judith Alva RD  Contact: [id number] or [facility]

## 2024-08-20 NOTE — PLAN OF CARE
Problem: Discharge Planning  Goal: Discharge to home or other facility with appropriate resources  Outcome: Progressing     Problem: Pain  Goal: Verbalizes/displays adequate comfort level or baseline comfort level  Outcome: Progressing     Problem: Skin/Tissue Integrity  Goal: Absence of new skin breakdown  Description: 1.  Monitor for areas of redness and/or skin breakdown  2.  Assess vascular access sites hourly  3.  Every 4-6 hours minimum:  Change oxygen saturation probe site  4.  Every 4-6 hours:  If on nasal continuous positive airway pressure, respiratory therapy assess nares and determine need for appliance change or resting period.  Outcome: Progressing     Problem: ABCDS Injury Assessment  Goal: Absence of physical injury  Outcome: Progressing     Problem: Safety - Adult  Goal: Free from fall injury  Outcome: Progressing     Problem: Respiratory - Adult  Goal: Achieves optimal ventilation and oxygenation  Outcome: Progressing     Problem: Skin/Tissue Integrity - Adult  Goal: Skin integrity remains intact  Outcome: Progressing  Goal: Incisions, wounds, or drain sites healing without S/S of infection  Outcome: Progressing  Goal: Oral mucous membranes remain intact  Outcome: Progressing     Problem: Genitourinary - Adult  Goal: Absence of urinary retention  Outcome: Progressing  Goal: Urinary catheter remains patent  Outcome: Progressing     Problem: Metabolic/Fluid and Electrolytes - Adult  Goal: Electrolytes maintained within normal limits  Outcome: Progressing  Goal: Hemodynamic stability and optimal renal function maintained  Outcome: Progressing  Goal: Glucose maintained within prescribed range  Outcome: Progressing     Problem: Neurosensory - Adult  Goal: Achieves stable or improved neurological status  Outcome: Progressing  Goal: Absence of seizures  Outcome: Progressing  Goal: Remains free of injury related to seizures activity  Outcome: Progressing  Goal: Achieves maximal functionality and self

## 2024-08-20 NOTE — PROGRESS NOTES
CARDIOLOGY PROGRESS NOTE      Patient Name: Abel Cruz  Age: 83 y.o.  Gender:male  :1940  MRN: [id number]    Patient seen and examined. This is a patient with a history of hypertension, hyperlipidemia, tremors, CKD s/p left nephrectomy, prostate cancer, chronic anemia  who presented with pleuritic pain, cough now being followed for SVT.      remains in ICU. No recurrent SVT.  No chest pain or shortness of breath.  Denies palpitations.  No other complaints reported.     out of ICU, comfortable in recliner on tele. No chest pain or dyspnea.      sitting upright in bed. Family at bedside. Complains of anxiety. No chest pain or dyspnea.      repeat thoracentesis this AM, reports some discomfort at procedure site. Denies chest pain. Breathing improved from yesterday     Telemetry reviewed, there were no events noted in the past 24 hours.    Pertinent review of systems items noted above, all other systems are negative. Current medications reviewed.    Physical Examination    Allergies   Allergen Reactions    Naproxen Other (See Comments)     Gastric Ulcer With Hemorrhage.    Lisinopril Nausea And Vomiting    Morphine Rash     Vitals:    24 1101   BP: 97/69   Pulse: 93   Resp:    Temp: 98.2 °F (36.8 °C)   SpO2: 100%     Vital signs are stable  No apparent distress, frail, chronically ill appearing  Heart has a mildly tachycardic rate and regular rhythm.  no murmur  Lungs are diminished  Abdomen is soft, nontender, normal bowel sounds.  Extremities have no edema  Skin is dry and warm.  Normal affect    Labs reviewed:  Recent Results (from the past 12 hour(s))   Body fluid cell count    Collection Time: 24 10:10 AM   Result Value Ref Range    Specimen Pleural fluid      Color, Fluid Red      Appearance, Fluid Turbid      RBC, Fluid >100 (H) 0 /cu mm    Total Nucleated Cell Count 1,163 /cu mm    Polys, Fluid 68 (A) No reference range has been established. %    Lymphocytes, Body Fluid

## 2024-08-20 NOTE — PROGRESS NOTES
C/o SOB RR22, hr 110, /100, TEMP 99.1. Covering provider notified new order see MAR. Respiratory notified

## 2024-08-20 NOTE — OR NURSING
1650cc serosanguinous fluid drained from right posterior chest. Fluids to lab as ordered. Stat cxr ordered.

## 2024-08-20 NOTE — PROGRESS NOTES
Hospitalist Progress Note    NAME:   Abel Cruz   : 1940   MRN: [id number]     Date/Time: 2024 12:28 PM  Patient PCP: Jeremy Grimes MD    Estimated discharge date: 48 hours  Barriers: Pulmonary and oncology clearance      Assessment / Plan:    Abnormal CT of the chest concerning for malignancy  Lung nodules  Pulmonology eval to patient and fill this could be metastatic disease from colon/kidney.  However, pulmonary arteries are tiny and not amenable for bronchoscopy, biopsy or CT-guided biopsy.  Will follow pleural fluid studies from right-sided thoracentesis to determine nature of pleural fluid.  Oncology evaluated patient and patient would need outpatient PET scan     Right-sided pleural effusion  Acute hypoxic respiratory failure secondary to above, resolved, on room air  Due to pleural cytology being negative pulmonology recommending outpatient PET scan for further evaluation of possible malignancy  -IR complete right-sided thoracentesis removing 1500 cc of thin red fluid from right posterior chest. Pleural fluid collected from a thoracentesis revealed no cells diagnostic for malignancy  -S/P repeat thoracentesis on , pending labs.     Acute A-fib with controlled rate  SVT  Episodes of SVT and increased Coreg to 25 mg twice daily.    -Echo completed revealed severely reduced left ventricular systolic function with a EF of 30 to 35%.  Moderately increased wall thickness.  Global hypokinesis.  Grade 1 diastolic dysfunction with normal LAP.  -Cardiology consulted and following.  Will need ischemic evaluation once prognosis is determined by pulm/oncology.     Right-sided hydronephrosis  S/p left nephrectomy many years ago  History of prostate cancer   Urology evaluated patient and planning for cystoscopy with retrograde pyelogram and possible stent placement of the right ureter and kidney on 2024.  However, procedure canceled due to patient going into the SVT.  Urology reports  patient on as needed IV metoprolol for persistent tachycardia.  Patient requiring supplemental oxygen again so will repeat CXR for possible repeat thoracentesis if needed.  CXR revealed increased bibasilar airspace disease which may resent atelectasis versus pneumonia.  Probable small right pleural effusion.  Persistent mild pulmonary edema     8/20  Patient seen and examined at bedside.  Still experiencing shortness of breath.  Denies any overnight issues.    Objective:     VITALS:   Last 24hrs VS reviewed since prior progress note. Most recent are:  Patient Vitals for the past 24 hrs:   BP Temp Temp src Pulse Resp SpO2   08/20/24 1101 97/69 98.2 °F (36.8 °C) -- 93 -- 100 %   08/20/24 1045 101/66 98.1 °F (36.7 °C) Oral 94 -- 100 %   08/20/24 0929 (!) 142/79 97.5 °F (36.4 °C) -- 97 -- 100 %   08/20/24 0619 -- -- -- (!) 101 18 98 %   08/20/24 0300 (!) 145/100 99.1 °F (37.3 °C) Oral (!) 110 22 100 %   08/19/24 2350 (!) 148/99 98.6 °F (37 °C) Axillary (!) 101 18 100 %   08/19/24 2012 134/85 97.9 °F (36.6 °C) Axillary 95 20 100 %   08/19/24 1431 (!) 151/103 98.4 °F (36.9 °C) Oral 96 -- 99 %         Intake/Output Summary (Last 24 hours) at 8/20/2024 1228  Last data filed at 8/20/2024 0523  Gross per 24 hour   Intake 156.95 ml   Output 1400 ml   Net -1243.05 ml        I had a face to face encounter and independently examined this patient on 8/20/2024, as outlined below:  PHYSICAL EXAM:  General: Alert, cooperative  EENT:  EOMI. Anicteric sclerae.  Resp:  CTA bilaterally, no wheezing or rales.  No accessory muscle use  CV:  Regular  rhythm,  No edema  GI:  Soft, Non distended, Non tender.  +Bowel sounds  Neurologic:  Alert and oriented X 3, normal speech,   Psych:   Good insight. Not anxious nor agitated  Skin:  No rashes.  No jaundice    Reviewed most current lab test results and cultures  YES  Reviewed most current radiology test results   YES  Review and summation of old records today    NO  Reviewed patient's current

## 2024-08-20 NOTE — PLAN OF CARE
PHYSICAL THERAPY TREATMENT     Patient: Abel Cruz (83 y.o. male)  Date: 8/20/2024  Diagnosis: Hyperkalemia [E87.5]  Pleural effusion [J90]  NETO (acute kidney injury) (HCC) [N17.9] Pleural effusion  Procedure(s) (LRB):  CYSTOSCOPY, RETROGRADE PYELOGRAM, URETERAL STENT INSERTION (Right) 6 Days Post-Op  Precautions: Fall Risk                      Recommendations for nursing mobility: Out of bed to chair for meals    In place during session: Nasal Cannula  L, Kim Catheter, and EKG/telemetry   Chart, physical therapy assessment, plan of care and goals were reviewed.  ASSESSMENT  Patient continues with skilled PT services and is progressing towards goals. Pt sitting up in recliner chair upon PT arrival, agreeable to session. (See below for objective details and assist levels).     Overall pt tolerated session fair today. Pt performed seated LE therex with no complaints of pain or discomfort. OT had just recently gotten pt up into recliner chair, so pt declined further mobility. Pt left sitting up in recliner chair with all needs met. Will continue to benefit from skilled PT services, and will continue to progress as tolerated. Potential barriers for safe discharge:. Current PT DC recommendation Inpatient Rehabilitation Facility  once medically appropriate.    GOALS:  Problem: Physical Therapy - Adult  Goal: By Discharge: Performs mobility at highest level of function for planned discharge setting.  See evaluation for individualized goals.  Description: FUNCTIONAL STATUS PRIOR TO ADMISSION: Patient was independent and active without use of DME.    HOME SUPPORT PRIOR TO ADMISSION: The patient lived alone with friends and neighbors to provide assistance with some IADL tasks.    Physical Therapy Goals  Initiated 8/18/2024  Pt stated goal: Get better  Pt will be I with LE HEP in 7 days.  Pt will perform bed mobility with Amelia in 7 days.  Pt will perform transfers with Minimal Assist in 7 days.   Pt will  amb 25 feet with LRAD safely with Minimal Assist in 7 days.  Pt will demonstrate improvement in static standing balance from Moderate Assist to Contact Guard Assist in 7 days.     Outcome: Progressing     PLAN :  Patient continues to benefit from skilled intervention to address functional impairments. Continue treatment per established plan of care to address goals.    Recommendation for discharge: (in order for the patient to meet his/her long term goals)  Inpatient Rehabilitation Facility     IF patient discharges home will need the following DME:continuing to assess with progress     SUBJECTIVE:   Patient stated “I'm here.”    OBJECTIVE DATA SUMMARY:   Critical Behavior:  Orientation  Overall Orientation Status: Within Normal Limits  Orientation Level: Oriented X4  Cognition  Overall Cognitive Status: WFL  Functional Mobility Training:  Bed Mobility:  Bed Mobility Training  Bed Mobility Training: No  Supine to Sit: Contact-guard assistance;Additional time;Assist X1;Adaptive equipment  Scooting: Contact-guard assistance;Additional time;Adaptive equipment;Assist X1  Transfers:  Transfer Training  Transfer Training: No  Interventions: Safety awareness training;Verbal cues;Tactile cues  Sit to Stand: Moderate assistance;Additional time;Assist X1  Stand to Sit: Moderate assistance;Additional time;Assist X1  Stand Pivot Transfers: Moderate assistance;Maximum assistance;Assist X1;Additional time  Bed to Chair: Moderate assistance;Maximum assistance;Assist X1  Balance:  Balance  Sitting: Impaired  Sitting - Static: Fair (occasional)  Sitting - Dynamic: Fair (occasional)  Standing: Impaired  Standing - Static: Constant support;Fair  Standing - Dynamic: Constant support;Poor  Wheelchair Mobility:  Wheelchair Management  Wheelchair Management: No  Ambulation/Gait Training:  Gait  Gait Training: No    Therapeutic Exercises:     EXERCISE   Sets   Reps   Active Active Assist   Passive Self ROM   Comments   Ankle Pumps  12 [x]

## 2024-08-20 NOTE — PROGRESS NOTES
Thoracentesis completed this am.     CM called and left another message for return [facility] to [facility] of patient at discharge. Updated clinicals faxed to the [facility] at [phone].     Discharge recommendations are for IRF. Referral sent to [facility], but requires [facility] from the  [facility].     1108 am  CM received call back from Ny and she says they have not received transfer paperwork to the [facility]. CM will complete paperwork and send to [facility].     1150 am  [facility] transfer [facility] the [facility] with successful confirmation of fax. [facility] will need to look over paperwork and if in agreement with IRF decision, they will try to find [facility] bed at the [facility], otherwise they will contract out to [facility] or [facility].     CM awaiting return call from [facility]

## 2024-08-21 LAB
ALBUMIN SERPL-MCNC: 1.9 G/DL (ref 3.5–5)
ANION GAP SERPL CALC-SCNC: 8 MMOL/L (ref 5–15)
BASOPHILS # BLD: 0 K/UL (ref 0–0.1)
BASOPHILS NFR BLD: 0 % (ref 0–1)
BUN SERPL-MCNC: 68 MG/DL (ref 6–20)
BUN/CREAT SERPL: 21 (ref 12–20)
CA-I BLD-MCNC: 8.7 MG/DL (ref 8.5–10.1)
CHLORIDE SERPL-SCNC: 107 MMOL/L (ref 97–108)
CO2 SERPL-SCNC: 22 MMOL/L (ref 21–32)
CREAT SERPL-MCNC: 3.21 MG/DL (ref 0.7–1.3)
CYTOLOGY-NON GYN: NORMAL
DIFFERENTIAL METHOD BLD: ABNORMAL
EOSINOPHIL # BLD: 0.2 K/UL (ref 0–0.4)
EOSINOPHIL NFR BLD: 3 % (ref 0–7)
ERYTHROCYTE [DISTWIDTH] IN BLOOD BY AUTOMATED COUNT: 17 % (ref 11.5–14.5)
FUNGITELL INTERPRETATION: NORMAL
FUNGITELL: <31.25 PG/ML
GALACTOMANNAN AG SPEC IA-ACNC: 0.04 INDEX (ref 0–0.49)
GLUCOSE SERPL-MCNC: 121 MG/DL (ref 65–100)
HCT VFR BLD AUTO: 20.6 % (ref 36.6–50.3)
HCT VFR BLD AUTO: 26.6 % (ref 36.6–50.3)
HGB BLD-MCNC: 6.5 G/DL (ref 12.1–17)
HGB BLD-MCNC: 8.6 G/DL (ref 12.1–17)
IMM GRANULOCYTES # BLD AUTO: 0.1 K/UL (ref 0–0.04)
IMM GRANULOCYTES NFR BLD AUTO: 1 % (ref 0–0.5)
LYMPHOCYTES # BLD: 0.3 K/UL (ref 0.8–3.5)
LYMPHOCYTES NFR BLD: 5 % (ref 12–49)
Lab: NORMAL
MAGNESIUM SERPL-MCNC: 1.7 MG/DL (ref 1.6–2.4)
MCH RBC QN AUTO: 31 PG (ref 26–34)
MCHC RBC AUTO-ENTMCNC: 31.6 G/DL (ref 30–36.5)
MCV RBC AUTO: 98.1 FL (ref 80–99)
MONOCYTES # BLD: 1.2 K/UL (ref 0–1)
MONOCYTES NFR BLD: 20 % (ref 5–13)
NEUTS SEG # BLD: 4.2 K/UL (ref 1.8–8)
NEUTS SEG NFR BLD: 71 % (ref 32–75)
NRBC # BLD: 0 K/UL (ref 0–0.01)
NRBC BLD-RTO: 0 PER 100 WBC
PHOSPHATE SERPL-MCNC: 4.6 MG/DL (ref 2.6–4.7)
PLATELET # BLD AUTO: 201 K/UL (ref 150–400)
PMV BLD AUTO: 10 FL (ref 8.9–12.9)
POTASSIUM SERPL-SCNC: 5 MMOL/L (ref 3.5–5.1)
RBC # BLD AUTO: 2.1 M/UL (ref 4.1–5.7)
RBC MORPH BLD: ABNORMAL
REFERENCE VALUE: NORMAL
RESULT: NEGATIVE
SODIUM SERPL-SCNC: 137 MMOL/L (ref 136–145)
WBC # BLD AUTO: 6 K/UL (ref 4.1–11.1)

## 2024-08-21 PROCEDURE — 6370000000 HC RX 637 (ALT 250 FOR IP): Performed by: INTERNAL MEDICINE

## 2024-08-21 PROCEDURE — 36415 COLL VENOUS BLD VENIPUNCTURE: CPT

## 2024-08-21 PROCEDURE — 2580000003 HC RX 258: Performed by: INTERNAL MEDICINE

## 2024-08-21 PROCEDURE — 86900 BLOOD TYPING SEROLOGIC ABO: CPT

## 2024-08-21 PROCEDURE — 85025 COMPLETE CBC W/AUTO DIFF WBC: CPT

## 2024-08-21 PROCEDURE — 83735 ASSAY OF MAGNESIUM: CPT

## 2024-08-21 PROCEDURE — 6370000000 HC RX 637 (ALT 250 FOR IP)

## 2024-08-21 PROCEDURE — 80069 RENAL FUNCTION PANEL: CPT

## 2024-08-21 PROCEDURE — 86901 BLOOD TYPING SEROLOGIC RH(D): CPT

## 2024-08-21 PROCEDURE — 2060000000 HC ICU INTERMEDIATE R&B

## 2024-08-21 PROCEDURE — 85014 HEMATOCRIT: CPT

## 2024-08-21 PROCEDURE — 36430 TRANSFUSION BLD/BLD COMPNT: CPT

## 2024-08-21 PROCEDURE — 86850 RBC ANTIBODY SCREEN: CPT

## 2024-08-21 PROCEDURE — 86923 COMPATIBILITY TEST ELECTRIC: CPT

## 2024-08-21 PROCEDURE — P9016 RBC LEUKOCYTES REDUCED: HCPCS

## 2024-08-21 PROCEDURE — 97530 THERAPEUTIC ACTIVITIES: CPT

## 2024-08-21 PROCEDURE — 2700000000 HC OXYGEN THERAPY PER DAY

## 2024-08-21 PROCEDURE — 6360000002 HC RX W HCPCS: Performed by: INTERNAL MEDICINE

## 2024-08-21 PROCEDURE — 6370000000 HC RX 637 (ALT 250 FOR IP): Performed by: STUDENT IN AN ORGANIZED HEALTH CARE EDUCATION/TRAINING PROGRAM

## 2024-08-21 PROCEDURE — 94761 N-INVAS EAR/PLS OXIMETRY MLT: CPT

## 2024-08-21 PROCEDURE — 85018 HEMOGLOBIN: CPT

## 2024-08-21 RX ORDER — BACLOFEN 10 MG/1
5 TABLET ORAL 3 TIMES DAILY PRN
Status: DISCONTINUED | OUTPATIENT
Start: 2024-08-21 | End: 2024-08-28 | Stop reason: HOSPADM

## 2024-08-21 RX ORDER — SODIUM CHLORIDE 9 MG/ML
INJECTION, SOLUTION INTRAVENOUS PRN
Status: DISCONTINUED | OUTPATIENT
Start: 2024-08-21 | End: 2024-08-28 | Stop reason: HOSPADM

## 2024-08-21 RX ADMIN — LORAZEPAM 1 MG: 1 TABLET ORAL at 22:50

## 2024-08-21 RX ADMIN — ATORVASTATIN CALCIUM 20 MG: 20 TABLET, FILM COATED ORAL at 08:44

## 2024-08-21 RX ADMIN — PRIMIDONE 100 MG: 50 TABLET ORAL at 20:28

## 2024-08-21 RX ADMIN — PRIMIDONE 100 MG: 50 TABLET ORAL at 08:44

## 2024-08-21 RX ADMIN — DOXYCYCLINE HYCLATE 100 MG: 100 CAPSULE ORAL at 20:29

## 2024-08-21 RX ADMIN — METOCLOPRAMIDE 10 MG: 10 TABLET ORAL at 06:51

## 2024-08-21 RX ADMIN — SODIUM CHLORIDE, PRESERVATIVE FREE 10 ML: 5 INJECTION INTRAVENOUS at 20:29

## 2024-08-21 RX ADMIN — CARVEDILOL 25 MG: 12.5 TABLET, FILM COATED ORAL at 16:09

## 2024-08-21 RX ADMIN — DOXYCYCLINE HYCLATE 100 MG: 100 CAPSULE ORAL at 08:44

## 2024-08-21 RX ADMIN — METOCLOPRAMIDE 10 MG: 10 TABLET ORAL at 16:12

## 2024-08-21 RX ADMIN — HEPARIN SODIUM 5000 UNITS: 5000 INJECTION INTRAVENOUS; SUBCUTANEOUS at 06:51

## 2024-08-21 RX ADMIN — SODIUM BICARBONATE 1300 MG: 650 TABLET ORAL at 20:28

## 2024-08-21 RX ADMIN — SODIUM BICARBONATE 1300 MG: 650 TABLET ORAL at 08:44

## 2024-08-21 RX ADMIN — CARVEDILOL 25 MG: 12.5 TABLET, FILM COATED ORAL at 08:44

## 2024-08-21 RX ADMIN — FOLIC ACID 1 MG: 1 TABLET ORAL at 08:44

## 2024-08-21 RX ADMIN — SODIUM CHLORIDE, PRESERVATIVE FREE 10 ML: 5 INJECTION INTRAVENOUS at 08:45

## 2024-08-21 RX ADMIN — BACLOFEN 5 MG: 10 TABLET ORAL at 20:29

## 2024-08-21 RX ADMIN — MEGESTROL ACETATE 400 MG: 40 SUSPENSION ORAL at 08:44

## 2024-08-21 RX ADMIN — METOCLOPRAMIDE 10 MG: 10 TABLET ORAL at 10:41

## 2024-08-21 RX ADMIN — EMPAGLIFLOZIN 10 MG: 10 TABLET, FILM COATED ORAL at 11:46

## 2024-08-21 RX ADMIN — CEFTRIAXONE SODIUM 2000 MG: 2 INJECTION, POWDER, FOR SOLUTION INTRAMUSCULAR; INTRAVENOUS at 12:43

## 2024-08-21 RX ADMIN — Medication 5 MG: at 20:28

## 2024-08-21 NOTE — PLAN OF CARE
Problem: Discharge Planning  Goal: Discharge to home or other facility with appropriate resources  Outcome: Progressing     Problem: Pain  Goal: Verbalizes/displays adequate comfort level or baseline comfort level  Outcome: Progressing     Problem: Skin/Tissue Integrity  Goal: Absence of new skin breakdown  Description: 1.  Monitor for areas of redness and/or skin breakdown  2.  Assess vascular access sites hourly  3.  Every 4-6 hours minimum:  Change oxygen saturation probe site  4.  Every 4-6 hours:  If on nasal continuous positive airway pressure, respiratory therapy assess nares and determine need for appliance change or resting period.  Outcome: Progressing     Problem: ABCDS Injury Assessment  Goal: Absence of physical injury  Outcome: Progressing     Problem: Safety - Adult  Goal: Free from fall injury  Outcome: Progressing     Problem: Respiratory - Adult  Goal: Achieves optimal ventilation and oxygenation  Outcome: Progressing     Problem: Skin/Tissue Integrity - Adult  Goal: Skin integrity remains intact  Outcome: Progressing  Goal: Incisions, wounds, or drain sites healing without S/S of infection  Outcome: Progressing  Goal: Oral mucous membranes remain intact  Outcome: Progressing     Problem: Genitourinary - Adult  Goal: Urinary catheter remains patent  Outcome: Progressing     Problem: Metabolic/Fluid and Electrolytes - Adult  Goal: Electrolytes maintained within normal limits  Outcome: Progressing  Goal: Hemodynamic stability and optimal renal function maintained  Outcome: Progressing     Problem: Neurosensory - Adult  Goal: Achieves stable or improved neurological status  Outcome: Progressing  Goal: Absence of seizures  Outcome: Progressing  Goal: Remains free of injury related to seizures activity  Outcome: Progressing     Problem: Cardiovascular - Adult  Goal: Maintains optimal cardiac output and hemodynamic stability  Outcome: Progressing  Goal: Absence of cardiac dysrhythmias or at  Constant observation Constant observation Constant observation

## 2024-08-21 NOTE — CARE COORDINATION
CM reviewed clinical chart.  Patient is being recommended for IRF.  CM called [facility] with the [facility].  [phone] extension [phone].  No answer to telephone, CM left detailed message requesting a return telephone call requesting and update on IRF placement for patient.

## 2024-08-21 NOTE — PROGRESS NOTES
Hospitalist Progress Note    NAME:   Abel Cruz   : 1940   MRN: [id number]     Date/Time: 2024 4:05 PM  Patient PCP: Jeremy Grimes MD    Estimated discharge date: 48 hours  Barriers: Pulmonary and oncology clearance      Assessment / Plan:    Abnormal CT of the chest concerning for malignancy  Lung nodules  Right-sided pleural effusion  Acute hypoxic respiratory failure secondary to above, resolved, on room air  Pulmonology eval to patient and fill this could be metastatic disease from colon/kidney.  However, pulmonary arteries are tiny and not amenable for bronchoscopy, biopsy or CT-guided biopsy.  Will follow pleural fluid studies from right-sided thoracentesis to determine nature of pleural fluid.  Due to pleural cytology being negative pulmonology recommending outpatient PET scan for further evaluation of possible malignancy  -IR complete right-sided thoracentesis removing 1500 cc of thin red fluid from right posterior chest. Pleural fluid collected from a thoracentesis revealed no cells diagnostic for malignancy  -S/P repeat thoracentesis on  with 1.65 L of neutrophil predominant exudate, pending labs.  -Ciprofloxacin was switched to Rocephin and doxycycline by pulmonary team     Acute A-fib with controlled rate  SVT  Episodes of SVT and increased Coreg to 25 mg twice daily.    -Echo completed revealed severely reduced left ventricular systolic function with a EF of 30 to 35%.  Moderately increased wall thickness.  Global hypokinesis.  Grade 1 diastolic dysfunction with normal LAP.  -Cardiology consulted and following.  Will need ischemic evaluation once prognosis is determined by pulm/oncology.     Right-sided hydronephrosis  S/p left nephrectomy many years ago  History of prostate cancer   Urology evaluated patient and planning for cystoscopy with retrograde pyelogram and possible stent placement of the right ureter and kidney on 2024.  However, procedure canceled due to  amenable to image guided percutaneous biopsy.  Moderately large right pleural effusion with associated right lower lobe atelectasis.  Postoperative changes from remote left nephrectomy.  Pulmonology consulted for right pleural effusion and oncology for concerns of malignancy.  Also, consult urology for right hydronephrosis with history of left nephrectomy and nephrology for evaluation of severe metabolic acidosis with history of CKD.  Nephrology about a patient and recommended providing patient with 1 amp of sodium bicarb now with addition of sodium bicarb tablets 13 mg twice daily and sodium bicarb drip and D5 at 75 cc/h.  Also, recommended placing Kim catheter until urology can evaluate for hydroureteronephrosis of the right kidney.  Urology evaluate patient and planning for cystoscopy with retrograde pyelogram and possible stent placement of the right ureter and kidney on 8/14/2024.  Pulmonology about a patient recommended right sided thoracentesis, which was complete on 8/12/2024 by IR removing 1500 cc of thin red fluid from the right posterior chest.  S/p thoracentesis CXR revealed no pneumothorax.  Heme-onc evaluated patient and appears patient is active with [facility] with Dr. Sue which ordered CT of the chest for malignancy concerns.  Patient already receiving treatment for his iron deficiency with intermittent IV iron.  SPEP without monoclonal protein.  Patient lives in [place] and prefers to continue outpatient care there.  Patient will need outpatient PET scan. Patient was scheduled for cystoscopy with urology today.  However, rapid response called on 8/14/2024 as patient was sustaining heart rates in the 180s.  Evaluated patient at bedside and he denied any chest pain on initial evaluation so ordered stat EKG and provided patient with 10 mg of metoprolol IV.  However, shortly after patient began complaining of chest pain so ordered stat troponin with repeat in 90 minutes.  EKG reviewed and patient

## 2024-08-21 NOTE — CARE COORDINATION
GIOVANNA received telephone call from [facility] with [facility] [phone] ext [phone]. She stated she needs the [facility] transfer form to indicate patient needs Acute Rehab.  CM corrected [facility] transfer form and refaxed to [phone].  [facility] will need to [facility]F for patient.

## 2024-08-21 NOTE — PROGRESS NOTES
Hematology/Oncology   Progress Note    Patient: Abel Cruz MRN: [id number]     YOB: 1940  Age: 83 y.o.  Sex: male      Admit Date: 8/11/2024    LOS: 10 days     Reason for consult: anemia and rule out malignancy     Subjective:     Patient resting in bed, no acute distress. Patient had repeat thoracentesis for 1650 cc removed from the right posterior chest yesterday, cytology pending. Hemoglobin decreased today to 6.5, will need 1 unit PRBC.    Review of Systems:   Constitutional No fevers, chills, night sweats   Hematologic/Lymphatic No easy bruising or bleeding.     Respiratory No cough or hemoptysis.   Cardiovascular No anginal chest pain   Gastrointestinal No nausea, vomiting, diarrhea, constipation   Genitourinary (M) No hematuria, dysuria   Neurologic No headache, blurred vision     Current Facility-Administered Medications:     albuterol (PROVENTIL) nebulizer solution 2.5 mg, 2.5 mg, Nebulization, Q4H PRN, Partha Arceo MD, 2.5 mg at 08/20/24 0619    cefTRIAXone (ROCEPHIN) 2,000 mg in sterile water 20 mL IV syringe, 2,000 mg, IntraVENous, Q24H, Kelechi Armenta DO, 2,000 mg at 08/20/24 1414    doxycycline hyclate (VIBRAMYCIN) capsule 100 mg, 100 mg, Oral, BID, Kelechi Armenta DO, 100 mg at 08/21/24 0844    sodium bicarbonate tablet 1,300 mg, 1,300 mg, Oral, BID, Karina Robles APRN - CNP, 1,300 mg at 08/21/24 0844    metoprolol (LOPRESSOR) injection 2.5 mg, 2.5 mg, IntraVENous, Q1H PRN, Ambrosio Burns PA-C, 2.5 mg at 08/19/24 1806    carvedilol (COREG) tablet 25 mg, 25 mg, Oral, BID CORAL, Werner Bravo APRN - NP, 25 mg at 08/21/24 0844    LORazepam (ATIVAN) tablet 1 mg, 1 mg, Oral, Q6H PRN, Osmani Lieberman MD, 1 mg at 08/20/24 0125    heparin (porcine) injection 5,000 Units, 5,000 Units, SubCUTAneous, 3 times per day, Osmani Lieberman MD, 5,000 Units at 08/21/24 0651    metoclopramide (REGLAN) tablet 10 mg, 10 mg, Oral, TID AC, Claus Yanes PA-C, 10 mg at 08/21/24 0651

## 2024-08-21 NOTE — PROGRESS NOTES
IntraVENous Q24H    doxycycline hyclate (VIBRAMYCIN) capsule 100 mg  100 mg Oral BID    sodium bicarbonate tablet 1,300 mg  1,300 mg Oral BID    metoprolol (LOPRESSOR) injection 2.5 mg  2.5 mg IntraVENous Q1H PRN    carvedilol (COREG) tablet 25 mg  25 mg Oral BID WC    LORazepam (ATIVAN) tablet 1 mg  1 mg Oral Q6H PRN    heparin (porcine) injection 5,000 Units  5,000 Units SubCUTAneous 3 times per day    metoclopramide (REGLAN) tablet 10 mg  10 mg Oral TID AC    melatonin tablet 5 mg  5 mg Oral Nightly PRN    epoetin paul-epbx (RETACRIT) injection 10,000 Units  10,000 Units SubCUTAneous Weekly    folic acid (FOLVITE) tablet 1 mg  1 mg Oral Daily    sodium chloride flush 0.9 % injection 5-40 mL  5-40 mL IntraVENous 2 times per day    sodium chloride flush 0.9 % injection 5-40 mL  5-40 mL IntraVENous PRN    0.9 % sodium chloride infusion   IntraVENous PRN    ondansetron (ZOFRAN-ODT) disintegrating tablet 4 mg  4 mg Oral Q8H PRN    Or    ondansetron (ZOFRAN) injection 4 mg  4 mg IntraVENous Q6H PRN    polyethylene glycol (GLYCOLAX) packet 17 g  17 g Oral Daily PRN    acetaminophen (TYLENOL) tablet 650 mg  650 mg Oral Q6H PRN    Or    acetaminophen (TYLENOL) suppository 650 mg  650 mg Rectal Q6H PRN    atorvastatin (LIPITOR) tablet 20 mg  20 mg Oral Daily    megestrol (MEGACE) 40 MG/ML suspension 400 mg  400 mg Oral Daily    primidone (MYSOLINE) tablet 100 mg  100 mg Oral BID        Exam:    General:  Alert, cooperative, thinly built.  Looks frail.     Eyes:  Sclera anicteric. Pupils equally round and reactive to light.   Mouth/Throat: Mucous membranes normal, oral pharynx clear   Neck: Supple, has lipomatous growths in the anterior cervical area on the right side   Lungs:   Improved air entry at right lower half of the chest.    No wheezing was appreciated.  Currently on room air.   CV:  Regular rate and rhythm,no murmur, click, rub or gallop   Abdomen:   Soft, non-tender. bowel sounds normal. non-distended, scaphoid  making, and answering questions.    Kelechi Armenta DO  [facility] ([facility])     This dictation was done by dragon, computer voice recognition software.  Often unanticipated grammatical, syntax, [place] phones and other interpretive errors are inadvertently transcribed.  Please excuse errors that have escaped final proofreading.

## 2024-08-21 NOTE — PROGRESS NOTES
4 Eyes Skin Assessment     NAME:  Abel Cruz  YOB: 1940  MEDICAL RECORD NUMBER:  [id number]    The patient is being assessed for  Other weekly assessment    I agree that at least one RN has performed a thorough Head to Toe Skin Assessment on the patient. ALL assessment sites listed below have been assessed.      Areas assessed by both nurses:    Head, Face, Ears, Shoulders, Back, Chest, Arms, Elbows, Hands, Sacrum. Buttock, Coccyx, Ischium, Legs. Feet and Heels, Under Medical Devices , and Other .        Does the Patient have a Wound? No noted wound(s)  thoracentesis incision on his right upper back       Raymon Prevention initiated by RN: Yes  Wound Care Orders initiated by RN: No    Pressure Injury (Stage 3,4, Unstageable, DTI, NWPT, and Complex wounds) if present, place Wound referral order by RN under : No    New Ostomies, if present place, Ostomy referral order under : No     Nurse 1 eSignature: Electronically signed by Sarah Zuniga RN on 8/21/24 at 3:29 AM EDT    **SHARE this note so that the co-signing nurse can place an eSignature**    Nurse 2 eSignature: Electronically signed by Rosie Little RN on 8/21/24 at 4:27 AM EDT

## 2024-08-21 NOTE — PROGRESS NOTES
NAME:  Abel Cruz   :   1940   MRN:   [id number]     ATTENDING: Nils Malik Cha, MD  PCP:  Jeremy Grimes MD    Date/Time:  2024       Subjective:     Patient was seen at bedside, reports hiccups, otherwise denies shortness of breath, on RA.    Creatinine slightly increased to 3.2 today.    Past Medical History:   Diagnosis Date    Hypertension       Past Surgical History:   Procedure Laterality Date    KIDNEY REMOVAL      over 20 years ago     Social History     Tobacco Use    Smoking status: Former     Types: Cigarettes    Smokeless tobacco: Not on file   Substance Use Topics    Alcohol use: Not on file      History reviewed. No pertinent family history.    Allergies   Allergen Reactions    Naproxen Other (See Comments)     Gastric Ulcer With Hemorrhage.    Lisinopril Nausea And Vomiting    Morphine Rash      Prior to Admission medications    Medication Sig Start Date End Date Taking? Authorizing Provider   latanoprost (XALATAN) 0.005 % ophthalmic solution Place 1 drop into both eyes nightly   Yes Mitesh Daily MD   Calcium Citrate-Vitamin D (SM CALCIUM CITRATE W/VIT D3 PO) Take 1,000 mg by mouth Daily   Yes Mitesh Daily MD   lidocaine (BLUE-EMU PAIN RELIEF DRY) 4 % external patch Place 1 patch onto the skin daily   Yes Mitesh Daily MD   ferrous sulfate (IRON 325) 325 (65 Fe) MG tablet Take 1 tablet by mouth daily (with breakfast)   Yes Mitesh Daily MD   atorvastatin (LIPITOR) 40 MG tablet Take 0.5 tablets by mouth daily 24  Yes Mitesh Daily MD   cyanocobalamin 500 MCG tablet Take 2 tablets by mouth daily   Yes Mitesh Daily MD   hydrALAZINE (APRESOLINE) 25 MG tablet Take 1 tablet by mouth 3 times daily   Yes Mitesh Daily MD   megestrol (MEGACE) 40 MG/ML suspension Take 10 mLs by mouth daily 24  Yes Mitesh Daily MD   primidone (MYSOLINE) 50 MG tablet Take 150 tablets by mouth 3 times daily 22  Yes [place]  agree with assessment and plan    Dr. Ross Padgett

## 2024-08-21 NOTE — PLAN OF CARE
PHYSICAL THERAPY TREATMENT     Patient: Abel Cruz (83 y.o. male)  Date: 8/21/2024  Diagnosis: Hyperkalemia [E87.5]  Pleural effusion [J90]  NETO (acute kidney injury) (HCC) [N17.9] Pleural effusion  Procedure(s) (LRB):  CYSTOSCOPY, RETROGRADE PYELOGRAM, URETERAL STENT INSERTION (Right) 7 Days Post-Op  Precautions: Fall Risk                      Recommendations for nursing mobility: Out of bed to chair for meals, Encourage HEP in prep for ADLs/mobility; see handout for details, Use of BSC for toileting , AD and gt belt for bed to chair , and Assist x1    In place during session: EKG/telemetry   Chart, physical therapy assessment, plan of care and goals were reviewed.  ASSESSMENT  Patient continues with skilled PT services and is progressing towards goals. Pt supine in bed upon PT arrival, agreeable to session. (See below for objective details and assist levels).     Overall pt tolerated session fair today. Pt transferred to eob with cga/min A. Pt sat eob and demonstrated fair sitting balance. Pt stood from bed and min A and transferred to recliner chair with RW and cga/min. Pt sat in recliner chair and was left sitting up with all needs met. Pt given HEP handout and reviewed exercises on the sheet. Will continue to benefit from skilled PT services, and will continue to progress as tolerated. Potential barriers for safe discharge:. Current PT DC recommendation Inpatient Rehabilitation Facility  once medically appropriate.    GOALS:  Problem: Physical Therapy - Adult  Goal: By Discharge: Performs mobility at highest level of function for planned discharge setting.  See evaluation for individualized goals.  Description: FUNCTIONAL STATUS PRIOR TO ADMISSION: Patient was independent and active without use of DME.    HOME SUPPORT PRIOR TO ADMISSION: The patient lived alone with friends and neighbors to provide assistance with some IADL tasks.    Physical Therapy Goals  Initiated 8/18/2024  Pt stated goal: Get  Length: Right shortened;Left shortened  Gait Abnormalities: Decreased step clearance     Pain Ratin/10 reported    Activity Tolerance:   Fair     After treatment patient left in no apparent distress:   Bed locked and returned to lowest position, Patient left in no apparent distress sitting up in chair and Call bell within reach, and nsg updated     COMMUNICATION/COLLABORATION:   The patient’s plan of care was discussed with: Registered nurse    Patient Education  Education Given To: Patient  Education Provided: Role of Therapy;Plan of Care;Energy Conservation;Precautions;Transfer Training;Equipment;Home Exercise Program  Education Method: Verbal;Demonstration;Printed Information/Hand-outs  Education Outcome: Verbalized understanding;Demonstrated understanding;Continued education needed    Mena Deluna PTA  Minutes: 24

## 2024-08-21 NOTE — PROGRESS NOTES
CARDIOLOGY PROGRESS NOTE      Patient Name: Abel Cruz  Age: 83 y.o.  Gender:male  :1940  MRN: [id number]    Patient seen and examined. This is a patient with a history of hypertension, hyperlipidemia, tremors, CKD s/p left nephrectomy, prostate cancer, chronic anemia  who presented with pleuritic pain, cough now being followed for SVT.      remains in ICU. No recurrent SVT.  No chest pain or shortness of breath.  Denies palpitations.  No other complaints reported.     out of ICU, comfortable in recliner on tele. No chest pain or dyspnea.      sitting upright in bed. Family at bedside. Complains of anxiety. No chest pain or dyspnea.      repeat thoracentesis this AM, reports some discomfort at procedure site. Denies chest pain. Breathing improved from yesterday      sitting upright in bed. Denies chest pain. Breathing stable.     Telemetry reviewed, there were no events noted in the past 24 hours.    Pertinent review of systems items noted above, all other systems are negative. Current medications reviewed.    Physical Examination    Allergies   Allergen Reactions    Naproxen Other (See Comments)     Gastric Ulcer With Hemorrhage.    Lisinopril Nausea And Vomiting    Morphine Rash     Vitals:    24 0947   BP:    Pulse:    Resp:    Temp:    SpO2: 98%     Vital signs are stable  No apparent distress, frail, chronically ill appearing  Heart has a mildly tachycardic rate and regular rhythm.  no murmur  Lungs are diminished  Abdomen is soft, nontender, normal bowel sounds.  Extremities have no edema  Skin is dry and warm.  Normal affect    Labs reviewed:  Recent Results (from the past 12 hour(s))   CBC with Auto Differential    Collection Time: 24  4:50 AM   Result Value Ref Range    WBC 6.0 4.1 - 11.1 K/uL    RBC 2.10 (L) 4.10 - 5.70 M/uL    Hemoglobin 6.5 (L) 12.1 - 17.0 g/dL    Hematocrit 20.6 (L) 36.6 - 50.3 %    MCV 98.1 80.0 - 99.0 FL    MCH 31.0 26.0 - 34.0 PG    MCHC

## 2024-08-21 NOTE — CONSENT
Informed Consent for Blood Component Transfusion Note    I have discussed with the patient the rationale for blood component transfusion; its benefits in treating or preventing fatigue, organ damage, or death; and its risk which includes mild transfusion reactions, rare risk of blood borne infection, or more serious but rare reactions. I have discussed the alternatives to transfusion, including the risk and consequences of not receiving transfusion. The patient had an opportunity to ask questions and had agreed to proceed with transfusion of blood components.    Electronically signed by ESTEBAN Galdamez CNP on 8/21/24 at 11:00 AM EDT

## 2024-08-22 LAB
ABO + RH BLD: NORMAL
ALBUMIN SERPL-MCNC: 2 G/DL (ref 3.5–5)
ANION GAP SERPL CALC-SCNC: 6 MMOL/L (ref 5–15)
BASOPHILS # BLD: 0 K/UL (ref 0–0.1)
BASOPHILS NFR BLD: 0 % (ref 0–1)
BLD PROD TYP BPU: NORMAL
BLOOD BANK BLOOD PRODUCT EXPIRATION DATE: NORMAL
BLOOD BANK DISPENSE STATUS: NORMAL
BLOOD BANK ISBT PRODUCT BLOOD TYPE: 600
BLOOD BANK PRODUCT CODE: NORMAL
BLOOD BANK UNIT TYPE AND RH: NORMAL
BLOOD GROUP ANTIBODIES SERPL: NEGATIVE
BNP SERPL-MCNC: 4190 PG/ML
BPU ID: NORMAL
BUN SERPL-MCNC: 70 MG/DL (ref 6–20)
BUN/CREAT SERPL: 22 (ref 12–20)
CA-I BLD-MCNC: 9.2 MG/DL (ref 8.5–10.1)
CHLORIDE SERPL-SCNC: 107 MMOL/L (ref 97–108)
CO2 SERPL-SCNC: 24 MMOL/L (ref 21–32)
COLLECT DATE STL: NEGATIVE
CREAT SERPL-MCNC: 3.15 MG/DL (ref 0.7–1.3)
CROSSMATCH RESULT: NORMAL
CRP SERPL-MCNC: 21.2 MG/DL (ref 0–0.3)
DIFFERENTIAL METHOD BLD: ABNORMAL
EOSINOPHIL # BLD: 0.1 K/UL (ref 0–0.4)
EOSINOPHIL NFR BLD: 1 % (ref 0–7)
ERYTHROCYTE [DISTWIDTH] IN BLOOD BY AUTOMATED COUNT: 20.1 % (ref 11.5–14.5)
GLUCOSE SERPL-MCNC: 115 MG/DL (ref 65–100)
HCT VFR BLD AUTO: 27.2 % (ref 36.6–50.3)
HEMOCCULT SP1 STL QL: NEGATIVE
HGB BLD-MCNC: 8.8 G/DL (ref 12.1–17)
IMM GRANULOCYTES # BLD AUTO: 0 K/UL
IMM GRANULOCYTES NFR BLD AUTO: 0 %
LYMPHOCYTES # BLD: 0.4 K/UL (ref 0.8–3.5)
LYMPHOCYTES NFR BLD: 6 % (ref 12–49)
MAGNESIUM SERPL-MCNC: 1.6 MG/DL (ref 1.6–2.4)
MCH RBC QN AUTO: 29.8 PG (ref 26–34)
MCHC RBC AUTO-ENTMCNC: 32.4 G/DL (ref 30–36.5)
MCV RBC AUTO: 92.2 FL (ref 80–99)
MONOCYTES # BLD: 0.9 K/UL (ref 0–1)
MONOCYTES NFR BLD: 14 % (ref 5–13)
NEUTS SEG # BLD: 4.8 K/UL (ref 1.8–8)
NEUTS SEG NFR BLD: 79 % (ref 32–75)
NRBC # BLD: 0 K/UL (ref 0–0.01)
NRBC BLD-RTO: 0 PER 100 WBC
PHOSPHATE SERPL-MCNC: 4.1 MG/DL (ref 2.6–4.7)
PLATELET # BLD AUTO: 184 K/UL (ref 150–400)
PMV BLD AUTO: 9.5 FL (ref 8.9–12.9)
POTASSIUM SERPL-SCNC: 4.9 MMOL/L (ref 3.5–5.1)
PROCALCITONIN SERPL-MCNC: 1.36 NG/ML
RBC # BLD AUTO: 2.95 M/UL (ref 4.1–5.7)
RBC MORPH BLD: ABNORMAL
SODIUM SERPL-SCNC: 137 MMOL/L (ref 136–145)
SPECIMEN EXP DATE BLD: NORMAL
TRANSFUSION STATUS PATIENT QL: NORMAL
UNIT DIVISION: 0
UNIT ISSUE DATE/TIME: NORMAL
WBC # BLD AUTO: 6.2 K/UL (ref 4.1–11.1)

## 2024-08-22 PROCEDURE — 6370000000 HC RX 637 (ALT 250 FOR IP)

## 2024-08-22 PROCEDURE — 97530 THERAPEUTIC ACTIVITIES: CPT

## 2024-08-22 PROCEDURE — 2580000003 HC RX 258: Performed by: INTERNAL MEDICINE

## 2024-08-22 PROCEDURE — 6370000000 HC RX 637 (ALT 250 FOR IP): Performed by: INTERNAL MEDICINE

## 2024-08-22 PROCEDURE — 6360000002 HC RX W HCPCS: Performed by: INTERNAL MEDICINE

## 2024-08-22 PROCEDURE — 86140 C-REACTIVE PROTEIN: CPT

## 2024-08-22 PROCEDURE — 80069 RENAL FUNCTION PANEL: CPT

## 2024-08-22 PROCEDURE — 85025 COMPLETE CBC W/AUTO DIFF WBC: CPT

## 2024-08-22 PROCEDURE — 84145 PROCALCITONIN (PCT): CPT

## 2024-08-22 PROCEDURE — 83880 ASSAY OF NATRIURETIC PEPTIDE: CPT

## 2024-08-22 PROCEDURE — 6370000000 HC RX 637 (ALT 250 FOR IP): Performed by: STUDENT IN AN ORGANIZED HEALTH CARE EDUCATION/TRAINING PROGRAM

## 2024-08-22 PROCEDURE — 82272 OCCULT BLD FECES 1-3 TESTS: CPT

## 2024-08-22 PROCEDURE — 2060000000 HC ICU INTERMEDIATE R&B

## 2024-08-22 PROCEDURE — 83735 ASSAY OF MAGNESIUM: CPT

## 2024-08-22 PROCEDURE — 36415 COLL VENOUS BLD VENIPUNCTURE: CPT

## 2024-08-22 RX ORDER — MAGNESIUM SULFATE IN WATER 40 MG/ML
2000 INJECTION, SOLUTION INTRAVENOUS ONCE
Status: COMPLETED | OUTPATIENT
Start: 2024-08-22 | End: 2024-08-22

## 2024-08-22 RX ORDER — SODIUM BICARBONATE 650 MG/1
650 TABLET ORAL 3 TIMES DAILY
Status: DISCONTINUED | OUTPATIENT
Start: 2024-08-22 | End: 2024-08-28

## 2024-08-22 RX ADMIN — EMPAGLIFLOZIN 10 MG: 10 TABLET, FILM COATED ORAL at 08:10

## 2024-08-22 RX ADMIN — DOXYCYCLINE HYCLATE 100 MG: 100 CAPSULE ORAL at 21:26

## 2024-08-22 RX ADMIN — SODIUM CHLORIDE, PRESERVATIVE FREE 10 ML: 5 INJECTION INTRAVENOUS at 08:10

## 2024-08-22 RX ADMIN — SODIUM BICARBONATE 650 MG: 650 TABLET ORAL at 13:05

## 2024-08-22 RX ADMIN — SODIUM BICARBONATE 650 MG: 650 TABLET ORAL at 21:26

## 2024-08-22 RX ADMIN — METOCLOPRAMIDE 10 MG: 10 TABLET ORAL at 10:35

## 2024-08-22 RX ADMIN — METOCLOPRAMIDE 10 MG: 10 TABLET ORAL at 06:22

## 2024-08-22 RX ADMIN — CEFTRIAXONE SODIUM 2000 MG: 2 INJECTION, POWDER, FOR SOLUTION INTRAMUSCULAR; INTRAVENOUS at 13:05

## 2024-08-22 RX ADMIN — FOLIC ACID 1 MG: 1 TABLET ORAL at 08:10

## 2024-08-22 RX ADMIN — PRIMIDONE 100 MG: 50 TABLET ORAL at 21:26

## 2024-08-22 RX ADMIN — CARVEDILOL 25 MG: 12.5 TABLET, FILM COATED ORAL at 08:09

## 2024-08-22 RX ADMIN — PRIMIDONE 100 MG: 50 TABLET ORAL at 08:10

## 2024-08-22 RX ADMIN — CARVEDILOL 25 MG: 12.5 TABLET, FILM COATED ORAL at 16:19

## 2024-08-22 RX ADMIN — METOCLOPRAMIDE 10 MG: 10 TABLET ORAL at 16:19

## 2024-08-22 RX ADMIN — ATORVASTATIN CALCIUM 20 MG: 20 TABLET, FILM COATED ORAL at 08:09

## 2024-08-22 RX ADMIN — DOXYCYCLINE HYCLATE 100 MG: 100 CAPSULE ORAL at 08:10

## 2024-08-22 RX ADMIN — MAGNESIUM SULFATE HEPTAHYDRATE 2000 MG: 40 INJECTION, SOLUTION INTRAVENOUS at 13:57

## 2024-08-22 RX ADMIN — SODIUM BICARBONATE 1300 MG: 650 TABLET ORAL at 08:10

## 2024-08-22 RX ADMIN — SODIUM CHLORIDE, PRESERVATIVE FREE 10 ML: 5 INJECTION INTRAVENOUS at 21:27

## 2024-08-22 RX ADMIN — MEGESTROL ACETATE 400 MG: 40 SUSPENSION ORAL at 08:09

## 2024-08-22 NOTE — PROGRESS NOTES
Hematology/Oncology   Progress Note    Patient: Abel Cruz MRN: [id number]     YOB: 1940  Age: 83 y.o.  Sex: male      Admit Date: 8/11/2024    LOS: 11 days     Reason for consult: anemia and rule out malignancy     Subjective:     Patient resting in bed, no acute distress. Patient had repeat thoracentesis for 1650 cc removed from the right posterior chest, cytology pending. [facility]. No new complaints today.    Review of Systems:   Constitutional No fevers, chills, night sweats   Hematologic/Lymphatic No easy bruising or bleeding.     Respiratory No cough or hemoptysis.   Cardiovascular No anginal chest pain   Gastrointestinal No nausea, vomiting, diarrhea, constipation   Genitourinary (M) No hematuria, dysuria   Neurologic No headache, blurred vision     Current Facility-Administered Medications:     empagliflozin (JARDIANCE) tablet 10 mg, 10 mg, Oral, Daily, Werner Bravo APRN - NP, 10 mg at 08/22/24 0810    0.9 % sodium chloride infusion, , IntraVENous, PRN, Ana Key APRN - CNP    baclofen (LIORESAL) tablet 5 mg, 5 mg, Oral, TID PRN, Karina Robles APRN - CNP, 5 mg at 08/21/24 2029    albuterol (PROVENTIL) nebulizer solution 2.5 mg, 2.5 mg, Nebulization, Q4H PRN, Partha Arceo MD, 2.5 mg at 08/20/24 0619    cefTRIAXone (ROCEPHIN) 2,000 mg in sterile water 20 mL IV syringe, 2,000 mg, IntraVENous, Q24H, Kelechi Armenta DO, 2,000 mg at 08/21/24 1243    doxycycline hyclate (VIBRAMYCIN) capsule 100 mg, 100 mg, Oral, BID, Kelechi Armenta DO, 100 mg at 08/22/24 0810    sodium bicarbonate tablet 1,300 mg, 1,300 mg, Oral, BID, Karina Robles APRN - CNP, 1,300 mg at 08/22/24 0810    metoprolol (LOPRESSOR) injection 2.5 mg, 2.5 mg, IntraVENous, Q1H PRN, Ambrosio Burns PA-C, 2.5 mg at 08/19/24 1806    carvedilol (COREG) tablet 25 mg, 25 mg, Oral, BID WC, Werner Bravo, APRN - NP, 25 mg at 08/22/24 0809    LORazepam (ATIVAN) tablet 1 mg, 1 mg, Oral, Q6H  A/P. Consider infectious etiology  -Repeat CT from 8/19 without any definite lung nodules  -pulmonology ordered repeat thoracentesis; completed 8/20- negative cytology     Right pleural effusion  -IR completed thoracentesis 8/12; cytology negative  -Pulmonology following; repeat thoracentesis completed 8/20  -Cytology from 8/20: negative     Anemia  -On chart review patient's HemOnc note states patient has a history of TOI and stage IV CKD  -Hgb currently improved at 8.8 g/dL, s/p 1 unit of pRBC 8/21  -Iron studies show deficiency completed supplementation, B12/Folate replete  -SPEP without monoclonal protein   -Continue to monitor for any bleeding, monitor CBC  -On Retacrit   -Transfuse for hemoglobin less than 7  -Reported Colonoscopy in 2022: unremarkable     Right hydronephrosis  -Uncertain cause, evaluated by urology and plan for cystoscopy and right retrograde pyelogram on 8/14 was canceled because of SVT   -urology stated follow up OP to discuss timing of diagnostic workup     Acute Kidney Injury   -History of left nephrectomy; nephrology following    Pleural fluid cytology x 2: negative. Lung nodules resolved.  Will sign off.  Signed By: Ana Key, ESTEBAN - CNP     August 22, 2024           I have seen, examined and evaluated the patient with Ana Key NP under my direct supervision. I have reviewed the note and agree with the assessment and plan of care as documented.   HPI, social history, family history, ROS, physical examination and management plan have been reviewed and verified.    Ponce Smith MD.

## 2024-08-22 NOTE — PROGRESS NOTES
NAME:  Abel Cruz   :   1940   MRN:   [id number]     ATTENDING: Nils Malik Cha, MD  PCP:  Jeremy Grimes MD    Date/Time:  2024       Subjective:     Patient was seen at bedside, resting with eyes closed, appears to be comfortable, on 1L NC.    Creatinine stable at 3.1 today.    Past Medical History:   Diagnosis Date    Hypertension       Past Surgical History:   Procedure Laterality Date    KIDNEY REMOVAL      over 20 years ago     Social History     Tobacco Use    Smoking status: Former     Types: Cigarettes    Smokeless tobacco: Not on file   Substance Use Topics    Alcohol use: Not on file      History reviewed. No pertinent family history.    Allergies   Allergen Reactions    Naproxen Other (See Comments)     Gastric Ulcer With Hemorrhage.    Lisinopril Nausea And Vomiting    Morphine Rash      Prior to Admission medications    Medication Sig Start Date End Date Taking? Authorizing Provider   latanoprost (XALATAN) 0.005 % ophthalmic solution Place 1 drop into both eyes nightly   Yes Mitesh Daily MD   Calcium Citrate-Vitamin D (SM CALCIUM CITRATE W/VIT D3 PO) Take 1,000 mg by mouth Daily   Yes Mitesh Daily MD   lidocaine (BLUE-EMU PAIN RELIEF DRY) 4 % external patch Place 1 patch onto the skin daily   Yes Mitesh Daily MD   ferrous sulfate (IRON 325) 325 (65 Fe) MG tablet Take 1 tablet by mouth daily (with breakfast)   Yes Mitesh Daily MD   atorvastatin (LIPITOR) 40 MG tablet Take 0.5 tablets by mouth daily 24  Yes Mitesh Daily MD   cyanocobalamin 500 MCG tablet Take 2 tablets by mouth daily   Yes Mitesh Daily MD   hydrALAZINE (APRESOLINE) 25 MG tablet Take 1 tablet by mouth 3 times daily   Yes Mitesh Daily MD   megestrol (MEGACE) 40 MG/ML suspension Take 10 mLs by mouth daily 24  Yes Mitesh Daily MD   primidone (MYSOLINE) 50 MG tablet Take 150 tablets by mouth 3 times daily 22  Yes Mitesh Daily MD

## 2024-08-22 NOTE — PROGRESS NOTES
Pulmonary Progress Note    Subjective:     Patient seen and examined in his room on the floor this afternoon, no acute events overnight.  Saturating well on room air, hemoglobin improved to 8.8 this morning from 6.5 yesterday following 1 unit of packed red blood cell transfusion.  Magnesium 1.6, creatinine down slightly to 3.15 today from 3.21 yesterday.  Giving 2 g IV magnesium sulfate, repeat electrolytes in the morning.  Pleural fluid culture/cytology still pending.  Repeat chest x-ray in the morning.     inpatient rehab at discharge.  Awaiting response from [facility].          Patient Active Problem List   Diagnosis    Pleural effusion    Hydronephrosis        Allergies   Allergen Reactions    Naproxen Other (See Comments)     Gastric Ulcer With Hemorrhage.    Lisinopril Nausea And Vomiting    Morphine Rash        Review of Systems:  A comprehensive review of systems was negative except for that written in the History of Present Illness.    Labs:    Recent Labs     24  1148 24  0450 24  1902 24  0356   WBC 6.1 6.0  --  6.2   HGB 7.4* 6.5* 8.6* 8.8*   HCT 23.3* 20.6* 26.6* 27.2*    201  --  184     Recent Labs     24  1148 24  0450 24  0356    137 137   K 4.7 5.0 4.9    107 107   CO2 19* 22 24   GLUCOSE 142* 121* 115*   BUN 60* 68* 70*   CREATININE 3.03* 3.21* 3.15*   CALCIUM 9.2 8.7 9.2   MG  --  1.7 1.6   PHOS 4.7 4.6 4.1           Objective:   Blood pressure 122/83, pulse 87, temperature 98.6 °F (37 °C), temperature source Oral, resp. rate 16, height 1.676 m (5' 6\"), weight 56.5 kg (124 lb 9 oz), SpO2 100%.  Temp (24hrs), Av.7 °F (37.1 °C), Min:98.6 °F (37 °C), Max:98.8 °F (37.1 °C)    IR GUIDED THORACENTESIS PLEURAL   Final Result   Technically successful ultrasound guided right thoracentesis yielding 1650 ml of   fluid.  A post procedure chest x-ray is pending.      Electronically signed by MILEY SENA      XR CHEST  in right-sided pleural effusion.    Of note, a CT chest was ordered on 8/19/2024 prior to the thoracentesis showing a large partially loculated right-sided pleural effusion without obvious pneumonia/nodules/edema.  This likely would have been more beneficial if this was ordered after the thoracentesis today.    Repeat chest x-ray in the morning.    PT/OT recommending inpatient rehab.    2.  Bilateral multiple pulmonary nodules/weight loss:  Could be metastatic disease from colon/kidney.  These pulmonary nodules are tiny and not amenable for bronchoscopy biopsy or CT-guided biopsy.   will require PET scan as an outpatient  Interestingly, CT chest from 8/19/2024 does not demonstrate any evidence of nodules.  May have been infectious/inflammatory.  Awaiting culture/cytology and repeat thoracentesis from 8/20/2024    3.  Right lower chest pain:  Seems to be from right-sided pleural effusion.  He is on painkillers Dilaudid.    4.  Acute renal failure:  Seems to be at least partially from volume contraction/dehydration.  He has not been eating well for the last couple of weeks.  On admission his BUN was 4.6 and creatinine was 91.  With hydration it is gradually improving.  Nephrology consult and input was appreciated.  With hydration renal functions are gradually improving although creatinine down slightly to 3.15 today from 3.21 yesterday    5.  SVT  Developed SVT.  Given IV Cardizem bolus and patient got started on p.o. metoprolol  Will need cardiac stress testing and workup.  Appreciate assistance from cardiology team    6.  Right hydronephrosis  Patient is status post left nephrectomy in the past.  Will get cystoscopy once cleared by cardiology.    Questions of patient were answered at bedside in detail  Case discussed in detail with RN, RT, and care team  Thank you for involving me in the care of this patient  I will follow with you closely during hospitalization    Time spent more than 30 minutes under patient care

## 2024-08-22 NOTE — PROGRESS NOTES
Patient lives at home alone and will need to go to rehab prior to returning home. Patient has agreed. CM has been in contact with [facility] and [facility]. Updated clinicals sent for patient to the [facility].

## 2024-08-22 NOTE — PLAN OF CARE
OCCUPATIONAL THERAPY TREATMENT  Patient: Abel Cruz (83 y.o. male)  Date: 8/22/2024  Primary Diagnosis: Hyperkalemia [E87.5]  Pleural effusion [J90]  NETO (acute kidney injury) (HCC) [N17.9]  Procedure(s) (LRB):  CYSTOSCOPY, RETROGRADE PYELOGRAM, URETERAL STENT INSERTION (Right) 8 Days Post-Op   Precautions: Fall Risk                Recommendations for nursing mobility: Out of bed to chair for meals, Encourage HEP in prep for ADLs/mobility; see handout for details, Use of bed/chair alarm for safety, AD and gt belt for bed to chair , Amb to bathroom with AD and gait belt, and Assist x1    In place during session: Nasal Cannula 2L, Kim Catheter, and EKG/telemetry   Chart, occupational therapy assessment, plan of care, and goals were reviewed.  ASSESSMENT  Patient continues with skilled OT services and is progressing towards goals. Pt semi supine upon COOMBS arrival, agreeable to session. Pt A&O x 4. Pt  just returned back to bed with nursing.  Pt agreeable to in bed activities.  Pt attempted to don briefs and slipper socks.  Pt unable flex knees or flex at hips far enough to don items over feet.  Pt participated in UE therex to increase strength and endurance to aid in ADL performance. Pt requires verbal, demonstration, and visual cues.(See below for objective details and assist levels). Pt left semi supine with all needs met.    Overall pt tolerated session fair today with in bed activities. Pt continues to benefit from skilled OT to increase strength, endurance, independence with basic self care and functional mobility/transfers. Potential barriers for safe discharge: pt is a high fall risk, pt is not safe to be alone, and concern for pt safely navigating or managing the home environment. Current OT recommendations for discharge Inpatient Rehabilitation Facility . Will continue to benefit from skilled OT services, and will continue to progress as tolerated.      Start of Session End of Session   SPO2 (%) 94 92  knees (pt didn't don to waist)    Therapeutic exercise:    Exercise Sets Reps AROM AAROM PROM Self PROM Comments   Shoulder flex/ext 1 5 [x] [] [] []    Elbow flex/ext 1 10 [x] [] [] []    Chest presses 1 10 [x] [] [] []       [] [] [] []      Pain Ratin/10   Pain Intervention(s):   pain is at a level acceptable to the patient    Activity Tolerance:   Fair     After treatment patient left in no apparent distress:   Bed locked and returned to lowest position, Patient left in no apparent distress in bed, Call bell within reach, Bed/ chair alarm activated, Side rails x3, and Updated patient's board on functional status and mobility recommendations, and nsg updated     COMMUNICATION/EDUCATION:   The patient’s plan of care was discussed with: Registered nurse    Patient Education  Education Given To: Patient  Education Provided: Plan of Care;Home Exercise Program  Education Method: Demonstration;Verbal;Teach Back  Barriers to Learning: None  Education Outcome: Verbalized understanding;Demonstrated understanding;Continued education needed    Thank you for this referral.  CAIO Quintana  Minutes: 19

## 2024-08-22 NOTE — PROGRESS NOTES
attempted a visit with the patient in 473 during rounding; however, upon attempting to begin conversation, the patient stated that he preferred to sleep.  Patient was informed to notify nurse of a better time he is willing to be visited by a  and that we are available as needed.    Chaplain Nancie Colon M.Div.

## 2024-08-22 NOTE — PLAN OF CARE
Problem: Discharge Planning  Goal: Discharge to home or other facility with appropriate resources  Outcome: Progressing     Problem: Pain  Goal: Verbalizes/displays adequate comfort level or baseline comfort level  Outcome: Progressing     Problem: Skin/Tissue Integrity  Goal: Absence of new skin breakdown  Description: 1.  Monitor for areas of redness and/or skin breakdown  2.  Assess vascular access sites hourly  3.  Every 4-6 hours minimum:  Change oxygen saturation probe site  4.  Every 4-6 hours:  If on nasal continuous positive airway pressure, respiratory therapy assess nares and determine need for appliance change or resting period.  Outcome: Progressing     Problem: ABCDS Injury Assessment  Goal: Absence of physical injury  Outcome: Progressing     Problem: Safety - Adult  Goal: Free from fall injury  Outcome: Progressing     Problem: Respiratory - Adult  Goal: Achieves optimal ventilation and oxygenation  Outcome: Progressing     Problem: Skin/Tissue Integrity - Adult  Goal: Skin integrity remains intact  Outcome: Progressing  Flowsheets (Taken 8/21/2024 4989 by Moriah Meraz RN)  Skin Integrity Remains Intact: Monitor for areas of redness and/or skin breakdown     Problem: Genitourinary - Adult  Goal: Urinary catheter remains patent  Outcome: Progressing     Problem: Metabolic/Fluid and Electrolytes - Adult  Goal: Electrolytes maintained within normal limits  Outcome: Progressing  Goal: Hemodynamic stability and optimal renal function maintained  Outcome: Progressing  Goal: Glucose maintained within prescribed range  Outcome: Progressing     Problem: Neurosensory - Adult  Goal: Achieves stable or improved neurological status  Outcome: Progressing  Goal: Absence of seizures  Outcome: Progressing  Goal: Remains free of injury related to seizures activity  Outcome: Progressing  Goal: Achieves maximal functionality and self care  Outcome: Progressing     Problem: Cardiovascular - Adult  Goal:  Maintains optimal cardiac output and hemodynamic stability  Outcome: Progressing  Goal: Absence of cardiac dysrhythmias or at baseline  Outcome: Progressing     Problem: Musculoskeletal - Adult  Goal: Return mobility to safest level of function  Outcome: Progressing  Goal: Maintain proper alignment of affected body part  Outcome: Progressing  Goal: Return ADL status to a safe level of function  Outcome: Progressing     Problem: Gastrointestinal - Adult  Goal: Maintains or returns to baseline bowel function  Outcome: Progressing     Problem: Hematologic - Adult  Goal: Maintains hematologic stability  Outcome: Progressing

## 2024-08-22 NOTE — PROGRESS NOTES
CARDIOLOGY PROGRESS NOTE      Patient Name: Abel Cruz  Age: 83 y.o.  Gender:male  :1940  MRN: [id number]    Patient seen and examined. This is a patient with a history of hypertension, hyperlipidemia, tremors, CKD s/p left nephrectomy, prostate cancer, chronic anemia  who presented with pleuritic pain, cough now being followed for SVT. Resting in bed. Denies chest pain overnight. Breathing stable on 1 L oxygen via nasal cannula.      Telemetry reviewed.    Pertinent review of systems items noted above, all other systems are negative. Current medications reviewed.    Physical Examination    Allergies   Allergen Reactions    Naproxen Other (See Comments)     Gastric Ulcer With Hemorrhage.    Lisinopril Nausea And Vomiting    Morphine Rash     Vitals:    24 1237   BP:    Pulse:    Resp:    Temp: 98.6 °F (37 °C)   SpO2:      Vital signs are stable  No apparent distress, frail, chronically ill appearing  Heart has a mildly tachycardic rate and regular rhythm.  no murmur  Lungs are diminished  Abdomen is soft, nontender, normal bowel sounds.  Extremities have no edema  Skin is dry and warm.  Normal affect    Labs reviewed:  Recent Results (from the past 12 hour(s))   CBC with Auto Differential    Collection Time: 24  3:56 AM   Result Value Ref Range    WBC 6.2 4.1 - 11.1 K/uL    RBC 2.95 (L) 4.10 - 5.70 M/uL    Hemoglobin 8.8 (L) 12.1 - 17.0 g/dL    Hematocrit 27.2 (L) 36.6 - 50.3 %    MCV 92.2 80.0 - 99.0 FL    MCH 29.8 26.0 - 34.0 PG    MCHC 32.4 30.0 - 36.5 g/dL    RDW 20.1 (H) 11.5 - 14.5 %    Platelets 184 150 - 400 K/uL    MPV 9.5 8.9 - 12.9 FL    Nucleated RBCs 0.0 0.0  WBC    nRBC 0.00 0.00 - 0.01 K/uL    Neutrophils % 79 (H) 32 - 75 %    Lymphocytes % 6 (L) 12 - 49 %    Monocytes % 14 (H) 5 - 13 %    Eosinophils % 1 0 - 7 %    Basophils % 0 0 - 1 %    Immature Granulocytes % 0 %    Neutrophils Absolute 4.8 1.8 - 8.0 K/UL    Lymphocytes Absolute 0.4 (L) 0.8 - 3.5 K/UL    Monocytes  hydralazine and amlodipine to allow for GDMT   Continue to monitor.   Hyperlipidemia: Continue statin therapy  Pulmonary nodules w/ rt sided pleural effusion: s/p thoracentesis, cyto negative for cancer. pulmonology following.   Rt hydronephrosis, NETO: s/p left nephrectomy in the 1980s per pt. Creat elevated, improving. Nephrology and urology on board. Cystoscopy cancelled, pt will fu outpt.  Essential tremors: on propranolol at home (now on hold), primidone  Anemia: pending transfusion today, Per primary     Please do not hesitate to call if additional questions arise.    ABNER TAYLOR, APRN - NP  8/22/2024

## 2024-08-22 NOTE — PROGRESS NOTES
Hospitalist Progress Note               Daily Progress Note: 8/22/2024      Hospital Day: 12     Chief complaint:   Chief Complaint   Patient presents with    transfer of care        Subjective:   Hospital course to date:Mr. Cruz is a pleasant gentleman with history of hypertension, hyperlipidemia, CKD s/p nephrectomy, prostate cancer, chronic anemia who presented to the ER with pleuritic chest pain and subsequent. CT showed diffuse pulmonary nodules and right pleural effusion. He is s/p right thoracentesis with final cultures pending      --------  Patient is seen today for follow-up.  Offers no complaints.  No reported fevers or chills.  [facility]        Medications reviewed  Current Facility-Administered Medications   Medication Dose Route Frequency    sodium bicarbonate tablet 650 mg  650 mg Oral TID    magnesium sulfate 2000 mg in 50 mL IVPB premix  2,000 mg IntraVENous Once    empagliflozin (JARDIANCE) tablet 10 mg  10 mg Oral Daily    0.9 % sodium chloride infusion   IntraVENous PRN    baclofen (LIORESAL) tablet 5 mg  5 mg Oral TID PRN    albuterol (PROVENTIL) nebulizer solution 2.5 mg  2.5 mg Nebulization Q4H PRN    cefTRIAXone (ROCEPHIN) 2,000 mg in sterile water 20 mL IV syringe  2,000 mg IntraVENous Q24H    doxycycline hyclate (VIBRAMYCIN) capsule 100 mg  100 mg Oral BID    metoprolol (LOPRESSOR) injection 2.5 mg  2.5 mg IntraVENous Q1H PRN    carvedilol (COREG) tablet 25 mg  25 mg Oral BID WC    LORazepam (ATIVAN) tablet 1 mg  1 mg Oral Q6H PRN    heparin (porcine) injection 5,000 Units  5,000 Units SubCUTAneous 3 times per day    metoclopramide (REGLAN) tablet 10 mg  10 mg Oral TID AC    melatonin tablet 5 mg  5 mg Oral Nightly PRN    epoetin paul-epbx (RETACRIT) injection 10,000 Units  10,000 Units SubCUTAneous Weekly    folic acid (FOLVITE) tablet 1 mg  1 mg Oral Daily    sodium chloride flush 0.9 % injection 5-40 mL  5-40

## 2024-08-22 NOTE — PLAN OF CARE
PHYSICAL THERAPY TREATMENT     Patient: Abel Cruz (83 y.o. male)  Date: 8/22/2024  Diagnosis: Hyperkalemia [E87.5]  Pleural effusion [J90]  NETO (acute kidney injury) (HCC) [N17.9] Pleural effusion  Procedure(s) (LRB):  CYSTOSCOPY, RETROGRADE PYELOGRAM, URETERAL STENT INSERTION (Right) 8 Days Post-Op  Precautions: Fall Risk                      Recommendations for nursing mobility: Out of bed to chair for meals, Use of BSC for toileting , AD and gt belt for bed to chair , and Assist x1    In place during session: Kim Catheter  Chart, physical therapy assessment, plan of care and goals were reviewed.  ASSESSMENT  Patient continues with skilled PT services and is progressing towards goals. Pt supine in bed upon PT arrival, agreeable to session. (See below for objective details and assist levels).     Overall pt tolerated session well today. Pt completed transfers with sba and additional time. Pt transferred from bed>chair>bedside commode>chair with no hands on assistance. Pt educated to perform therex given to him on handout, pt verbalized understanding. Pt left sitting up in chair with all needs met. Will continue to benefit from skilled PT services, and will continue to progress as tolerated. Potential barriers for safe discharge:. Current PT DC recommendation Inpatient Rehabilitation Facility  once medically appropriate.    GOALS:  Problem: Physical Therapy - Adult  Goal: By Discharge: Performs mobility at highest level of function for planned discharge setting.  See evaluation for individualized goals.  Description: FUNCTIONAL STATUS PRIOR TO ADMISSION: Patient was independent and active without use of DME.    HOME SUPPORT PRIOR TO ADMISSION: The patient lived alone with friends and neighbors to provide assistance with some IADL tasks.    Physical Therapy Goals  Initiated 8/18/2024  Pt stated goal: Get better  Pt will be I with [facility] in 7 days.  Pt will perform bed mobility with [place] in 7

## 2024-08-23 ENCOUNTER — APPOINTMENT (OUTPATIENT)
Facility: HOSPITAL | Age: 84
DRG: 204 | End: 2024-08-23
Payer: OTHER GOVERNMENT

## 2024-08-23 ENCOUNTER — APPOINTMENT (OUTPATIENT)
Facility: HOSPITAL | Age: 84
DRG: 204 | End: 2024-08-23
Attending: INTERNAL MEDICINE
Payer: OTHER GOVERNMENT

## 2024-08-23 LAB
ALBUMIN SERPL-MCNC: 1.9 G/DL (ref 3.5–5)
ANION GAP SERPL CALC-SCNC: 9 MMOL/L (ref 5–15)
BASOPHILS # BLD: 0 K/UL (ref 0–0.1)
BASOPHILS NFR BLD: 1 % (ref 0–1)
BUN SERPL-MCNC: 68 MG/DL (ref 6–20)
BUN/CREAT SERPL: 23 (ref 12–20)
CA-I BLD-MCNC: 9.2 MG/DL (ref 8.5–10.1)
CHLORIDE SERPL-SCNC: 107 MMOL/L (ref 97–108)
CO2 SERPL-SCNC: 23 MMOL/L (ref 21–32)
CREAT SERPL-MCNC: 3.01 MG/DL (ref 0.7–1.3)
DIFFERENTIAL METHOD BLD: ABNORMAL
ECHO BSA: 1.62 M2
EOSINOPHIL # BLD: 0.1 K/UL (ref 0–0.4)
EOSINOPHIL NFR BLD: 2 % (ref 0–7)
ERYTHROCYTE [DISTWIDTH] IN BLOOD BY AUTOMATED COUNT: 19.7 % (ref 11.5–14.5)
GLUCOSE SERPL-MCNC: 152 MG/DL (ref 65–100)
HCT VFR BLD AUTO: 25.1 % (ref 36.6–50.3)
HCT VFR BLD AUTO: 25.6 % (ref 36.6–50.3)
HGB BLD-MCNC: 8.1 G/DL (ref 12.1–17)
HGB BLD-MCNC: 8.2 G/DL (ref 12.1–17)
IMM GRANULOCYTES # BLD AUTO: 0.1 K/UL (ref 0–0.04)
IMM GRANULOCYTES NFR BLD AUTO: 1 % (ref 0–0.5)
LYMPHOCYTES # BLD: 0.4 K/UL (ref 0.8–3.5)
LYMPHOCYTES NFR BLD: 5 % (ref 12–49)
MAGNESIUM SERPL-MCNC: 2 MG/DL (ref 1.6–2.4)
MCH RBC QN AUTO: 29.6 PG (ref 26–34)
MCHC RBC AUTO-ENTMCNC: 32 G/DL (ref 30–36.5)
MCV RBC AUTO: 92.4 FL (ref 80–99)
MONOCYTES # BLD: 1.4 K/UL (ref 0–1)
MONOCYTES NFR BLD: 18 % (ref 5–13)
NEUTS SEG # BLD: 5.5 K/UL (ref 1.8–8)
NEUTS SEG NFR BLD: 73 % (ref 32–75)
NRBC # BLD: 0 K/UL (ref 0–0.01)
NRBC BLD-RTO: 0 PER 100 WBC
NUC STRESS EJECTION FRACTION: 45 %
PHOSPHATE SERPL-MCNC: 4 MG/DL (ref 2.6–4.7)
PLATELET # BLD AUTO: 203 K/UL (ref 150–400)
PMV BLD AUTO: 10.3 FL (ref 8.9–12.9)
POTASSIUM SERPL-SCNC: 4.8 MMOL/L (ref 3.5–5.1)
RBC # BLD AUTO: 2.77 M/UL (ref 4.1–5.7)
SODIUM SERPL-SCNC: 139 MMOL/L (ref 136–145)
STRESS BASELINE DIAS BP: 84 MMHG
STRESS BASELINE HR: 103 BPM
STRESS BASELINE SYS BP: 125 MMHG
STRESS STAGE 1 DURATION: 1 MIN:SEC
STRESS STAGE 1 HR: 114 BPM
STRESS STAGE 2 BP: NORMAL MMHG
STRESS STAGE 2 DURATION: 2 MIN:SEC
STRESS STAGE 2 HR: 113 BPM
STRESS STAGE 3 BP: NORMAL MMHG
STRESS STAGE 3 DURATION: 3 MIN:SEC
STRESS STAGE 3 HR: 115 BPM
STRESS STAGE 4 COMMENTS: NORMAL
STRESS STAGE 4 DURATION: 4 MIN:SEC
STRESS STAGE 4 HR: 113 BPM
STRESS STAGE 5 BP: NORMAL MMHG
STRESS STAGE 5 DURATION: 5 MIN:SEC
STRESS STAGE 5 HR: 112 BPM
STRESS STAGE 6 DURATION: 6 MIN:SEC
STRESS STAGE 6 HR: 110 BPM
STRESS STAGE 7 BP: NORMAL MMHG
STRESS STAGE 7 DURATION: 7 MIN:SEC
STRESS STAGE 7 HR: 110 BPM
STRESS TARGET HR: 139 BPM
WBC # BLD AUTO: 7.4 K/UL (ref 4.1–11.1)

## 2024-08-23 PROCEDURE — 6370000000 HC RX 637 (ALT 250 FOR IP)

## 2024-08-23 PROCEDURE — 51702 INSERT TEMP BLADDER CATH: CPT

## 2024-08-23 PROCEDURE — 97535 SELF CARE MNGMENT TRAINING: CPT

## 2024-08-23 PROCEDURE — 97530 THERAPEUTIC ACTIVITIES: CPT

## 2024-08-23 PROCEDURE — 85014 HEMATOCRIT: CPT

## 2024-08-23 PROCEDURE — 6360000002 HC RX W HCPCS

## 2024-08-23 PROCEDURE — 71045 X-RAY EXAM CHEST 1 VIEW: CPT

## 2024-08-23 PROCEDURE — C1729 CATH, DRAINAGE: HCPCS

## 2024-08-23 PROCEDURE — 3430000000 HC RX DIAGNOSTIC RADIOPHARMACEUTICAL

## 2024-08-23 PROCEDURE — 80069 RENAL FUNCTION PANEL: CPT

## 2024-08-23 PROCEDURE — 32551 INSERTION OF CHEST TUBE: CPT

## 2024-08-23 PROCEDURE — 6360000002 HC RX W HCPCS: Performed by: INTERNAL MEDICINE

## 2024-08-23 PROCEDURE — 83735 ASSAY OF MAGNESIUM: CPT

## 2024-08-23 PROCEDURE — 85018 HEMOGLOBIN: CPT

## 2024-08-23 PROCEDURE — 6370000000 HC RX 637 (ALT 250 FOR IP): Performed by: INTERNAL MEDICINE

## 2024-08-23 PROCEDURE — A9500 TC99M SESTAMIBI: HCPCS

## 2024-08-23 PROCEDURE — 2580000003 HC RX 258: Performed by: INTERNAL MEDICINE

## 2024-08-23 PROCEDURE — 85025 COMPLETE CBC W/AUTO DIFF WBC: CPT

## 2024-08-23 PROCEDURE — 2060000000 HC ICU INTERMEDIATE R&B

## 2024-08-23 PROCEDURE — 6370000000 HC RX 637 (ALT 250 FOR IP): Performed by: STUDENT IN AN ORGANIZED HEALTH CARE EDUCATION/TRAINING PROGRAM

## 2024-08-23 PROCEDURE — 78452 HT MUSCLE IMAGE SPECT MULT: CPT

## 2024-08-23 PROCEDURE — 0W9930Z DRAINAGE OF RIGHT PLEURAL CAVITY WITH DRAINAGE DEVICE, PERCUTANEOUS APPROACH: ICD-10-PCS | Performed by: STUDENT IN AN ORGANIZED HEALTH CARE EDUCATION/TRAINING PROGRAM

## 2024-08-23 PROCEDURE — 36415 COLL VENOUS BLD VENIPUNCTURE: CPT

## 2024-08-23 RX ORDER — TETRAKIS(2-METHOXYISOBUTYLISOCYANIDE)COPPER(I) TETRAFLUOROBORATE 1 MG/ML
9.32 INJECTION, POWDER, LYOPHILIZED, FOR SOLUTION INTRAVENOUS
Status: COMPLETED | OUTPATIENT
Start: 2024-08-23 | End: 2024-08-23

## 2024-08-23 RX ORDER — TETRAKIS(2-METHOXYISOBUTYLISOCYANIDE)COPPER(I) TETRAFLUOROBORATE 1 MG/ML
29.4 INJECTION, POWDER, LYOPHILIZED, FOR SOLUTION INTRAVENOUS
Status: COMPLETED | OUTPATIENT
Start: 2024-08-23 | End: 2024-08-23

## 2024-08-23 RX ORDER — REGADENOSON 0.08 MG/ML
INJECTION, SOLUTION INTRAVENOUS
Status: COMPLETED
Start: 2024-08-23 | End: 2024-08-23

## 2024-08-23 RX ORDER — AMINOPHYLLINE 25 MG/ML
125 INJECTION, SOLUTION INTRAVENOUS ONCE
Status: COMPLETED | OUTPATIENT
Start: 2024-08-23 | End: 2024-08-23

## 2024-08-23 RX ADMIN — HEPARIN SODIUM 5000 UNITS: 5000 INJECTION INTRAVENOUS; SUBCUTANEOUS at 13:31

## 2024-08-23 RX ADMIN — DOXYCYCLINE HYCLATE 100 MG: 100 CAPSULE ORAL at 08:49

## 2024-08-23 RX ADMIN — SODIUM CHLORIDE, PRESERVATIVE FREE 10 ML: 5 INJECTION INTRAVENOUS at 08:14

## 2024-08-23 RX ADMIN — ACETAMINOPHEN 650 MG: 325 TABLET ORAL at 21:26

## 2024-08-23 RX ADMIN — CARVEDILOL 25 MG: 12.5 TABLET, FILM COATED ORAL at 18:28

## 2024-08-23 RX ADMIN — DOXYCYCLINE HYCLATE 100 MG: 100 CAPSULE ORAL at 21:25

## 2024-08-23 RX ADMIN — SODIUM BICARBONATE 650 MG: 650 TABLET ORAL at 21:25

## 2024-08-23 RX ADMIN — CARVEDILOL 25 MG: 12.5 TABLET, FILM COATED ORAL at 08:10

## 2024-08-23 RX ADMIN — METOCLOPRAMIDE 10 MG: 10 TABLET ORAL at 13:36

## 2024-08-23 RX ADMIN — TETRAKIS(2-METHOXYISOBUTYLISOCYANIDE)COPPER(I) TETRAFLUOROBORATE 9.32 MILLICURIE: 1 INJECTION, POWDER, LYOPHILIZED, FOR SOLUTION INTRAVENOUS at 09:40

## 2024-08-23 RX ADMIN — EMPAGLIFLOZIN 10 MG: 10 TABLET, FILM COATED ORAL at 08:09

## 2024-08-23 RX ADMIN — PRIMIDONE 100 MG: 50 TABLET ORAL at 21:25

## 2024-08-23 RX ADMIN — PRIMIDONE 100 MG: 50 TABLET ORAL at 08:48

## 2024-08-23 RX ADMIN — REGADENOSON 0.4 MG: 0.08 INJECTION, SOLUTION INTRAVENOUS at 11:19

## 2024-08-23 RX ADMIN — AMINOPHYLLINE 125 MG: 25 INJECTION, SOLUTION INTRAVENOUS at 11:24

## 2024-08-23 RX ADMIN — BACLOFEN 5 MG: 10 TABLET ORAL at 18:31

## 2024-08-23 RX ADMIN — SODIUM CHLORIDE, PRESERVATIVE FREE 10 ML: 5 INJECTION INTRAVENOUS at 21:31

## 2024-08-23 RX ADMIN — SODIUM BICARBONATE 650 MG: 650 TABLET ORAL at 13:30

## 2024-08-23 RX ADMIN — TETRAKIS(2-METHOXYISOBUTYLISOCYANIDE)COPPER(I) TETRAFLUOROBORATE 29.4 MILLICURIE: 1 INJECTION, POWDER, LYOPHILIZED, FOR SOLUTION INTRAVENOUS at 11:20

## 2024-08-23 RX ADMIN — ATORVASTATIN CALCIUM 20 MG: 20 TABLET, FILM COATED ORAL at 08:09

## 2024-08-23 RX ADMIN — METOCLOPRAMIDE 10 MG: 10 TABLET ORAL at 18:28

## 2024-08-23 RX ADMIN — FOLIC ACID 1 MG: 1 TABLET ORAL at 08:09

## 2024-08-23 RX ADMIN — SODIUM BICARBONATE 650 MG: 650 TABLET ORAL at 08:09

## 2024-08-23 RX ADMIN — METOCLOPRAMIDE 10 MG: 10 TABLET ORAL at 06:21

## 2024-08-23 RX ADMIN — MEGESTROL ACETATE 400 MG: 40 SUSPENSION ORAL at 08:09

## 2024-08-23 RX ADMIN — CEFTRIAXONE SODIUM 2000 MG: 2 INJECTION, POWDER, FOR SOLUTION INTRAMUSCULAR; INTRAVENOUS at 13:31

## 2024-08-23 RX ADMIN — HEPARIN SODIUM 5000 UNITS: 5000 INJECTION INTRAVENOUS; SUBCUTANEOUS at 21:31

## 2024-08-23 ASSESSMENT — PAIN DESCRIPTION - LOCATION: LOCATION: ANKLE

## 2024-08-23 ASSESSMENT — PAIN DESCRIPTION - ORIENTATION: ORIENTATION: LEFT

## 2024-08-23 ASSESSMENT — PAIN SCALES - GENERAL: PAINLEVEL_OUTOF10: 4

## 2024-08-23 ASSESSMENT — PAIN DESCRIPTION - DESCRIPTORS: DESCRIPTORS: ACHING;DISCOMFORT

## 2024-08-23 NOTE — PROGRESS NOTES
Pulmonary Progress Note    Subjective:     Patient seen and examined in his room on the floor this afternoon, no acute events overnight.  Saturating well on room air, hemoglobin stable, creatinine down slightly to 3.01 from 3.15, magnesium level now normal.  Net -1.15 L yesterday.  Went for stress testing today, results pending.  Pleural fluid cytology negative, no growth to date on pleural fluid culture.  Repeat chest x-ray today shows worsening right-sided pleural effusion.    Consult IR for right sided chest tube placement, keep on -20 cm H2O suction over the weekend and repeat a CT scan of the chest either  or Monday.  May require intrapleural lytics.        Patient Active Problem List   Diagnosis    Pleural effusion    Hydronephrosis        Allergies   Allergen Reactions    Naproxen Other (See Comments)     Gastric Ulcer With Hemorrhage.    Lisinopril Nausea And Vomiting    Morphine Rash        Review of Systems:  A comprehensive review of systems was negative except for that written in the History of Present Illness.    Labs:    Recent Labs     24  0450 24  1902 24  0356 24  0237   WBC 6.0  --  6.2 7.4   HGB 6.5* 8.6* 8.8* 8.2*   HCT 20.6* 26.6* 27.2* 25.6*     --  184 203     Recent Labs     24  0450 24  0356 24  0237    137 139   K 5.0 4.9 4.8    107 107   CO2 22 24 23   GLUCOSE 121* 115* 152*   BUN 68* 70* 68*   CREATININE 3.21* 3.15* 3.01*   CALCIUM 8.7 9.2 9.2   MG 1.7 1.6 2.0   PHOS 4.6 4.1 4.0           Objective:   Blood pressure 122/83, pulse (!) 102, temperature 98.2 °F (36.8 °C), temperature source Oral, resp. rate 18, height 1.676 m (5' 6\"), weight 56.2 kg (124 lb), SpO2 94%.  Temp (24hrs), Av.1 °F (36.7 °C), Min:97.7 °F (36.5 °C), Max:98.7 °F (37.1 °C)    IR GUIDED THORACENTESIS PLEURAL   Final Result   Technically successful ultrasound guided right thoracentesis yielding 1650 ml of   fluid.  A post procedure chest x-ray is  answered at bedside in detail  Case discussed in detail with RN, RT, and care team  Thank you for involving me in the care of this patient  I will follow with you closely during hospitalization    Time spent more than 30 minutes under patient care with no overlap reviewing results and records, decision making, and answering questions.    Kelechi Armenta DO  [facility] ([facility])     This dictation was done by dragon, computer voice recognition software.  Often unanticipated grammatical, syntax, [place] phones and other interpretive errors are inadvertently transcribed.  Please excuse errors that have escaped final proofreading.

## 2024-08-23 NOTE — OR NURSING
10f chest drain placed to right posterior chest and to atrium -20 water seal and draining thin red fluid. Post cxr obtained.

## 2024-08-23 NOTE — PROGRESS NOTES
NAME:  Abel Cruz   :   1940   MRN:   [id number]     ATTENDING: Nils Malik Cha, MD  PCP:  Jeremy Grimes MD    Date/Time:  2024       Subjective:     Patient seen at bedside today, comfortably resting.  Not in any acute distress.    Past Medical History:   Diagnosis Date    Hypertension       Past Surgical History:   Procedure Laterality Date    KIDNEY REMOVAL      over 20 years ago     Social History     Tobacco Use    Smoking status: Former     Types: Cigarettes    Smokeless tobacco: Not on file   Substance Use Topics    Alcohol use: Not on file      History reviewed. No pertinent family history.    Allergies   Allergen Reactions    Naproxen Other (See Comments)     Gastric Ulcer With Hemorrhage.    Lisinopril Nausea And Vomiting    Morphine Rash      Prior to Admission medications    Medication Sig Start Date End Date Taking? Authorizing Provider   latanoprost (XALATAN) 0.005 % ophthalmic solution Place 1 drop into both eyes nightly   Yes Mitesh Daily MD   Calcium Citrate-Vitamin D (SM CALCIUM CITRATE W/VIT D3 PO) Take 1,000 mg by mouth Daily   Yes Mitesh Daily MD   lidocaine (BLUE-EMU PAIN RELIEF DRY) 4 % external patch Place 1 patch onto the skin daily   Yes Mitesh Daily MD   ferrous sulfate (IRON 325) 325 (65 Fe) MG tablet Take 1 tablet by mouth daily (with breakfast)   Yes Mitesh Daily MD   atorvastatin (LIPITOR) 40 MG tablet Take 0.5 tablets by mouth daily 24  Yes Mitesh Daily MD   cyanocobalamin 500 MCG tablet Take 2 tablets by mouth daily   Yes Mitesh Daily MD   hydrALAZINE (APRESOLINE) 25 MG tablet Take 1 tablet by mouth 3 times daily   Yes Mitesh Daily MD   megestrol (MEGACE) 40 MG/ML suspension Take 10 mLs by mouth daily 24  Yes Mitesh Daily MD   primidone (MYSOLINE) 50 MG tablet Take 150 tablets by mouth 3 times daily 22  Yes Mitesh Daily MD   propranolol (INNOPRAN XL) 80 MG extended release

## 2024-08-23 NOTE — PROGRESS NOTES
0924hrs-pt enroute to [facility] with transport.      1501hrs-pt has returned from interventional radiology with 10f chest drain placed to right posterior chest and to atrium -20 water seal and draining thin red fluid. Pt denies pain.   no

## 2024-08-23 NOTE — PROGRESS NOTES
CARDIOLOGY PROGRESS NOTE      Patient Name: Abel Cruz  Age: 83 y.o.  Gender:male  :1940  MRN: [id number]    Patient seen and examined. This is a patient with a history of hypertension, hyperlipidemia, tremors, CKD s/p left nephrectomy, prostate cancer, chronic anemia  who presented with pleuritic pain, cough now being followed for SVT. Resting in bed, seen in CVL during stress testing. Denies chest pain overnight. Breathing stable on room air.     Telemetry reviewed.    Pertinent review of systems items noted above, all other systems are negative. Current medications reviewed.    Physical Examination    Allergies   Allergen Reactions    Naproxen Other (See Comments)     Gastric Ulcer With Hemorrhage.    Lisinopril Nausea And Vomiting    Morphine Rash     Vitals:    24 1111   BP: 125/84   Pulse: (!) 101   Resp:    Temp:    SpO2:      Vital signs are stable  No apparent distress, frail, chronically ill appearing  Heart has a mildly tachycardic rate and regular rhythm.  no murmur  Lungs are diminished  Abdomen is soft, nontender, normal bowel sounds.  Extremities have no edema  Skin is dry and warm.  Normal affect    Labs reviewed:  Recent Results (from the past 12 hour(s))   Magnesium    Collection Time: 24  2:37 AM   Result Value Ref Range    Magnesium 2.0 1.6 - 2.4 mg/dL   CBC with Auto Differential    Collection Time: 24  2:37 AM   Result Value Ref Range    WBC 7.4 4.1 - 11.1 K/uL    RBC 2.77 (L) 4.10 - 5.70 M/uL    Hemoglobin 8.2 (L) 12.1 - 17.0 g/dL    Hematocrit 25.6 (L) 36.6 - 50.3 %    MCV 92.4 80.0 - 99.0 FL    MCH 29.6 26.0 - 34.0 PG    MCHC 32.0 30.0 - 36.5 g/dL    RDW 19.7 (H) 11.5 - 14.5 %    Platelets 203 150 - 400 K/uL    MPV 10.3 8.9 - 12.9 FL    Nucleated RBCs 0.0 0.0  WBC    nRBC 0.00 0.00 - 0.01 K/uL    Neutrophils % 73 32 - 75 %    Lymphocytes % 5 (L) 12 - 49 %    Monocytes % 18 (H) 5 - 13 %    Eosinophils % 2 0 - 7 %    Basophils % 1 0 - 1 %    Immature

## 2024-08-23 NOTE — PROGRESS NOTES
PT attempt this morning and patient off the floor at nuc med, attempted again this afternoon and pt off the floor at IR at 1425. Will follow up another time.

## 2024-08-23 NOTE — PROGRESS NOTES
GIOVANNA faxed all updated clinicals to the [facility] command center this am at [phone].     GIOVANNA called and confirmed with Fredo all clinicals were received and currently remains under review. Fredo informed GIOVANNA Bhagat would be calling later today with an update. Her ext is [phone].     Patient current DCP is [facility]. [facility] could go [facility] or another facility if no beds available at the time of discharge.

## 2024-08-24 ENCOUNTER — APPOINTMENT (OUTPATIENT)
Facility: HOSPITAL | Age: 84
DRG: 204 | End: 2024-08-24
Payer: OTHER GOVERNMENT

## 2024-08-24 LAB
ALBUMIN SERPL-MCNC: 1.9 G/DL (ref 3.5–5)
ALBUMIN SERPL-MCNC: 1.9 G/DL (ref 3.5–5)
ALBUMIN/GLOB SERPL: 0.5 (ref 1.1–2.2)
ALP SERPL-CCNC: 94 U/L (ref 45–117)
ALT SERPL-CCNC: 37 U/L (ref 12–78)
ANION GAP SERPL CALC-SCNC: 9 MMOL/L (ref 5–15)
AST SERPL W P-5'-P-CCNC: 42 U/L (ref 15–37)
BACTERIA SPEC CULT: NORMAL
BASOPHILS # BLD: 0.1 K/UL (ref 0–0.1)
BASOPHILS NFR BLD: 1 % (ref 0–1)
BILIRUB DIRECT SERPL-MCNC: 0.1 MG/DL (ref 0–0.2)
BILIRUB SERPL-MCNC: 0.2 MG/DL (ref 0.2–1)
BNP SERPL-MCNC: 4846 PG/ML
BUN SERPL-MCNC: 74 MG/DL (ref 6–20)
BUN/CREAT SERPL: 25 (ref 12–20)
CA-I BLD-MCNC: 9.1 MG/DL (ref 8.5–10.1)
CHLORIDE SERPL-SCNC: 109 MMOL/L (ref 97–108)
CO2 SERPL-SCNC: 21 MMOL/L (ref 21–32)
CREAT SERPL-MCNC: 3.01 MG/DL (ref 0.7–1.3)
CRP SERPL-MCNC: 20.6 MG/DL (ref 0–0.3)
DIFFERENTIAL METHOD BLD: ABNORMAL
EOSINOPHIL # BLD: 0.1 K/UL (ref 0–0.4)
EOSINOPHIL NFR BLD: 2 % (ref 0–7)
ERYTHROCYTE [DISTWIDTH] IN BLOOD BY AUTOMATED COUNT: 19.5 % (ref 11.5–14.5)
GLOBULIN SER CALC-MCNC: 4 G/DL (ref 2–4)
GLUCOSE SERPL-MCNC: 113 MG/DL (ref 65–100)
GRAM STN SPEC: NORMAL
HCT VFR BLD AUTO: 23.6 % (ref 36.6–50.3)
HGB BLD-MCNC: 7.7 G/DL (ref 12.1–17)
IMM GRANULOCYTES # BLD AUTO: 0.1 K/UL (ref 0–0.04)
IMM GRANULOCYTES NFR BLD AUTO: 1 % (ref 0–0.5)
LYMPHOCYTES # BLD: 0.4 K/UL (ref 0.8–3.5)
LYMPHOCYTES NFR BLD: 5 % (ref 12–49)
Lab: NORMAL
MAGNESIUM SERPL-MCNC: 1.8 MG/DL (ref 1.6–2.4)
MCH RBC QN AUTO: 30.8 PG (ref 26–34)
MCHC RBC AUTO-ENTMCNC: 32.6 G/DL (ref 30–36.5)
MCV RBC AUTO: 94.4 FL (ref 80–99)
MONOCYTES # BLD: 1.5 K/UL (ref 0–1)
MONOCYTES NFR BLD: 20 % (ref 5–13)
NEUTS SEG # BLD: 5.5 K/UL (ref 1.8–8)
NEUTS SEG NFR BLD: 71 % (ref 32–75)
NRBC # BLD: 0 K/UL (ref 0–0.01)
NRBC BLD-RTO: 0 PER 100 WBC
PHOSPHATE SERPL-MCNC: 4.4 MG/DL (ref 2.6–4.7)
PLATELET # BLD AUTO: 162 K/UL (ref 150–400)
PMV BLD AUTO: 10 FL (ref 8.9–12.9)
POTASSIUM SERPL-SCNC: 4.9 MMOL/L (ref 3.5–5.1)
PROCALCITONIN SERPL-MCNC: 1.47 NG/ML
PROT SERPL-MCNC: 5.9 G/DL (ref 6.4–8.2)
RBC # BLD AUTO: 2.5 M/UL (ref 4.1–5.7)
SODIUM SERPL-SCNC: 139 MMOL/L (ref 136–145)
WBC # BLD AUTO: 7.6 K/UL (ref 4.1–11.1)

## 2024-08-24 PROCEDURE — 2580000003 HC RX 258: Performed by: INTERNAL MEDICINE

## 2024-08-24 PROCEDURE — 71045 X-RAY EXAM CHEST 1 VIEW: CPT

## 2024-08-24 PROCEDURE — 84145 PROCALCITONIN (PCT): CPT

## 2024-08-24 PROCEDURE — 2060000000 HC ICU INTERMEDIATE R&B

## 2024-08-24 PROCEDURE — 83880 ASSAY OF NATRIURETIC PEPTIDE: CPT

## 2024-08-24 PROCEDURE — 6370000000 HC RX 637 (ALT 250 FOR IP): Performed by: INTERNAL MEDICINE

## 2024-08-24 PROCEDURE — 83735 ASSAY OF MAGNESIUM: CPT

## 2024-08-24 PROCEDURE — 6370000000 HC RX 637 (ALT 250 FOR IP)

## 2024-08-24 PROCEDURE — 36415 COLL VENOUS BLD VENIPUNCTURE: CPT

## 2024-08-24 PROCEDURE — 6360000002 HC RX W HCPCS: Performed by: INTERNAL MEDICINE

## 2024-08-24 PROCEDURE — 85025 COMPLETE CBC W/AUTO DIFF WBC: CPT

## 2024-08-24 PROCEDURE — 86140 C-REACTIVE PROTEIN: CPT

## 2024-08-24 PROCEDURE — 80076 HEPATIC FUNCTION PANEL: CPT

## 2024-08-24 PROCEDURE — 6370000000 HC RX 637 (ALT 250 FOR IP): Performed by: STUDENT IN AN ORGANIZED HEALTH CARE EDUCATION/TRAINING PROGRAM

## 2024-08-24 PROCEDURE — 80069 RENAL FUNCTION PANEL: CPT

## 2024-08-24 PROCEDURE — 2500000003 HC RX 250 WO HCPCS: Performed by: PHYSICIAN ASSISTANT

## 2024-08-24 RX ORDER — LOPERAMIDE HCL 2 MG
2 CAPSULE ORAL 4 TIMES DAILY PRN
Status: DISCONTINUED | OUTPATIENT
Start: 2024-08-24 | End: 2024-08-24

## 2024-08-24 RX ORDER — TRAMADOL HYDROCHLORIDE 50 MG/1
50 TABLET ORAL EVERY 6 HOURS PRN
Status: DISCONTINUED | OUTPATIENT
Start: 2024-08-24 | End: 2024-08-28 | Stop reason: HOSPADM

## 2024-08-24 RX ORDER — LOPERAMIDE HCL 2 MG
2 CAPSULE ORAL 4 TIMES DAILY PRN
Status: DISCONTINUED | OUTPATIENT
Start: 2024-08-24 | End: 2024-08-28 | Stop reason: HOSPADM

## 2024-08-24 RX ADMIN — MEGESTROL ACETATE 400 MG: 40 SUSPENSION ORAL at 08:31

## 2024-08-24 RX ADMIN — Medication 5 MG: at 00:34

## 2024-08-24 RX ADMIN — METOCLOPRAMIDE 10 MG: 10 TABLET ORAL at 06:45

## 2024-08-24 RX ADMIN — ATORVASTATIN CALCIUM 20 MG: 20 TABLET, FILM COATED ORAL at 08:31

## 2024-08-24 RX ADMIN — SODIUM BICARBONATE 650 MG: 650 TABLET ORAL at 12:30

## 2024-08-24 RX ADMIN — BACLOFEN 5 MG: 10 TABLET ORAL at 12:30

## 2024-08-24 RX ADMIN — LOPERAMIDE HYDROCHLORIDE 2 MG: 2 CAPSULE ORAL at 08:31

## 2024-08-24 RX ADMIN — FOLIC ACID 1 MG: 1 TABLET ORAL at 08:31

## 2024-08-24 RX ADMIN — EMPAGLIFLOZIN 10 MG: 10 TABLET, FILM COATED ORAL at 08:31

## 2024-08-24 RX ADMIN — LOPERAMIDE HYDROCHLORIDE 2 MG: 2 CAPSULE ORAL at 16:12

## 2024-08-24 RX ADMIN — PRIMIDONE 100 MG: 50 TABLET ORAL at 22:03

## 2024-08-24 RX ADMIN — DOXYCYCLINE HYCLATE 100 MG: 100 CAPSULE ORAL at 22:03

## 2024-08-24 RX ADMIN — SODIUM BICARBONATE 650 MG: 650 TABLET ORAL at 22:03

## 2024-08-24 RX ADMIN — HEPARIN SODIUM 5000 UNITS: 5000 INJECTION INTRAVENOUS; SUBCUTANEOUS at 22:04

## 2024-08-24 RX ADMIN — CEFTRIAXONE SODIUM 2000 MG: 2 INJECTION, POWDER, FOR SOLUTION INTRAMUSCULAR; INTRAVENOUS at 13:53

## 2024-08-24 RX ADMIN — Medication 5 MG: at 22:03

## 2024-08-24 RX ADMIN — SODIUM BICARBONATE 650 MG: 650 TABLET ORAL at 08:31

## 2024-08-24 RX ADMIN — LORAZEPAM 1 MG: 1 TABLET ORAL at 22:03

## 2024-08-24 RX ADMIN — SODIUM CHLORIDE, PRESERVATIVE FREE 10 ML: 5 INJECTION INTRAVENOUS at 08:32

## 2024-08-24 RX ADMIN — METOCLOPRAMIDE 10 MG: 10 TABLET ORAL at 12:30

## 2024-08-24 RX ADMIN — DOXYCYCLINE HYCLATE 100 MG: 100 CAPSULE ORAL at 08:31

## 2024-08-24 RX ADMIN — PRIMIDONE 100 MG: 50 TABLET ORAL at 08:31

## 2024-08-24 RX ADMIN — SODIUM CHLORIDE, PRESERVATIVE FREE 10 ML: 5 INJECTION INTRAVENOUS at 22:08

## 2024-08-24 RX ADMIN — CARVEDILOL 25 MG: 12.5 TABLET, FILM COATED ORAL at 08:31

## 2024-08-24 RX ADMIN — METOPROLOL TARTRATE 2.5 MG: 5 INJECTION INTRAVENOUS at 19:27

## 2024-08-24 RX ADMIN — CARVEDILOL 25 MG: 12.5 TABLET, FILM COATED ORAL at 16:13

## 2024-08-24 ASSESSMENT — PAIN SCALES - GENERAL
PAINLEVEL_OUTOF10: 0

## 2024-08-24 NOTE — PROGRESS NOTES
Acute/Chronic Illness/injury posing threat to life or bodily function:    [x] Severe exacerbation of chronic illness:    ---------------------------------------------------------------------  B. Risk of Treatment (any 1)   [x] Drugs/treatments that require intensive monitoring for toxicity include:    [x] IV ABX requiring serial renal monitoring for nephrotoxicity:     [] IV Narcotic analgesia for adverse drug reaction  [x] Aggressive IV diuresis requiring serial monitoring for renal impairment and electrolyte derangements  [] Critical electrolyte abnormalities requiring IV replacement and close serial monitoring  [] SQ Insulin SS- monitoring serial FSBS for Hypoglycemic adverse drug reaction  [] Other -   [] Change in code status:    [] Decision to escalate care:    [x] Major surgery/procedure with associated risk factors:  chest tube  ----------------------------------------------------------------------  C. Data (any 2)  [x] Discussed current management and discharge planning options with Case Management.  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 He presented with symptoms of right lower chest discomfort on taking deep breath or coughing. Has reported weight loss of about 10 to 15 pounds in the last couple of months. Noted to have bilateral tiny pulmonary nodules along with moderate right-sided pleural effusion. He has history of smoking for 25 years before he quit  smoking 20 years ago. Drinks regularly up until last 1 month when he quit drinking. He is noted to have elevated BUN and creatinine along with hemoglobin of 9.6, on admission.     Mr. Cruz is a pleasant gentleman with history of hypertension, hyperlipidemia, CKD s/p nephrectomy, prostate cancer, chronic anemia who presented to the ER with pleuritic chest pain and subsequent. CT showed diffuse pulmonary nodules and right pleural effusion. He is s/p right thoracentesis with final cultures pending    8/14/24 echocardiogram    Left Ventricle: Severely reduced left ventricular systolic function with a visually estimated EF of 30 - 35%. Left ventricle size is normal. Moderately increased wall thickness. Global hypokinesis present. Grade I diastolic dysfunction with normal LAP.    Right Ventricle: Mildly reduced systolic function.    Aorta: Normal sized aortic root. Mildly dilated ascending aorta. Ao ascending diameter is 3.9 cm.    Extracardiac: Right pleural effusion.    Image quality is fair.     8/22/24  Patient is seen today for follow-up.  Offers no complaints.  No reported fevers or chills.  [facility]    8/23/24 nuclear cardiac stress test    Stress Combined Conclusion: The study is positive for myocardial infarction.    Stress Function: Left ventricular function post-stress is abnormal. Post-stress ejection fraction is 45%.    Perfusion Comments: LV perfusion is abnormal.    Perfusion Defect: There is a left ventricular stress perfusion defect present in the inferior segment(s) that is predominantly fixed. Perfusion defect was visually and quantitatively present. This defect was visualized during the stress and rest phases of imaging. The defect appears to be probable infarction.    8/23/24 pulmonary  Consult IR for right sided chest tube placement, keep on -20 cm H2O suction over the weekend and repeat a CT scan of the chest either Sunday

## 2024-08-24 NOTE — PLAN OF CARE
PTA attempted to see patient for skilled therapy session however, patient had chest tube placement yesterday 8/23 and will require a reassessment from a PT prior to continuing with skilled therapy sessions to determine what, if any, change in medical status there might be. Will continue to follow patient.

## 2024-08-24 NOTE — PLAN OF CARE
Kim Carmen RN  Outcome: Progressing  Goal: Oral mucous membranes remain intact  8/24/2024 0746 by Belle Wang RN  Outcome: Progressing  8/24/2024 0036 by Kim Carmen RN  Outcome: Progressing     Problem: Genitourinary - Adult  Goal: Absence of urinary retention  8/24/2024 0746 by Belle Wang RN  Outcome: Progressing  8/24/2024 0036 by Kim Carmen RN  Outcome: Progressing  Goal: Urinary catheter remains patent  8/24/2024 0746 by Belle Wang RN  Outcome: Progressing  8/24/2024 0036 by Kim Carmen RN  Outcome: Progressing     Problem: Metabolic/Fluid and Electrolytes - Adult  Goal: Electrolytes maintained within normal limits  8/24/2024 0746 by Belle Wang RN  Outcome: Progressing  8/24/2024 0036 by Kim Carmen RN  Outcome: Progressing  Goal: Hemodynamic stability and optimal renal function maintained  8/24/2024 0746 by Belle Wang RN  Outcome: Progressing  8/24/2024 0036 by Kim Carmen RN  Outcome: Progressing  Goal: Glucose maintained within prescribed range  8/24/2024 0746 by Belle Wang RN  Outcome: Progressing  8/24/2024 0036 by Kim Carmen RN  Outcome: Progressing     Problem: Neurosensory - Adult  Goal: Achieves stable or improved neurological status  8/24/2024 0746 by Belle Wang RN  Outcome: Progressing  8/24/2024 0036 by Kim Carmen RN  Outcome: Progressing  Goal: Absence of seizures  8/24/2024 0746 by Blele Wang RN  Outcome: Progressing  8/24/2024 0036 by Kim Carmen RN  Outcome: Progressing  Goal: Remains free of injury related to seizures activity  8/24/2024 0746 by Belle Wang RN  Outcome: Progressing  8/24/2024 0036 by Kim Carmen RN  Outcome: Progressing  Goal: Achieves maximal functionality and self care  8/24/2024 0746 by Belle Wang RN  Outcome: Progressing  8/24/2024 0036 by Kim Carmen RN  Outcome: Progressing     Problem: Cardiovascular -  Adult  Goal: Maintains optimal cardiac output and hemodynamic stability  8/24/2024 0746 by Belle Wang RN  Outcome: Progressing  8/24/2024 0036 by Kim Carmen RN  Outcome: Progressing  Goal: Absence of cardiac dysrhythmias or at baseline  8/24/2024 0746 by Belle Wang RN  Outcome: Progressing  8/24/2024 0036 by Kim Carmen RN  Outcome: Progressing     Problem: Musculoskeletal - Adult  Goal: Return mobility to safest level of function  8/24/2024 0746 by Belle Wang RN  Outcome: Progressing  8/24/2024 0036 by Kim Carmen RN  Outcome: Progressing  Goal: Maintain proper alignment of affected body part  8/24/2024 0746 by Belle Wang RN  Outcome: Progressing  8/24/2024 0036 by Kim Carmen RN  Outcome: Progressing  Goal: Return ADL status to a safe level of function  8/24/2024 0746 by Belle Wang RN  Outcome: Progressing  8/24/2024 0036 by Kim Carmen RN  Outcome: Progressing

## 2024-08-24 NOTE — PROGRESS NOTES
thoracentesis removing 1500 cc of thin red fluid from right posterior chest. Pleural fluid collected from a thoracentesis revealed no cells diagnostic for malignancy  -S/P repeat thoracentesis on 8/20 with 1.65 L of neutrophil predominant exudate, pending labs.  -Ciprofloxacin was switched to Rocephin and doxycycline by pulmonary team  - 8/23/24 chest tube placed     Acute A-fib with controlled rate  SVT  Episodes of SVT and increased Coreg to 25 mg twice daily.    -Echo completed revealed severely reduced left ventricular systolic function with a EF of 30 to 35%.  Moderately increased wall thickness.  Global hypokinesis.  Grade 1 diastolic dysfunction with normal LAP.  -Cardiology consulted and following.  Will need ischemic evaluation once prognosis is determined by pulm/oncology.     Right-sided hydronephrosis  S/p left nephrectomy many years ago  History of prostate cancer   Urology evaluated patient and planning for cystoscopy with retrograde pyelogram and possible stent placement of the right ureter and kidney on 8/14/2024.  However, procedure canceled due to patient going into the SVT.  Urology reports patient will need outpatient follow-up with urology to discuss timing of diagnostic workup.  Also recommended PSA level to monitor progression of history of prostate  Per urology, void trial closer to discharge     UTI  Continue Rocephin     NETO with history of CKD?  Metabolic acidosis likely secondary to above resolved  Nephrology reevaluate patient and recommended decreasing sodium bicarb tablets to 650 mg twice daily  Avoid nephrotoxic agents     Anemia of chronic disease  Hemoglobin stable at 8.1  B12 elevated so will discontinue supplementation, iron panel within normal range, folate low to normal so initiated on supplementation  Check FOBT  Continue monitor and transfuse hemoglobin for hemoglobin less than 7     Essential hypertension  Continue Coreg 25 mg twice daily     Hyperlipidemia  Continue

## 2024-08-24 NOTE — PROGRESS NOTES
NAME:  Abel Cruz   :   1940   MRN:   [id number]     ATTENDING: Nils Malik Cha, MD  PCP:  Jeremy Grimes MD    Date/Time:  2024       Subjective:     -Patient seen in his room today.  Was comfortably resting and did not appear to be in any respiratory distress.    -He had a chest tube placement on     Past Medical History:   Diagnosis Date    Hypertension       Past Surgical History:   Procedure Laterality Date    IR CHEST TUBE INSERTION  2024    IR CHEST TUBE INSERTION 2024 Huseyin Hamlin Jr., APRN - NP [facility] ANGIO IR    KIDNEY REMOVAL      over 20 years ago     Social History     Tobacco Use    Smoking status: Former     Types: Cigarettes    Smokeless tobacco: Not on file   Substance Use Topics    Alcohol use: Not on file      History reviewed. No pertinent family history.    Allergies   Allergen Reactions    Naproxen Other (See Comments)     Gastric Ulcer With Hemorrhage.    Lisinopril Nausea And Vomiting    Morphine Rash      Prior to Admission medications    Medication Sig Start Date End Date Taking? Authorizing Provider   latanoprost (XALATAN) 0.005 % ophthalmic solution Place 1 drop into both eyes nightly   Yes Mitesh Daily MD   Calcium Citrate-Vitamin D (SM CALCIUM CITRATE W/VIT D3 PO) Take 1,000 mg by mouth Daily   Yes Mitesh Daily MD   lidocaine (BLUE-EMU PAIN RELIEF DRY) 4 % external patch Place 1 patch onto the skin daily   Yes Mitesh Daily MD   ferrous sulfate (IRON 325) 325 (65 Fe) MG tablet Take 1 tablet by mouth daily (with breakfast)   Yes Mitesh Daily MD   atorvastatin (LIPITOR) 40 MG tablet Take 0.5 tablets by mouth daily 24  Yes Mitesh Daily MD   cyanocobalamin 500 MCG tablet Take 2 tablets by mouth daily   Yes Mitesh Daily MD   hydrALAZINE (APRESOLINE) 25 MG tablet Take 1 tablet by mouth 3 times daily   Yes Mitesh Daily MD   megestrol (MEGACE) 40 MG/ML suspension Take 10 mLs by mouth daily

## 2024-08-24 NOTE — PROGRESS NOTES
Physician Progress Note      PATIENT:               WAYNE GARCIA  CSN #:                  [id number]  :                       1940  ADMIT DATE:       2024 8:19 PM  DISCH DATE:  RESPONDING  PROVIDER #:        Mitchell Sánchez MD          QUERY TEXT:    Good afternoon.    Patient admitted with acute hypoxic respiratory failure secondary to   right-sided pleural effusion.  Noted to have mild malnutrition in    Registered Dietician note.    If possible, please document in progress notes and discharge summary if you   are evaluating and /or treating any of the following:    The medical record reflects the following:    Risk Factors:83 yr old male, right-sided pleural effusion, lung   nodules--concerning for malignancy, Afib, SVT, right-sided hydronephrosis--s/p   left nephrectomy, hx of prostate cancer, anemia of chronic disease, HTN, HLD,   persistent hiccups    Clinical Indicators:  Registered Dietician note: Malnutrition Assessment:   Malnutrition Status:  Mild malnutrition (24 1209) Context:  Acute   Illness Findings of the 6 clinical characteristics of malnutrition: Energy   Intake:  Unable to assess Weight Loss:  Unable to assess Body Fat Loss:    Unable to assess Muscle Mass Loss:  Mild muscle mass loss Temples   (temporalis), Hand (interosseous) Fluid Accumulation:  Unable to assess    Strength:  Not Performed\"; 24 14:20 Albumin: 2.8, 24 07:32   Albumin: 2.4, 08/15/24 05:49 Albumin: 2.1, 24 02:00 Albumin: 1.9,   24 01:23 Albumin: 1.9, 24 11:48  Albumin: 2.0; 24 14:20 Sodium: 133; 24 14:20 Potassium: 6.6,   24 20:57 Potassium: 5.5; 24 14:20  Creatinine: 4.16; 24 14:20 Hemoglobin Quant: 9.7, 24 06:24   Hemoglobin Quant: 8.6, 24 07:32 Hemoglobin Quant: 8.5, 24 05:53   Hemoglobin Quant: 8.1, 08/15/24 05:49 Hemoglobin Quant: 8.4, 24 02:54   Hemoglobin Quant: 8.1, 24 01:23 Hemoglobin Quant: 7.8,

## 2024-08-24 NOTE — PROGRESS NOTES
Pulmonary Progress Note    Subjective:     Patient seen and examined in his room on the floor this afternoon, no acute events overnight.  Saturating well on room air, hemoglobin stable, creatinine down slightly to 3.01 from 3.15, magnesium level now normal.  Net -1.15 L yesterday.  Went for stress testing today, results pending.  Pleural fluid cytology negative, no growth to date on pleural fluid culture.  Repeat chest x-ray today shows worsening right-sided pleural effusion.    Patient had right-sided chest tube placed yesterday.  1400 cc of dark hemorrhagic colored fluid drained out.    On negative suction over the weekend   Will repeat a CT scan of the chest Monday.  May require intrapleural lytics.        Patient Active Problem List   Diagnosis    Pleural effusion    Hydronephrosis        Allergies   Allergen Reactions    Naproxen Other (See Comments)     Gastric Ulcer With Hemorrhage.    Lisinopril Nausea And Vomiting    Morphine Rash        Review of Systems:  A comprehensive review of systems was negative except for that written in the History of Present Illness.    Labs:    Recent Labs     24  0356 24  0237 24  1755 24  0129   WBC 6.2 7.4  --  7.6   HGB 8.8* 8.2* 8.1* 7.7*   HCT 27.2* 25.6* 25.1* 23.6*    203  --  162     Recent Labs     24  0356 24  0237 24  0129    139 139   K 4.9 4.8 4.9    107 109*   CO2 24 23 21   GLUCOSE 115* 152* 113*   BUN 70* 68* 74*   CREATININE 3.15* 3.01* 3.01*   CALCIUM 9.2 9.2 9.1   MG 1.6 2.0 1.8   PHOS 4.1 4.0 4.4   BILITOT  --   --  0.2   AST  --   --  42*   ALT  --   --  37           Objective:   Blood pressure 119/78, pulse 87, temperature 97.7 °F (36.5 °C), temperature source Axillary, resp. rate 17, height 1.676 m (5' 6\"), weight 56.2 kg (124 lb), SpO2 98%.  Temp (24hrs), Av °F (36.7 °C), Min:97.7 °F (36.5 °C), Max:98.2 °F (36.8 °C)    XR CHEST PORTABLE   Final Result      Improved mild to moderate edema  outpatient  Interestingly, CT chest from 8/19/2024 does not demonstrate any evidence of nodules.  May have been infectious/inflammatory.  Awaiting culture/cytology and repeat thoracentesis from 8/20/2024    3.  Right lower chest pain:  Seems to be from right-sided pleural effusion.  He is on painkillers Dilaudid.    4.  Acute renal failure:  Seems to be at least partially from volume contraction/dehydration.  He has not been eating well for the last couple of weeks.  On admission his BUN was 4.6 and creatinine was 91.  With hydration it is gradually improving.  Nephrology consult and input was appreciated.  With hydration renal functions are gradually improving although creatinine down slightly to 3.15 today from 3.21 yesterday    5.  SVT  Developed SVT.  Given IV Cardizem bolus and patient got started on p.o. metoprolol  Will need cardiac stress testing and workup.  Appreciate assistance from cardiology team    6.  Right hydronephrosis  Patient is status post left nephrectomy in the past.  Will get cystoscopy once cleared by cardiology.    Questions of patient were answered at bedside in detail  Case discussed in detail with RN, RT, and care team  Thank you for involving me in the care of this patient  I will follow with you closely during hospitalization    Time spent more than 30 minutes under patient care with no overlap reviewing results and records, decision making, and answering questions.    BENNY Birch MD  [facility] ([facility])     This dictation was done by dragon, computer voice recognition software.  Often unanticipated grammatical, syntax, Wilbur phones and other interpretive errors are inadvertently transcribed.  Please excuse errors that have escaped final proofreading.

## 2024-08-25 ENCOUNTER — APPOINTMENT (OUTPATIENT)
Facility: HOSPITAL | Age: 84
DRG: 204 | End: 2024-08-25
Payer: OTHER GOVERNMENT

## 2024-08-25 LAB
ALBUMIN SERPL-MCNC: 1.9 G/DL (ref 3.5–5)
ALBUMIN SERPL-MCNC: 1.9 G/DL (ref 3.5–5)
ALBUMIN/GLOB SERPL: 0.5 (ref 1.1–2.2)
ALP SERPL-CCNC: 94 U/L (ref 45–117)
ALT SERPL-CCNC: 31 U/L (ref 12–78)
ANION GAP SERPL CALC-SCNC: 8 MMOL/L (ref 5–15)
AST SERPL W P-5'-P-CCNC: 35 U/L (ref 15–37)
BASOPHILS # BLD: 0.1 K/UL (ref 0–0.1)
BASOPHILS NFR BLD: 1 % (ref 0–1)
BILIRUB DIRECT SERPL-MCNC: <0.1 MG/DL (ref 0–0.2)
BILIRUB SERPL-MCNC: 0.2 MG/DL (ref 0.2–1)
BUN SERPL-MCNC: 77 MG/DL (ref 6–20)
BUN/CREAT SERPL: 25 (ref 12–20)
CA-I BLD-MCNC: 9.1 MG/DL (ref 8.5–10.1)
CHLORIDE SERPL-SCNC: 108 MMOL/L (ref 97–108)
CO2 SERPL-SCNC: 23 MMOL/L (ref 21–32)
CREAT SERPL-MCNC: 3.02 MG/DL (ref 0.7–1.3)
CRP SERPL-MCNC: 20.4 MG/DL (ref 0–0.3)
DIFFERENTIAL METHOD BLD: ABNORMAL
EOSINOPHIL # BLD: 0.2 K/UL (ref 0–0.4)
EOSINOPHIL NFR BLD: 2 % (ref 0–7)
ERYTHROCYTE [DISTWIDTH] IN BLOOD BY AUTOMATED COUNT: 19.7 % (ref 11.5–14.5)
GLOBULIN SER CALC-MCNC: 4.2 G/DL (ref 2–4)
GLUCOSE SERPL-MCNC: 107 MG/DL (ref 65–100)
HCT VFR BLD AUTO: 19.5 % (ref 36.6–50.3)
HGB BLD-MCNC: 6.1 G/DL (ref 12.1–17)
IMM GRANULOCYTES # BLD AUTO: 0.1 K/UL (ref 0–0.04)
IMM GRANULOCYTES NFR BLD AUTO: 1 % (ref 0–0.5)
LYMPHOCYTES # BLD: 0.5 K/UL (ref 0.8–3.5)
LYMPHOCYTES NFR BLD: 5 % (ref 12–49)
MAGNESIUM SERPL-MCNC: 1.8 MG/DL (ref 1.6–2.4)
MCH RBC QN AUTO: 30.2 PG (ref 26–34)
MCHC RBC AUTO-ENTMCNC: 31.3 G/DL (ref 30–36.5)
MCV RBC AUTO: 96.5 FL (ref 80–99)
MONOCYTES # BLD: 1.5 K/UL (ref 0–1)
MONOCYTES NFR BLD: 15 % (ref 5–13)
NEUTS SEG # BLD: 7.7 K/UL (ref 1.8–8)
NEUTS SEG NFR BLD: 76 % (ref 32–75)
NRBC # BLD: 0 K/UL (ref 0–0.01)
NRBC BLD-RTO: 0 PER 100 WBC
PHOSPHATE SERPL-MCNC: 4.9 MG/DL (ref 2.6–4.7)
PLATELET # BLD AUTO: 228 K/UL (ref 150–400)
PMV BLD AUTO: 10.4 FL (ref 8.9–12.9)
POTASSIUM SERPL-SCNC: 4.9 MMOL/L (ref 3.5–5.1)
PROCALCITONIN SERPL-MCNC: 1.47 NG/ML
PROT SERPL-MCNC: 6.1 G/DL (ref 6.4–8.2)
RBC # BLD AUTO: 2.02 M/UL (ref 4.1–5.7)
RBC MORPH BLD: ABNORMAL
SODIUM SERPL-SCNC: 139 MMOL/L (ref 136–145)
WBC # BLD AUTO: 10.1 K/UL (ref 4.1–11.1)

## 2024-08-25 PROCEDURE — 86140 C-REACTIVE PROTEIN: CPT

## 2024-08-25 PROCEDURE — 6370000000 HC RX 637 (ALT 250 FOR IP): Performed by: STUDENT IN AN ORGANIZED HEALTH CARE EDUCATION/TRAINING PROGRAM

## 2024-08-25 PROCEDURE — P9016 RBC LEUKOCYTES REDUCED: HCPCS

## 2024-08-25 PROCEDURE — 6370000000 HC RX 637 (ALT 250 FOR IP): Performed by: INTERNAL MEDICINE

## 2024-08-25 PROCEDURE — 86923 COMPATIBILITY TEST ELECTRIC: CPT

## 2024-08-25 PROCEDURE — 86900 BLOOD TYPING SEROLOGIC ABO: CPT

## 2024-08-25 PROCEDURE — 6360000002 HC RX W HCPCS: Performed by: INTERNAL MEDICINE

## 2024-08-25 PROCEDURE — 86901 BLOOD TYPING SEROLOGIC RH(D): CPT

## 2024-08-25 PROCEDURE — 36430 TRANSFUSION BLD/BLD COMPNT: CPT

## 2024-08-25 PROCEDURE — 85025 COMPLETE CBC W/AUTO DIFF WBC: CPT

## 2024-08-25 PROCEDURE — 2580000003 HC RX 258: Performed by: INTERNAL MEDICINE

## 2024-08-25 PROCEDURE — 80076 HEPATIC FUNCTION PANEL: CPT

## 2024-08-25 PROCEDURE — 6370000000 HC RX 637 (ALT 250 FOR IP)

## 2024-08-25 PROCEDURE — 71250 CT THORAX DX C-: CPT

## 2024-08-25 PROCEDURE — 83735 ASSAY OF MAGNESIUM: CPT

## 2024-08-25 PROCEDURE — 86850 RBC ANTIBODY SCREEN: CPT

## 2024-08-25 PROCEDURE — 2060000000 HC ICU INTERMEDIATE R&B

## 2024-08-25 PROCEDURE — 71045 X-RAY EXAM CHEST 1 VIEW: CPT

## 2024-08-25 PROCEDURE — 84145 PROCALCITONIN (PCT): CPT

## 2024-08-25 PROCEDURE — 80069 RENAL FUNCTION PANEL: CPT

## 2024-08-25 PROCEDURE — 36415 COLL VENOUS BLD VENIPUNCTURE: CPT

## 2024-08-25 RX ORDER — SODIUM CHLORIDE 9 MG/ML
INJECTION, SOLUTION INTRAVENOUS PRN
Status: DISCONTINUED | OUTPATIENT
Start: 2024-08-25 | End: 2024-08-28 | Stop reason: HOSPADM

## 2024-08-25 RX ADMIN — LOPERAMIDE HYDROCHLORIDE 2 MG: 2 CAPSULE ORAL at 14:02

## 2024-08-25 RX ADMIN — PRIMIDONE 100 MG: 50 TABLET ORAL at 08:00

## 2024-08-25 RX ADMIN — LOPERAMIDE HYDROCHLORIDE 2 MG: 2 CAPSULE ORAL at 02:09

## 2024-08-25 RX ADMIN — HEPARIN SODIUM 5000 UNITS: 5000 INJECTION INTRAVENOUS; SUBCUTANEOUS at 06:53

## 2024-08-25 RX ADMIN — ATORVASTATIN CALCIUM 20 MG: 20 TABLET, FILM COATED ORAL at 08:00

## 2024-08-25 RX ADMIN — EMPAGLIFLOZIN 10 MG: 10 TABLET, FILM COATED ORAL at 08:00

## 2024-08-25 RX ADMIN — PRIMIDONE 100 MG: 50 TABLET ORAL at 22:01

## 2024-08-25 RX ADMIN — CEFTRIAXONE SODIUM 2000 MG: 2 INJECTION, POWDER, FOR SOLUTION INTRAMUSCULAR; INTRAVENOUS at 13:51

## 2024-08-25 RX ADMIN — Medication 5 MG: at 22:00

## 2024-08-25 RX ADMIN — METOCLOPRAMIDE 10 MG: 10 TABLET ORAL at 06:53

## 2024-08-25 RX ADMIN — DOXYCYCLINE HYCLATE 100 MG: 100 CAPSULE ORAL at 22:01

## 2024-08-25 RX ADMIN — METOCLOPRAMIDE 10 MG: 10 TABLET ORAL at 11:22

## 2024-08-25 RX ADMIN — SODIUM CHLORIDE, PRESERVATIVE FREE 10 ML: 5 INJECTION INTRAVENOUS at 08:15

## 2024-08-25 RX ADMIN — MEGESTROL ACETATE 400 MG: 40 SUSPENSION ORAL at 07:59

## 2024-08-25 RX ADMIN — SODIUM BICARBONATE 650 MG: 650 TABLET ORAL at 13:56

## 2024-08-25 RX ADMIN — SODIUM CHLORIDE, PRESERVATIVE FREE 10 ML: 5 INJECTION INTRAVENOUS at 22:01

## 2024-08-25 RX ADMIN — CARVEDILOL 25 MG: 12.5 TABLET, FILM COATED ORAL at 16:58

## 2024-08-25 RX ADMIN — SODIUM BICARBONATE 650 MG: 650 TABLET ORAL at 08:00

## 2024-08-25 RX ADMIN — DOXYCYCLINE HYCLATE 100 MG: 100 CAPSULE ORAL at 08:00

## 2024-08-25 RX ADMIN — PIPERACILLIN AND TAZOBACTAM 3375 MG: 3; .375 INJECTION, POWDER, LYOPHILIZED, FOR SOLUTION INTRAVENOUS at 17:27

## 2024-08-25 RX ADMIN — CARVEDILOL 25 MG: 12.5 TABLET, FILM COATED ORAL at 08:00

## 2024-08-25 RX ADMIN — LORAZEPAM 1 MG: 1 TABLET ORAL at 22:01

## 2024-08-25 RX ADMIN — SODIUM BICARBONATE 650 MG: 650 TABLET ORAL at 22:00

## 2024-08-25 RX ADMIN — FOLIC ACID 1 MG: 1 TABLET ORAL at 08:00

## 2024-08-25 RX ADMIN — METOCLOPRAMIDE 10 MG: 10 TABLET ORAL at 16:59

## 2024-08-25 ASSESSMENT — PAIN SCALES - GENERAL
PAINLEVEL_OUTOF10: 0

## 2024-08-25 NOTE — PLAN OF CARE
Patient is alert and oriented, able to make needs known, no c/o pain or distress , eating and voiding wnl. Patient had chest xray today, as well as chest CT for chest tube. Patient HGB 6.1 , 2 units PRBC's transfused today repeat H&H at 1815. Patient on IV antibiotics, Call light and bedside table within reach. Pulmonology notified.      Problem: Discharge Planning  Goal: Discharge to home or other facility with appropriate resources  Outcome: Progressing     Problem: Pain  Goal: Verbalizes/displays adequate comfort level or baseline comfort level  Outcome: Progressing     Problem: Skin/Tissue Integrity  Goal: Absence of new skin breakdown  Description: 1.  Monitor for areas of redness and/or skin breakdown  2.  Assess vascular access sites hourly  3.  Every 4-6 hours minimum:  Change oxygen saturation probe site  4.  Every 4-6 hours:  If on nasal continuous positive airway pressure, respiratory therapy assess nares and determine need for appliance change or resting period.  Outcome: Progressing     Problem: ABCDS Injury Assessment  Goal: Absence of physical injury  Outcome: Progressing     Problem: Safety - Adult  Goal: Free from fall injury  Outcome: Progressing     Problem: Respiratory - Adult  Goal: Achieves optimal ventilation and oxygenation  Outcome: Progressing     Problem: Skin/Tissue Integrity - Adult  Goal: Skin integrity remains intact  Outcome: Progressing  Goal: Incisions, wounds, or drain sites healing without S/S of infection  Outcome: Progressing  Goal: Oral mucous membranes remain intact  Outcome: Progressing     Problem: Genitourinary - Adult  Goal: Absence of urinary retention  Outcome: Progressing  Goal: Urinary catheter remains patent  Outcome: Progressing     Problem: Metabolic/Fluid and Electrolytes - Adult  Goal: Electrolytes maintained within normal limits  Outcome: Progressing  Goal: Hemodynamic stability and optimal renal function maintained  Outcome: Progressing  Goal: Glucose maintained  within prescribed range  Outcome: Progressing     Problem: Neurosensory - Adult  Goal: Achieves stable or improved neurological status  Outcome: Progressing  Goal: Absence of seizures  Outcome: Progressing  Goal: Remains free of injury related to seizures activity  Outcome: Progressing  Goal: Achieves maximal functionality and self care  Outcome: Progressing     Problem: Cardiovascular - Adult  Goal: Maintains optimal cardiac output and hemodynamic stability  Outcome: Progressing  Goal: Absence of cardiac dysrhythmias or at baseline  Outcome: Progressing     Problem: Musculoskeletal - Adult  Goal: Return mobility to safest level of function  Outcome: Progressing  Goal: Maintain proper alignment of affected body part  Outcome: Progressing  Goal: Return ADL status to a safe level of function  Outcome: Progressing     Problem: Gastrointestinal - Adult  Goal: Minimal or absence of nausea and vomiting  Outcome: Progressing  Goal: Maintains or returns to baseline bowel function  Outcome: Progressing  Goal: Maintains adequate nutritional intake  Outcome: Progressing  Goal: Establish and maintain optimal ostomy function  Outcome: Progressing     Problem: Infection - Adult  Goal: Absence of infection at discharge  Outcome: Progressing  Goal: Absence of infection during hospitalization  Outcome: Progressing  Goal: Absence of fever/infection during anticipated neutropenic period  Outcome: Progressing     Problem: Hematologic - Adult  Goal: Maintains hematologic stability  Outcome: Progressing

## 2024-08-25 NOTE — PROGRESS NOTES
Hospitalist Progress Note               Daily Progress Note: 8/25/2024      Hospital Day: 15     Chief complaint:   Chief Complaint   Patient presents with    transfer of care        Subjective:   Hospital course to date:    This is an elderly male with known history of hypertension, history of left nephrectomy in the past for repeated recurrent kidney stones. He presented with symptoms of right lower chest discomfort on taking deep breath or coughing. Has reported weight loss of about 10 to 15 pounds in the last couple of months. Noted to have bilateral tiny pulmonary nodules along with moderate right-sided pleural effusion. He has history of smoking for 25 years before he quit smoking 20 years ago. Drinks regularly up until last 1 month when he quit drinking. He is noted to have elevated BUN and creatinine along with hemoglobin of 9.6, on admission.     Mr. Cruz is a pleasant gentleman with history of hypertension, hyperlipidemia, CKD s/p nephrectomy, prostate cancer, chronic anemia who presented to the ER with pleuritic chest pain and subsequent. CT showed diffuse pulmonary nodules and right pleural effusion. He is s/p right thoracentesis with final cultures pending    8/14/24 echocardiogram    Left Ventricle: Severely reduced left ventricular systolic function with a visually estimated EF of 30 - 35%. Left ventricle size is normal. Moderately increased wall thickness. Global hypokinesis present. Grade I diastolic dysfunction with normal LAP.    Right Ventricle: Mildly reduced systolic function.    Aorta: Normal sized aortic root. Mildly dilated ascending aorta. Ao ascending diameter is 3.9 cm.    Extracardiac: Right pleural effusion.    Image quality is fair.     8/22/24  Patient is seen today for follow-up.  Offers no complaints.  No reported fevers or chills.  [facility]    8/23/24 nuclear cardiac stress test    Stress Combined

## 2024-08-25 NOTE — PLAN OF CARE
Problem: Pain  Goal: Verbalizes/displays adequate comfort level or baseline comfort level  Outcome: Progressing     Problem: Skin/Tissue Integrity  Goal: Absence of new skin breakdown  Description: 1.  Monitor for areas of redness and/or skin breakdown  2.  Assess vascular access sites hourly  3.  Every 4-6 hours minimum:  Change oxygen saturation probe site  4.  Every 4-6 hours:  If on nasal continuous positive airway pressure, respiratory therapy assess nares and determine need for appliance change or resting period.  Outcome: Progressing     Problem: ABCDS Injury Assessment  Goal: Absence of physical injury  Outcome: Progressing     Problem: Safety - Adult  Goal: Free from fall injury  Outcome: Progressing     Problem: Respiratory - Adult  Goal: Achieves optimal ventilation and oxygenation  Outcome: Progressing     Problem: Genitourinary - Adult  Goal: Absence of urinary retention  Outcome: Progressing     Problem: Metabolic/Fluid and Electrolytes - Adult  Goal: Hemodynamic stability and optimal renal function maintained  Outcome: Progressing     Problem: Neurosensory - Adult  Goal: Achieves stable or improved neurological status  Outcome: Progressing     Problem: Neurosensory - Adult  Goal: Absence of seizures  Outcome: Progressing     Problem: Cardiovascular - Adult  Goal: Maintains optimal cardiac output and hemodynamic stability  Outcome: Progressing     Problem: Gastrointestinal - Adult  Goal: Minimal or absence of nausea and vomiting  Outcome: Progressing     Problem: Infection - Adult  Goal: Absence of infection at discharge  Outcome: Progressing     Problem: Hematologic - Adult  Goal: Maintains hematologic stability  Outcome: Progressing

## 2024-08-25 NOTE — PROGRESS NOTES
NAME:  Abel Cruz   :   1940   MRN:   [id number]     ATTENDING: Nils Malik Cha, MD  PCP:  Jeremy Grimes MD    Date/Time:  2024       Subjective:     -Patient seen in his room today.  Was comfortably resting and did not appear to be in any respiratory distress.    -Receiving PRBC transfusion today has had a steady decline in H&H over the past few days  -He had a chest tube placement on     Past Medical History:   Diagnosis Date    Hypertension       Past Surgical History:   Procedure Laterality Date    IR CHEST TUBE INSERTION  2024    IR CHEST TUBE INSERTION 2024 Huseyin Hamlin Jr., APRN - NP SSR RAD ANGIO IR    KIDNEY REMOVAL      over 20 years ago     Social History     Tobacco Use    Smoking status: Former     Types: Cigarettes    Smokeless tobacco: Not on file   Substance Use Topics    Alcohol use: Not on file      History reviewed. No pertinent family history.    Allergies   Allergen Reactions    Naproxen Other (See Comments)     Gastric Ulcer With Hemorrhage.    Lisinopril Nausea And Vomiting    Morphine Rash      Prior to Admission medications    Medication Sig Start Date End Date Taking? Authorizing Provider   latanoprost (XALATAN) 0.005 % ophthalmic solution Place 1 drop into both eyes nightly   Yes Mitesh Daily MD   Calcium Citrate-Vitamin D (SM CALCIUM CITRATE W/VIT D3 PO) Take 1,000 mg by mouth Daily   Yes Mitesh Daily MD   lidocaine (BLUE-EMU PAIN RELIEF DRY) 4 % external patch Place 1 patch onto the skin daily   Yes Mitesh Daily MD   ferrous sulfate (IRON 325) 325 (65 Fe) MG tablet Take 1 tablet by mouth daily (with breakfast)   Yes Mitesh Daily MD   atorvastatin (LIPITOR) 40 MG tablet Take 0.5 tablets by mouth daily 24  Yes Mitesh Daily MD   cyanocobalamin 500 MCG tablet Take 2 tablets by mouth daily   Yes Mitesh Daily MD   hydrALAZINE (APRESOLINE) 25 MG tablet Take 1 tablet by mouth 3 times daily   Yes Abimbola

## 2024-08-25 NOTE — PROGRESS NOTES
Pulmonary Progress Note    Subjective:     Patient seen and examined in his room on the floor this afternoon, no acute events overnight.  Saturating well on room air, hemoglobin stable, creatinine down slightly to 3.01 from 3.15, magnesium level now normal.  Net -1.15 L yesterday.  Went for stress testing today, results pending.  Pleural fluid cytology negative, no growth to date on pleural fluid culture.  Repeat chest x-ray today shows worsening right-sided pleural effusion.    Patient had right-sided chest tube placed .  1950 cc of dark hemorrhagic colored fluid drained out.    On negative suction over the weekend   Repeat CT scan of chest today revealed near complete resolution of right pleural effusion.   Will recommend to change negative suction bottle connected to the chest tube and remove chest tube in next 24 hours if drainage is less than 50 mL in 24-hour  This is hemorrhagic fluid and cytology is still saying processed/pending      Patient Active Problem List   Diagnosis    Pleural effusion    Hydronephrosis        Allergies   Allergen Reactions    Naproxen Other (See Comments)     Gastric Ulcer With Hemorrhage.    Lisinopril Nausea And Vomiting    Morphine Rash        Review of Systems:  A comprehensive review of systems was negative except for that written in the History of Present Illness.    Labs:    Recent Labs     08/23/24  0237 08/23/24  1755 08/24/24  0129 08/25/24  0637   WBC 7.4  --  7.6 10.1   HGB 8.2* 8.1* 7.7* 6.1*   HCT 25.6* 25.1* 23.6* 19.5*     --  162 228     Recent Labs     08/23/24  0237 08/24/24  0129 08/25/24  0637 08/25/24  0639    139 139  --    K 4.8 4.9 4.9  --     109* 108  --    CO2 23 21 23  --    GLUCOSE 152* 113* 107*  --    BUN 68* 74* 77*  --    CREATININE 3.01* 3.01* 3.02*  --    CALCIUM 9.2 9.1 9.1  --    MG 2.0 1.8 1.8  --    PHOS 4.0 4.4 4.9*  --    BILITOT  --  0.2  --  0.2   AST  --  42*  --  35   ALT  --  37  --  31           Objective:   Blood  anterior cervical area on the right side   Lungs:   Improved air entry at right lower half of the chest.    No wheezing was appreciated.  Currently on room air.   CV:  Regular rate and rhythm,no murmur, click, rub or gallop   Abdomen:   Soft, non-tender. bowel sounds normal. non-distended, scaphoid   Extremities: No cyanosis or edema   Skin: Skin color, texture, turgor normal. 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  He presented with symptoms of right lower chest discomfort on taking deep breath or coughing.  Has reported weight loss of about 10 to 15 pounds in the last couple of months.  Noted to have bilateral tiny pulmonary nodules along with moderate right-sided pleural effusion.  He has history of smoking for 25 years before he quit smoking 20 years ago.  Drinks regularly up until last 1 month when he quit drinking.  He is noted to have elevated BUN and creatinine along with hemoglobin of 9.6, on admission.    Plan:   1.  Moderate right pleural effusion,  Differential diagnosis includes benign versus malignant pleural effusion.  It is causing right lower chest discomfort.  He underwent ultrasound-guided right thoracentesis.  About 1500 cc of fluid was drained.  It was an exudative neutrophil predominant pleural effusion.  Which would typically raise the suspicion for infection.  Nevertheless cytology/culture negative.    Now saturating well on room air.    Status post 1.65 L thoracentesis earlier in the week with removal of serosanguineous fluid.  Labs consistent with a neutrophil predominant exudate, which is what was present in his previous

## 2024-08-26 ENCOUNTER — APPOINTMENT (OUTPATIENT)
Facility: HOSPITAL | Age: 84
DRG: 204 | End: 2024-08-26
Payer: OTHER GOVERNMENT

## 2024-08-26 LAB
ABO + RH BLD: NORMAL
ALBUMIN SERPL-MCNC: 1.9 G/DL (ref 3.5–5)
ALBUMIN SERPL-MCNC: 1.9 G/DL (ref 3.5–5)
ALBUMIN/GLOB SERPL: 0.5 (ref 1.1–2.2)
ALP SERPL-CCNC: 96 U/L (ref 45–117)
ALT SERPL-CCNC: 25 U/L (ref 12–78)
ANION GAP SERPL CALC-SCNC: 10 MMOL/L (ref 5–15)
AST SERPL W P-5'-P-CCNC: 25 U/L (ref 15–37)
BASOPHILS # BLD: 0 K/UL (ref 0–0.1)
BASOPHILS NFR BLD: 0 % (ref 0–1)
BILIRUB DIRECT SERPL-MCNC: <0.1 MG/DL (ref 0–0.2)
BILIRUB SERPL-MCNC: 0.2 MG/DL (ref 0.2–1)
BLD PROD TYP BPU: NORMAL
BLD PROD TYP BPU: NORMAL
BLOOD BANK BLOOD PRODUCT EXPIRATION DATE: NORMAL
BLOOD BANK BLOOD PRODUCT EXPIRATION DATE: NORMAL
BLOOD BANK DISPENSE STATUS: NORMAL
BLOOD BANK DISPENSE STATUS: NORMAL
BLOOD BANK ISBT PRODUCT BLOOD TYPE: 6200
BLOOD BANK ISBT PRODUCT BLOOD TYPE: 6200
BLOOD BANK PRODUCT CODE: NORMAL
BLOOD BANK PRODUCT CODE: NORMAL
BLOOD BANK UNIT TYPE AND RH: NORMAL
BLOOD BANK UNIT TYPE AND RH: NORMAL
BLOOD GROUP ANTIBODIES SERPL: NEGATIVE
BPU ID: NORMAL
BPU ID: NORMAL
BUN SERPL-MCNC: 72 MG/DL (ref 6–20)
BUN/CREAT SERPL: 25 (ref 12–20)
CA-I BLD-MCNC: 9.1 MG/DL (ref 8.5–10.1)
CHLORIDE SERPL-SCNC: 108 MMOL/L (ref 97–108)
CO2 SERPL-SCNC: 21 MMOL/L (ref 21–32)
CREAT SERPL-MCNC: 2.86 MG/DL (ref 0.7–1.3)
CROSSMATCH RESULT: NORMAL
CROSSMATCH RESULT: NORMAL
CRP SERPL-MCNC: 18.2 MG/DL (ref 0–0.3)
DIFFERENTIAL METHOD BLD: ABNORMAL
EOSINOPHIL # BLD: 0.1 K/UL (ref 0–0.4)
EOSINOPHIL NFR BLD: 1 % (ref 0–7)
ERYTHROCYTE [DISTWIDTH] IN BLOOD BY AUTOMATED COUNT: 20.4 % (ref 11.5–14.5)
GLOBULIN SER CALC-MCNC: 4.1 G/DL (ref 2–4)
GLUCOSE SERPL-MCNC: 109 MG/DL (ref 65–100)
HCT VFR BLD AUTO: 32 % (ref 36.6–50.3)
HGB BLD-MCNC: 10.3 G/DL (ref 12.1–17)
IMM GRANULOCYTES # BLD AUTO: 0.1 K/UL (ref 0–0.04)
IMM GRANULOCYTES NFR BLD AUTO: 1 % (ref 0–0.5)
LYMPHOCYTES # BLD: 0.4 K/UL (ref 0.8–3.5)
LYMPHOCYTES NFR BLD: 4 % (ref 12–49)
MAGNESIUM SERPL-MCNC: 1.7 MG/DL (ref 1.6–2.4)
MCH RBC QN AUTO: 29 PG (ref 26–34)
MCHC RBC AUTO-ENTMCNC: 32.2 G/DL (ref 30–36.5)
MCV RBC AUTO: 90.1 FL (ref 80–99)
MONOCYTES # BLD: 1.3 K/UL (ref 0–1)
MONOCYTES NFR BLD: 14 % (ref 5–13)
NEUTS SEG # BLD: 7.1 K/UL (ref 1.8–8)
NEUTS SEG NFR BLD: 80 % (ref 32–75)
NRBC # BLD: 0 K/UL (ref 0–0.01)
NRBC BLD-RTO: 0 PER 100 WBC
PHOSPHATE SERPL-MCNC: 5.1 MG/DL (ref 2.6–4.7)
PLATELET # BLD AUTO: 197 K/UL (ref 150–400)
PMV BLD AUTO: 10.2 FL (ref 8.9–12.9)
POTASSIUM SERPL-SCNC: 5.3 MMOL/L (ref 3.5–5.1)
PROCALCITONIN SERPL-MCNC: 1.32 NG/ML
PROT SERPL-MCNC: 6 G/DL (ref 6.4–8.2)
RBC # BLD AUTO: 3.55 M/UL (ref 4.1–5.7)
SODIUM SERPL-SCNC: 139 MMOL/L (ref 136–145)
SPECIMEN EXP DATE BLD: NORMAL
TRANSFUSION STATUS PATIENT QL: NORMAL
TRANSFUSION STATUS PATIENT QL: NORMAL
UNIT DIVISION: 0
UNIT DIVISION: 0
UNIT ISSUE DATE/TIME: NORMAL
UNIT ISSUE DATE/TIME: NORMAL
WBC # BLD AUTO: 9 K/UL (ref 4.1–11.1)

## 2024-08-26 PROCEDURE — 80076 HEPATIC FUNCTION PANEL: CPT

## 2024-08-26 PROCEDURE — 2580000003 HC RX 258: Performed by: INTERNAL MEDICINE

## 2024-08-26 PROCEDURE — 97530 THERAPEUTIC ACTIVITIES: CPT

## 2024-08-26 PROCEDURE — 6370000000 HC RX 637 (ALT 250 FOR IP)

## 2024-08-26 PROCEDURE — 6360000002 HC RX W HCPCS: Performed by: INTERNAL MEDICINE

## 2024-08-26 PROCEDURE — 36415 COLL VENOUS BLD VENIPUNCTURE: CPT

## 2024-08-26 PROCEDURE — 2060000000 HC ICU INTERMEDIATE R&B

## 2024-08-26 PROCEDURE — 83735 ASSAY OF MAGNESIUM: CPT

## 2024-08-26 PROCEDURE — 85025 COMPLETE CBC W/AUTO DIFF WBC: CPT

## 2024-08-26 PROCEDURE — 6370000000 HC RX 637 (ALT 250 FOR IP): Performed by: INTERNAL MEDICINE

## 2024-08-26 PROCEDURE — 86140 C-REACTIVE PROTEIN: CPT

## 2024-08-26 PROCEDURE — 80069 RENAL FUNCTION PANEL: CPT

## 2024-08-26 PROCEDURE — 71045 X-RAY EXAM CHEST 1 VIEW: CPT

## 2024-08-26 PROCEDURE — 6370000000 HC RX 637 (ALT 250 FOR IP): Performed by: STUDENT IN AN ORGANIZED HEALTH CARE EDUCATION/TRAINING PROGRAM

## 2024-08-26 PROCEDURE — 84145 PROCALCITONIN (PCT): CPT

## 2024-08-26 RX ADMIN — METOCLOPRAMIDE 10 MG: 10 TABLET ORAL at 16:44

## 2024-08-26 RX ADMIN — DOXYCYCLINE HYCLATE 100 MG: 100 CAPSULE ORAL at 08:58

## 2024-08-26 RX ADMIN — LORAZEPAM 1 MG: 1 TABLET ORAL at 23:34

## 2024-08-26 RX ADMIN — TRAMADOL HYDROCHLORIDE 50 MG: 50 TABLET ORAL at 12:21

## 2024-08-26 RX ADMIN — DOXYCYCLINE HYCLATE 100 MG: 100 CAPSULE ORAL at 21:24

## 2024-08-26 RX ADMIN — METOCLOPRAMIDE 10 MG: 10 TABLET ORAL at 06:19

## 2024-08-26 RX ADMIN — FOLIC ACID 1 MG: 1 TABLET ORAL at 08:58

## 2024-08-26 RX ADMIN — CARVEDILOL 25 MG: 12.5 TABLET, FILM COATED ORAL at 16:44

## 2024-08-26 RX ADMIN — PIPERACILLIN AND TAZOBACTAM 3375 MG: 3; .375 INJECTION, POWDER, LYOPHILIZED, FOR SOLUTION INTRAVENOUS at 16:41

## 2024-08-26 RX ADMIN — METOCLOPRAMIDE 10 MG: 10 TABLET ORAL at 10:49

## 2024-08-26 RX ADMIN — EMPAGLIFLOZIN 10 MG: 10 TABLET, FILM COATED ORAL at 08:59

## 2024-08-26 RX ADMIN — TRAMADOL HYDROCHLORIDE 50 MG: 50 TABLET ORAL at 03:01

## 2024-08-26 RX ADMIN — MEGESTROL ACETATE 400 MG: 40 SUSPENSION ORAL at 08:58

## 2024-08-26 RX ADMIN — SODIUM CHLORIDE, PRESERVATIVE FREE 10 ML: 5 INJECTION INTRAVENOUS at 08:59

## 2024-08-26 RX ADMIN — Medication 5 MG: at 23:34

## 2024-08-26 RX ADMIN — SODIUM ZIRCONIUM CYCLOSILICATE 10 G: 10 POWDER, FOR SUSPENSION ORAL at 10:49

## 2024-08-26 RX ADMIN — CARVEDILOL 25 MG: 12.5 TABLET, FILM COATED ORAL at 08:58

## 2024-08-26 RX ADMIN — ATORVASTATIN CALCIUM 20 MG: 20 TABLET, FILM COATED ORAL at 08:59

## 2024-08-26 RX ADMIN — SODIUM BICARBONATE 650 MG: 650 TABLET ORAL at 21:24

## 2024-08-26 RX ADMIN — SODIUM CHLORIDE, PRESERVATIVE FREE 10 ML: 5 INJECTION INTRAVENOUS at 21:25

## 2024-08-26 RX ADMIN — PIPERACILLIN AND TAZOBACTAM 3375 MG: 3; .375 INJECTION, POWDER, LYOPHILIZED, FOR SOLUTION INTRAVENOUS at 04:26

## 2024-08-26 RX ADMIN — PRIMIDONE 100 MG: 50 TABLET ORAL at 21:24

## 2024-08-26 RX ADMIN — SODIUM BICARBONATE 650 MG: 650 TABLET ORAL at 12:22

## 2024-08-26 RX ADMIN — PRIMIDONE 100 MG: 50 TABLET ORAL at 08:59

## 2024-08-26 RX ADMIN — SODIUM BICARBONATE 650 MG: 650 TABLET ORAL at 08:58

## 2024-08-26 ASSESSMENT — PAIN SCALES - GENERAL
PAINLEVEL_OUTOF10: 0
PAINLEVEL_OUTOF10: 2
PAINLEVEL_OUTOF10: 2
PAINLEVEL_OUTOF10: 0
PAINLEVEL_OUTOF10: 7
PAINLEVEL_OUTOF10: 9
PAINLEVEL_OUTOF10: 2

## 2024-08-26 ASSESSMENT — PAIN DESCRIPTION - ORIENTATION
ORIENTATION: RIGHT
ORIENTATION: RIGHT

## 2024-08-26 ASSESSMENT — PAIN DESCRIPTION - DESCRIPTORS
DESCRIPTORS: JABBING;SHOOTING
DESCRIPTORS: STABBING;DISCOMFORT

## 2024-08-26 ASSESSMENT — PAIN - FUNCTIONAL ASSESSMENT: PAIN_FUNCTIONAL_ASSESSMENT: PREVENTS OR INTERFERES SOME ACTIVE ACTIVITIES AND ADLS

## 2024-08-26 ASSESSMENT — PAIN DESCRIPTION - LOCATION
LOCATION: CHEST
LOCATION: CHEST;RIB CAGE

## 2024-08-26 NOTE — PROGRESS NOTES
Patient potassium is 5.3. Provider on call DR Sánchez Notified. Order received to maintain cardiac monitoring without new order.

## 2024-08-26 NOTE — PROGRESS NOTES
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 He presented with symptoms of right lower chest discomfort on taking deep breath or coughing. Has reported weight loss of about 10 to 15 pounds in the last couple of months. Noted to have bilateral tiny pulmonary nodules along with moderate right-sided pleural effusion. He has history of smoking for 25 years before he quit smoking 20 years ago. Drinks regularly up until last 1 month when he quit drinking. He is noted to have elevated BUN and creatinine along with hemoglobin of 9.6, on admission.     Mr. Cruz is a pleasant gentleman with history of hypertension, hyperlipidemia, CKD s/p nephrectomy, prostate cancer, chronic anemia who presented to the ER with pleuritic chest pain and subsequent. CT showed diffuse pulmonary nodules and right pleural effusion. He is s/p right thoracentesis with final cultures pending    8/14/24 echocardiogram    Left Ventricle: Severely reduced left ventricular systolic function with a visually estimated EF of 30 - 35%. Left ventricle size is normal. Moderately increased wall thickness. Global hypokinesis present. Grade I diastolic dysfunction with normal LAP.    Right Ventricle: Mildly reduced systolic function.    Aorta: Normal sized aortic root. Mildly dilated ascending aorta. Ao ascending diameter is 3.9 cm.    Extracardiac: Right pleural effusion.    Image quality is fair.     8/22/24  Patient is seen today for follow-up.  Offers no complaints.  No reported fevers or chills.  [facility]    8/23/24 nuclear cardiac stress test    Stress Combined

## 2024-08-26 NOTE — PLAN OF CARE
Problem: Infection - Adult  Goal: Absence of infection at discharge  8/25/2024 2358 by Shayla Lee RN  Outcome: Progressing  8/25/2024 1710 by Michelle Mustafa RN  Outcome: Progressing     Problem: Gastrointestinal - Adult  Goal: Establish and maintain optimal ostomy function  8/25/2024 2358 by Shayla Lee RN  Outcome: Progressing  8/25/2024 1710 by Michelle Mustafa RN  Outcome: Progressing     Problem: Gastrointestinal - Adult  Goal: Minimal or absence of nausea and vomiting  8/25/2024 2358 by Shayla Lee RN  Outcome: Progressing  8/25/2024 1710 by Michelle Mustafa RN  Outcome: Progressing     Problem: Cardiovascular - Adult  Goal: Absence of cardiac dysrhythmias or at baseline  8/25/2024 2358 by Shayla Lee RN  Outcome: Progressing  8/25/2024 1710 by Michelle Mustafa RN  Outcome: Progressing     Problem: Cardiovascular - Adult  Goal: Maintains optimal cardiac output and hemodynamic stability  8/25/2024 2358 by Shayla Lee RN  Outcome: Progressing  8/25/2024 1710 by Michelle Mustafa RN  Outcome: Progressing     Problem: Neurosensory - Adult  Goal: Absence of seizures  8/25/2024 2358 by Shayla Lee RN  Outcome: Progressing  8/25/2024 1710 by Michelle Mustafa RN  Outcome: Progressing     Problem: Neurosensory - Adult  Goal: Achieves stable or improved neurological status  8/25/2024 2358 by Shayla Lee RN  Outcome: Progressing  8/25/2024 1710 by Michelle Mustafa RN  Outcome: Progressing     Problem: Skin/Tissue Integrity - Adult  Goal: Oral mucous membranes remain intact  8/25/2024 2358 by Shayla Lee RN  Outcome: Progressing  8/25/2024 1710 by Michelle Mustafa RN  Outcome: Progressing     Problem: Skin/Tissue Integrity - Adult  Goal: Skin integrity remains intact  8/25/2024 2358 by Shayla Lee, RN  Outcome: Progressing  8/25/2024 1710 by Moon,

## 2024-08-26 NOTE — PROGRESS NOTES
GIOVANNA reviewed medical record and noted [facility] had not yet approved IRF at discharge. GIOVANNA called and spoke to Ana Bhagat at [facility]. Originally said she had not received updated clinicals but confirmation of faxed updated clinicals at 9;43 am on Friday 8/23/24. Ana looked in her office for paperwork and came back to say she found the 150 pages of updated clinicals. Ana asked that clinicals from all physicians that rounded be faxed to her. Ana notified that PT/OT have not been able to work with patient this weekend due to placement of chest tube and blood transfusions. Ana stated due to paitent not being medically clear for discharge they would not be able to evaluate. Once patient medically clear, the [facility] has 48 hours to respond on whether patient approved for IRF.     GIOVANNA will continue to send updated clinicals.

## 2024-08-26 NOTE — CONSULTS
Hematology/Oncology Consult    Patient: Abel Cruz MRN: [id number]     YOB: 1940  Age: 83 y.o.  Sex: male      HPI      Abel Cruz is a 83 y.o. male who is being seen for lung mass.    Patient reported to the emergency room complaining of right-sided flank pain has been going going for the last month. Patient's also had decreased oral intake being unable to keep any solid food down. Patient was found to have a pleural effusion along with renal failure prompting transfer from [place] to [facility]. Patient has also had an unintentional weight loss about 10 to 15 pounds in the last month along with his decreased appetite. Patient also admits to dark stool last week. Patient did drink alcohol daily up until a month ago, due to appetite loss he stopped drinking.     Patient has a history of prostate cancer. Patient is active with [facility] hematology, Dr. Sue, for iron deficiency being treated with intermittent IV iron infusions. [facility] ordered CT scans for malignancy evaluation of the beginning of this month. Patient lives in [place] and prefers his care there. Patient is underwent 2 thoracentesis with negative cytology throughout this admission. A third cytology was sent off to the lab due to dark hemorrhagic colored fluid being drained out, pending.    Past Medical History:   Diagnosis Date    Hypertension      Past Surgical History:   Procedure Laterality Date    IR CHEST TUBE INSERTION  8/23/2024    IR CHEST TUBE INSERTION 8/23/2024 Huseyin Hamlin Jr., APRN - NP SSR RAD ANGIO IR    KIDNEY REMOVAL      over 20 years ago      History reviewed. No pertinent family history.  Social History     Tobacco Use    Smoking status: Former     Types: Cigarettes    Smokeless tobacco: Not on file   Substance Use Topics    Alcohol use: Not on file      Current Facility-Administered Medications   Medication Dose Route Frequency Provider Last Rate Last Admin    0.9 % sodium chloride  pending  -Right pleural drainage catheter placed on 8/23      Anemia  -On chart review patient's HemOnc note states patient has a history of TOI and stage IV CKD  -Hgb currently improved at 10.3 g/dL, s/p 2 units of pRBC 8/25  -Patient had received 3 units of PRBC so far this admission; stool occult -8/22  -Iron studies show deficiency completed supplementation this admission, B12/Folate replete  -SPEP without monoclonal protein   -Continue to monitor for any bleeding, monitor CBC  -On Retacrit   -Transfuse for hemoglobin less than 7  -Reported Colonoscopy in 2022: unremarkable     Right hydronephrosis  -Uncertain cause, evaluated by urology and plan for cystoscopy and right retrograde pyelogram on 8/14 was canceled because of SVT   -urology stated follow up OP to discuss timing of diagnostic workup     Acute Kidney Injury   Electrolyte imbalances  -History of left nephrectomy; nephrology following    I have discussed the patient with Ana Key NP under my direct supervision. I have reviewed the note and agree with the assessment and plan of care as documented.   HPI and management plan have been reviewed and verified.    Signed By: ESTEBAN Galdamez - CNP     August 26, 2024

## 2024-08-26 NOTE — PROGRESS NOTES
Spoke with provider, Dr Sánchez, about red blood from chest tube and order received to hold heparin subcutaneous

## 2024-08-26 NOTE — PROGRESS NOTES
NAME:  Abel Cruz   :   1940   MRN:   [id number]     ATTENDING: Nils Malik Cha, MD  PCP:  Jeremy Grimes MD    Date/Time:  2024       Subjective:     Patient was seen at bedside eating breakfast. Reports discomfort at chest tube insertion site, otherwise no complaints. No e/o dyspnea, on RA. Bloody drainage in CT tubing.    Hemoglobin 10.3 s/p transfusion.    Past Medical History:   Diagnosis Date    Hypertension       Past Surgical History:   Procedure Laterality Date    IR CHEST TUBE INSERTION  2024    IR CHEST TUBE INSERTION 2024 Huseyin Hamlin Jr., APRN - NP SSR RAD ANGIO IR    KIDNEY REMOVAL      over 20 years ago     Social History     Tobacco Use    Smoking status: Former     Types: Cigarettes    Smokeless tobacco: Not on file   Substance Use Topics    Alcohol use: Not on file      History reviewed. No pertinent family history.    Allergies   Allergen Reactions    Naproxen Other (See Comments)     Gastric Ulcer With Hemorrhage.    Lisinopril Nausea And Vomiting    Morphine Rash      Prior to Admission medications    Medication Sig Start Date End Date Taking? Authorizing Provider   latanoprost (XALATAN) 0.005 % ophthalmic solution Place 1 drop into both eyes nightly   Yes Mitesh Daily MD   Calcium Citrate-Vitamin D (SM CALCIUM CITRATE W/VIT D3 PO) Take 1,000 mg by mouth Daily   Yes Mitesh Daily MD   lidocaine (BLUE-EMU PAIN RELIEF DRY) 4 % external patch Place 1 patch onto the skin daily   Yes Mitesh Daily MD   ferrous sulfate (IRON 325) 325 (65 Fe) MG tablet Take 1 tablet by mouth daily (with breakfast)   Yes Mitesh Daily MD   atorvastatin (LIPITOR) 40 MG tablet Take 0.5 tablets by mouth daily 24  Yes Mitesh Daily MD   cyanocobalamin 500 MCG tablet Take 2 tablets by mouth daily   Yes Mitesh Daily MD   hydrALAZINE (APRESOLINE) 25 MG tablet Take 1 tablet by mouth 3 times daily   Yes Mitesh Daily MD   megestrol  40 mg IV x 1, 8/19  CT showed large right pleural effusion, underwent thoracentesis, 8/20, 1.6 L removed, cell count favoring an infectious process, final cytology results pending  -Chest tube placed, 8/23, with resolution of right pleural effusion    7. Hydroureteronephrosis on the right kidney  Has been evaluated by urology and plan for cystoscopy and right retrograde pyelogram on 8/14 was canceled because of SVT  Procedure pending cardiology clearance    8.  A-fib RVR/HFrEF  Cardiology on board  Echo shows EF of 30 to 35%, global hypokinesis present, grade 1 diastolic dysfunction  Started on empagliflozin 10 mg daily, 8/21  Underwent stress test, 8/23, showed EF of 45%, MI+, recommended medical management     9. Severe hypoalbuminemia -dietary evaluation.    Signed: ESTEBAN Ramos - CNP      Addendum:    Rounds made along with Karina Robles NP  Patient seen and examined at bedside,   Labs and treatments reviewed  Plan discussed with NP  Reviewed NP's  notes, amended and agree with assessment and plan    Therese Merritt MD

## 2024-08-26 NOTE — PROGRESS NOTES
I will review the forms when I am back in the office 9/18/23   Pulmonary Progress Note    Subjective:     Patient seen and examined  Overnight events noted    Lying in bed comfortably  Arousable and answering simple questions  No acute distress  On room air    Pleural fluid cytology pending  Cytology  and  have been negative    Pleural fluid cytology from thoracentesis  for hemorrhagic fluid is not in the computer      Patient Active Problem List   Diagnosis    Pleural effusion    Hydronephrosis        Allergies   Allergen Reactions    Naproxen Other (See Comments)     Gastric Ulcer With Hemorrhage.    Lisinopril Nausea And Vomiting    Morphine Rash        Review of Systems:  A comprehensive review of systems was negative except for that written in the History of Present Illness.    Labs:    Recent Labs     24  0129 24  0637 24  0229   WBC 7.6 10.1 9.0   HGB 7.7* 6.1* 10.3*   HCT 23.6* 19.5* 32.0*    228 197     Recent Labs     24  0637 24  0639 24  0229    139  --  139   K 4.9 4.9  --  5.3*   * 108  --  108   CO2 21 23  --  21   GLUCOSE 113* 107*  --  109*   BUN 74* 77*  --  72*   CREATININE 3.01* 3.02*  --  2.86*   CALCIUM 9.1 9.1  --  9.1   MG 1.8 1.8  --  1.7   PHOS 4.4 4.9*  --  5.1*   BILITOT 0.2  --  0.2 0.2   AST 42*  --  35 25   ALT 37  --  31 25           Objective:   Blood pressure 125/78, pulse 83, temperature 97.7 °F (36.5 °C), temperature source Oral, resp. rate 18, height 1.676 m (5' 6\"), weight 58.8 kg (129 lb 10.1 oz), SpO2 98%.  Temp (24hrs), Av.4 °F (36.9 °C), Min:97.5 °F (36.4 °C), Max:99.1 °F (37.3 °C)    XR CHEST PORTABLE   Final Result      Unchanged loculated mild right pleural effusion with a right basilar chest tube   in place. Unchanged mild edema.         Electronically signed by GEORGIA SALAZAR      CT CHEST WO CONTRAST   Final Result   Near complete drainage of large right pleural effusion following thoracentesis   with placement of the drainage tube. Small right

## 2024-08-26 NOTE — PLAN OF CARE
PHYSICAL THERAPY REEVALUATION  Patient: Abel Cruz (83 y.o. male)  Date: 8/26/2024  Primary Diagnosis: Hyperkalemia [E87.5]  Pleural effusion [J90]  NETO (acute kidney injury) (HCC) [N17.9]  Procedure(s) (LRB):  CYSTOSCOPY, RETROGRADE PYELOGRAM, URETERAL STENT INSERTION (Right) 12 Days Post-Op   Precautions: General Precautions, Bed Alarm, Fall Risk                      Recommendations for nursing mobility: Out of bed to chair for meals, Encourage HEP in prep for ADLs/mobility; see handout for details, Frequent repositioning to prevent skin breakdown, Use of bed/chair alarm for safety, Use of BSC for toileting , AD and gt belt for bed to chair , and Assist x1    In place during session: Peripheral IV, Kim Catheter, Chest tube R side, with suction, and EKG/telemetry   Chart, physical therapy assessment, plan of care, and goals were reviewed.      ASSESSMENT  Patient initially seen for PT evaluation 8/18/2024 and 4 skilled PT sessions since evalution. Patient seen today for PT reevaluation s/t LOS, s/p chest tube placement. Patient A&O x4 with slow processing time and decreased alertness. Pt parial L s/l upon arrival, agreeable to session.      Based on the objective data described, the patient currently presents with impaired functional mobility, decreased independence in ADLs, impaired strength, decreased activity tolerance, poor safety awareness, and impaired balance. (See below for objective details and assist levels).    Overall pt tolerated session fair today, currently with no c/o pain throughout session. Pt requires increased assist with all mobility this date compared to recent therapy sessions. Pt completed bed mobility with add'l time and Kat for trunk support, VC for sequencing and hand placement on bed rail.  Pt demos fair sitting balance EOB, requires UE support on bed rails and frequent VC to maintain upright position. Pt completed sit <> stand and bed <> BSC with Kat for anterior weight shift,

## 2024-08-27 ENCOUNTER — APPOINTMENT (OUTPATIENT)
Facility: HOSPITAL | Age: 84
DRG: 204 | End: 2024-08-27
Payer: OTHER GOVERNMENT

## 2024-08-27 PROCEDURE — 71045 X-RAY EXAM CHEST 1 VIEW: CPT

## 2024-08-27 PROCEDURE — 6370000000 HC RX 637 (ALT 250 FOR IP): Performed by: INTERNAL MEDICINE

## 2024-08-27 PROCEDURE — 6360000002 HC RX W HCPCS: Performed by: INTERNAL MEDICINE

## 2024-08-27 PROCEDURE — 88305 TISSUE EXAM BY PATHOLOGIST: CPT

## 2024-08-27 PROCEDURE — 6370000000 HC RX 637 (ALT 250 FOR IP)

## 2024-08-27 PROCEDURE — 6370000000 HC RX 637 (ALT 250 FOR IP): Performed by: STUDENT IN AN ORGANIZED HEALTH CARE EDUCATION/TRAINING PROGRAM

## 2024-08-27 PROCEDURE — 2580000003 HC RX 258: Performed by: INTERNAL MEDICINE

## 2024-08-27 PROCEDURE — 2060000000 HC ICU INTERMEDIATE R&B

## 2024-08-27 PROCEDURE — 88112 CYTOPATH CELL ENHANCE TECH: CPT

## 2024-08-27 PROCEDURE — 97530 THERAPEUTIC ACTIVITIES: CPT

## 2024-08-27 RX ADMIN — CARVEDILOL 25 MG: 12.5 TABLET, FILM COATED ORAL at 17:14

## 2024-08-27 RX ADMIN — SODIUM BICARBONATE 650 MG: 650 TABLET ORAL at 14:25

## 2024-08-27 RX ADMIN — PIPERACILLIN AND TAZOBACTAM 3375 MG: 3; .375 INJECTION, POWDER, LYOPHILIZED, FOR SOLUTION INTRAVENOUS at 05:01

## 2024-08-27 RX ADMIN — FOLIC ACID 1 MG: 1 TABLET ORAL at 09:42

## 2024-08-27 RX ADMIN — PRIMIDONE 100 MG: 50 TABLET ORAL at 09:42

## 2024-08-27 RX ADMIN — SODIUM BICARBONATE 650 MG: 650 TABLET ORAL at 09:42

## 2024-08-27 RX ADMIN — METOCLOPRAMIDE 10 MG: 10 TABLET ORAL at 16:16

## 2024-08-27 RX ADMIN — CARVEDILOL 25 MG: 12.5 TABLET, FILM COATED ORAL at 09:42

## 2024-08-27 RX ADMIN — SODIUM CHLORIDE, PRESERVATIVE FREE 10 ML: 5 INJECTION INTRAVENOUS at 20:49

## 2024-08-27 RX ADMIN — Medication 5 MG: at 20:49

## 2024-08-27 RX ADMIN — BACLOFEN 5 MG: 10 TABLET ORAL at 02:30

## 2024-08-27 RX ADMIN — EPOETIN ALFA-EPBX 10000 UNITS: 10000 INJECTION, SOLUTION INTRAVENOUS; SUBCUTANEOUS at 17:19

## 2024-08-27 RX ADMIN — EMPAGLIFLOZIN 10 MG: 10 TABLET, FILM COATED ORAL at 09:42

## 2024-08-27 RX ADMIN — PIPERACILLIN AND TAZOBACTAM 3375 MG: 3; .375 INJECTION, POWDER, LYOPHILIZED, FOR SOLUTION INTRAVENOUS at 16:14

## 2024-08-27 RX ADMIN — ATORVASTATIN CALCIUM 20 MG: 20 TABLET, FILM COATED ORAL at 09:42

## 2024-08-27 RX ADMIN — METOCLOPRAMIDE 10 MG: 10 TABLET ORAL at 09:42

## 2024-08-27 RX ADMIN — SODIUM CHLORIDE, PRESERVATIVE FREE 10 ML: 5 INJECTION INTRAVENOUS at 09:42

## 2024-08-27 RX ADMIN — SODIUM BICARBONATE 650 MG: 650 TABLET ORAL at 20:49

## 2024-08-27 RX ADMIN — MEGESTROL ACETATE 400 MG: 40 SUSPENSION ORAL at 09:42

## 2024-08-27 RX ADMIN — METOCLOPRAMIDE 10 MG: 10 TABLET ORAL at 05:17

## 2024-08-27 RX ADMIN — TRAMADOL HYDROCHLORIDE 50 MG: 50 TABLET ORAL at 02:30

## 2024-08-27 RX ADMIN — LOPERAMIDE HYDROCHLORIDE 2 MG: 2 CAPSULE ORAL at 17:14

## 2024-08-27 RX ADMIN — PRIMIDONE 100 MG: 50 TABLET ORAL at 20:49

## 2024-08-27 ASSESSMENT — PAIN SCALES - GENERAL
PAINLEVEL_OUTOF10: 0
PAINLEVEL_OUTOF10: 0
PAINLEVEL_OUTOF10: 7
PAINLEVEL_OUTOF10: 0

## 2024-08-27 ASSESSMENT — PAIN DESCRIPTION - DESCRIPTORS: DESCRIPTORS: ACHING;DISCOMFORT

## 2024-08-27 ASSESSMENT — PAIN DESCRIPTION - LOCATION: LOCATION: BACK

## 2024-08-27 NOTE — PROGRESS NOTES
CM reviewed medical record and uploaded and faxed clinicals to [facility]. Patient still has chest tube intact with bloody drainage. [facility] will continue to follow and will make discharge planning decision once medically cleared.

## 2024-08-27 NOTE — PLAN OF CARE
Problem: Discharge Planning  Goal: Discharge to home or other facility with appropriate resources  Outcome: Progressing  Discharge to home or other facility with appropriate resources:   Identify barriers to discharge with patient and caregiver   Identify discharge learning needs (meds, wound care, etc)     Problem: Pain  Goal: Verbalizes/displays adequate comfort level or baseline comfort level  Outcome: Progressing     Problem: Skin/Tissue Integrity  Goal: Absence of new skin breakdown  Description: 1.  Monitor for areas of redness and/or skin breakdown  2.  Assess vascular access sites hourly  3.  Every 4-6 hours minimum:  Change oxygen saturation probe site  4.  Every 4-6 hours:  If on nasal continuous positive airway pressure, respiratory therapy assess nares and determine need for appliance change or resting period.  Outcome: Progressing     Problem: ABCDS Injury Assessment  Goal: Absence of physical injury  Outcome: Progressing     Problem: Safety - Adult  Goal: Free from fall injury  Outcome: Progressing     Problem: Respiratory - Adult  Goal: Achieves optimal ventilation and oxygenation  Outcome: Progressing  Achieves optimal ventilation and oxygenation:   Assess for changes in respiratory status   Assess for changes in mentation and behavior   Position to facilitate oxygenation and minimize respiratory effort     Problem: Skin/Tissue Integrity - Adult  Goal: Skin integrity remains intact  Outcome: Progressing  Skin Integrity Remains Intact:   Monitor for areas of redness and/or skin breakdown   Assess vascular access sites hourly  Goal: Incisions, wounds, or drain sites healing without S/S of infection  Outcome: Progressing  Incisions, Wounds, or Drain Sites Healing Without Sign and Symptoms of Infection: ADMISSION and DAILY: Assess and document risk factors for pressure ulcer development  Goal: Oral mucous membranes remain intact  Outcome: Progressing  Oral Mucous Membranes Remain Intact:   Assess oral

## 2024-08-27 NOTE — PROGRESS NOTES
Comprehensive Nutrition Assessment    Type and Reason for Visit:  Reassess    Nutrition Recommendations/Plan:   Continue Current Diet   Encourage adequate intake  Continnue ONS 2x/day  Monitor PO intake and BM in I/Os     Malnutrition Assessment:  Malnutrition Status:  Mild malnutrition (08/27/24 1401)    Context:  Acute Illness     Findings of the 6 clinical characteristics of malnutrition:  Energy Intake:  Mild decrease in energy intake (Comment)  Weight Loss:  Unable to assess     Body Fat Loss:  No significant body fat loss     Muscle Mass Loss:  Mild muscle mass loss Temples (temporalis)  Fluid Accumulation:  No significant fluid accumulation     Strength:  Not Performed    Nutrition Assessment:    RD assessed for MST 2. Pt sleeping at time of visit. Spoke w RN who reports PO intake > 50%. Plan to continue to monitor. Meds include atorvasatatin and folic acid. Abnormal labs include BUN 79, Cr 3.23, GFR 19, Glucose 140, Phos 5.1, Vit B12 > 2000, Albumin 2.4, H/H 8.5/26.1. 8/20/24: Rd f/u w pt at bedside who reports that appetite is \"getting there\" w ~25% PO intake from breakfast and > 50% of lunch eaten during visit. Plan to continue to monitor. Meds include atorvastatin, folic acid and metoclopramide. Abnormal labs include Ch 110, BUN 56, Cr 2.79, GFR 22, Glucose 11, Albumin 1.9, AST 53, H/H 7.8/23.9.       8/27/24: RD f/u w pt and tech at bedside. Pt reports appetite is coming back but per EMR PO intake < 25% from breakfast. Per Tech, drank ~ 50% ONS today. Per EMR, intake varied. PLan to continue to monitor. Meds include atorvastatin , folic acid and metoclopramide. Abnormal labs include K 5.3, BUN 72, Cr 2.86, GFR 21, Glu 109, Phos 5.1, Albumin 1.9, H/H 10.3/32.    Nutrition Related Findings:    NFPE deferred, observed as nourished. No N/V/C and denies dysphagia. Reports diarrhea w LBM 8/27. Wound Type:  (Puncture)       Current Nutrition Intake & Therapies:    Average Meal Intake: 1-25%, 0%  Average

## 2024-08-27 NOTE — PROGRESS NOTES
Hematology/Oncology   Progress Note    Patient: Abel Cruz MRN: [id number]     YOB: 1940  Age: 83 y.o.  Sex: male      Admit Date: 8/11/2024    LOS: 16 days     Reason for consult: Lung mass    Subjective:     Patient resting in bed, no acute distress. Lethargic. Bloody drainage in pleural catheter still present- continue measurements. CBC for today not yet resulted.     Review of Systems - Negative except when listed in HPI     Objective:     Vitals:    08/26/24 2049 08/26/24 2314 08/27/24 0647 08/27/24 0817   BP: 137/88 135/82 (!) 148/84 (!) 141/97   Pulse: 82 85 92 91   Resp: 18 18 18 20   Temp: 97.5 °F (36.4 °C) 97.5 °F (36.4 °C) 97.3 °F (36.3 °C) 97.9 °F (36.6 °C)   TempSrc: Oral Oral Oral Oral   SpO2: 100% 99% 99% 100%   Weight:       Height:          Physical Exam:   Constitutional:  Male. Looks chronically ill.  Eyes: Sclerae anicteric. Conjunctivae no pallor. Has bitemporal wasting  ENMT: Oral mucosa is moist. No mucositis, thrush, or petechiae.  Neck: No adenopathy. No thyromegaly.  Respiratory: Lungs are clear bilaterally. Has a right-sided pleural drainage catheter placed.  Cardiovascular: Regular rate and rhythm; no gallop or murmur.   Abdomen: Soft. Nontender. No hepatosplenomegaly. No guarding or rigidity. Bowel sounds present.  Extremities: No edema. No nodules are palpable.  Skin: No petechiae; no skin rash.  Neurologic: Alert and oriented x3.  Lines: dark hemorrhagic colored fluid draining out pleural drainage catheter    Lab/Data Review:  Recent Labs     08/25/24 0637 08/26/24 0229   WBC 10.1 9.0   HGB 6.1* 10.3*   HCT 19.5* 32.0*    197     Recent Labs     08/25/24  0637 08/25/24  0639 08/26/24 0229     --  139   K 4.9  --  5.3*     --  108   CO2 23  --  21   BUN 77*  --  72*   MG 1.8  --  1.7   PHOS 4.9*  --  5.1*   ALT  --  31 25     No results for input(s): \"PH\", \"PCO2\", \"PO2\", \"HCO3\", \"FIO2\" in the last 72 hours.  No results found for

## 2024-08-27 NOTE — PLAN OF CARE
OCCUPATIONAL THERAPY TREATMENT: WEEKLY REASSESSMENT    Patient: Abel Cruz (83 y.o. male)  Date: 8/27/2024  Primary Diagnosis: Hyperkalemia [E87.5]  Pleural effusion [J90]  NETO (acute kidney injury) (HCC) [N17.9]  Procedure(s) (LRB):  CYSTOSCOPY, RETROGRADE PYELOGRAM, URETERAL STENT INSERTION (Right) 13 Days Post-Op   Precautions: General Precautions, Bed Alarm, Fall Risk                Recommendations for nursing mobility: Out of bed to chair for meals, Encourage HEP in prep for ADLs/mobility; see handout for details, Use of bed/chair alarm for safety, Use of BSC for toileting , AD and gt belt for bed to chair , and Assist x2 for line management    In place during session: Peripheral IV, Kim Catheter, Chest tube R side, on suction, and EKG/telemetry   Chart, occupational therapy assessment, plan of care, and goals were reviewed.  ASSESSMENT  Patient initially seen for OT evaluation 8/20/24 and 2 skilled OT sessions since evalution. Patient seen today for OT reevaluation s/t LOS and chest tube placement 8/23/24. Patient A&O x 4. Pt sleeping semi supine in bed upon arrival, agreeable to session.      Based on the objective data described, the patient currently presents with impaired functional mobility, decreased independence in ADLs, impaired ability to perform high-level IADLs, impaired strength, poor body mechanics, decreased activity tolerance, poor safety awareness, impaired cognition, decreased command following, poor attention/concentration, impaired balance, and impaired posture. (See below for objective details and assist levels).    Overall pt tolerated session fair today, currently with c/o 1-2/10 pain at chest tube site. Pt now req'ing min-mod A with significant additional time for bed mobility and SPT EOB>BSC>EOB using RW. Pt had 1 LOB during transfer to [facility] req'ing min-mod A to correct. Pt now req's total A to don R sock while seated EOB s/t decreased sitting balance. Toileting routine completed

## 2024-08-27 NOTE — PROGRESS NOTES
Pulmonary Progress Note    Subjective:     Patient seen and examined  Overnight events noted    Lying in bed comfortably  Arousable and answering simple questions  No acute distress  On room air    Pleural fluid cytology pending  Cytology  and  have been negative    Pleural fluid cytology from thoracentesis  for hemorrhagic fluid is not in the computer      Patient Active Problem List   Diagnosis    Pleural effusion    Hydronephrosis        Allergies   Allergen Reactions    Naproxen Other (See Comments)     Gastric Ulcer With Hemorrhage.    Lisinopril Nausea And Vomiting    Morphine Rash        Review of Systems:  A comprehensive review of systems was negative except for that written in the History of Present Illness.    Labs:    Recent Labs     2437 24   WBC 10.1 9.0   HGB 6.1* 10.3*   HCT 19.5* 32.0*    197     Recent Labs     24     --  139   K 4.9  --  5.3*     --  108   CO2 23  --  21   GLUCOSE 107*  --  109*   BUN 77*  --  72*   CREATININE 3.02*  --  2.86*   CALCIUM 9.1  --  9.1   MG 1.8  --  1.7   PHOS 4.9*  --  5.1*   BILITOT  --  0.2 0.2   AST  --  35 25   ALT  --  31 25           Objective:   Blood pressure (!) 141/97, pulse 91, temperature 97.9 °F (36.6 °C), temperature source Oral, resp. rate 20, height 1.676 m (5' 6\"), weight 58.8 kg (129 lb 10.1 oz), SpO2 100%.  Temp (24hrs), Av.6 °F (36.4 °C), Min:97.3 °F (36.3 °C), Max:97.9 °F (36.6 °C)    XR CHEST PORTABLE   Final Result      Unchanged loculated mild right pleural effusion with a right basilar chest tube   in place. Unchanged mild edema.         Electronically signed by GEORGIA SALAZAR      CT CHEST WO CONTRAST   Final Result   Near complete drainage of large right pleural effusion following thoracentesis   with placement of the drainage tube. Small right hydropneumothorax   Improved right lower lobe lung expansion   Minimal left pleural effusion   Small  mg Oral BID WC    LORazepam (ATIVAN) tablet 1 mg  1 mg Oral Q6H PRN    [Held by provider] heparin (porcine) injection 5,000 Units  5,000 Units SubCUTAneous 3 times per day    metoclopramide (REGLAN) tablet 10 mg  10 mg Oral TID AC    melatonin tablet 5 mg  5 mg Oral Nightly PRN    epoetin paul-epbx (RETACRIT) injection 10,000 Units  10,000 Units SubCUTAneous Weekly    folic acid (FOLVITE) tablet 1 mg  1 mg Oral Daily    sodium chloride flush 0.9 % injection 5-40 mL  5-40 mL IntraVENous 2 times per day    sodium chloride flush 0.9 % injection 5-40 mL  5-40 mL IntraVENous PRN    0.9 % sodium chloride infusion   IntraVENous PRN    ondansetron (ZOFRAN-ODT) disintegrating tablet 4 mg  4 mg Oral Q8H PRN    Or    ondansetron (ZOFRAN) injection 4 mg  4 mg IntraVENous Q6H PRN    polyethylene glycol (GLYCOLAX) packet 17 g  17 g Oral Daily PRN    acetaminophen (TYLENOL) tablet 650 mg  650 mg Oral Q6H PRN    Or    acetaminophen (TYLENOL) suppository 650 mg  650 mg Rectal Q6H PRN    atorvastatin (LIPITOR) tablet 20 mg  20 mg Oral Daily    megestrol (MEGACE) 40 MG/ML suspension 400 mg  400 mg Oral Daily    primidone (MYSOLINE) tablet 100 mg  100 mg Oral BID        Exam:    General:  Alert, cooperative, thinly built.  Looks frail.     Eyes:  Sclera anicteric. Pupils equally round and reactive to light.   Mouth/Throat: Mucous membranes normal, oral pharynx clear   Neck: Supple, has lipomatous growths in the anterior cervical area on the right side   Lungs:   Improved air entry at right lower half of the chest.    No wheezing was appreciated.  Currently on room air.   CV:  Regular rate and rhythm,no murmur, click, rub or gallop   Abdomen:   Soft, non-tender. bowel sounds normal. non-distended, scaphoid   Extremities: No cyanosis or edema   Skin: Skin color, texture, turgor normal. no acute rash or lesions   Lymph nodes: Cervical and supraclavicular normal   Musculoskeletal: No swelling or deformity   Lines/Devices:  Intact, no

## 2024-08-27 NOTE — PROGRESS NOTES
suspension Take 10 mLs by mouth daily 7/29/24  Yes ProviderMitesh MD   primidone (MYSOLINE) 50 MG tablet Take 150 tablets by mouth 3 times daily 1/14/22  Yes Mitesh Daily MD   propranolol (INNOPRAN XL) 80 MG extended release capsule Take 1 capsule by mouth daily 4/30/24  Yes Mitesh Daily MD   amLODIPine (NORVASC) 10 MG tablet Take 1 tablet by mouth daily    ProviderMitesh MD     Current Facility-Administered Medications   Medication Dose Route Frequency    0.9 % sodium chloride infusion   IntraVENous PRN    piperacillin-tazobactam (ZOSYN) 3,375 mg in sodium chloride 0.9 % 50 mL IVPB (mini-bag)  3,375 mg IntraVENous Q12H    loperamide (IMODIUM) capsule 2 mg  2 mg Oral 4x Daily PRN    traMADol (ULTRAM) tablet 50 mg  50 mg Oral Q6H PRN    sodium bicarbonate tablet 650 mg  650 mg Oral TID    empagliflozin (JARDIANCE) tablet 10 mg  10 mg Oral Daily    0.9 % sodium chloride infusion   IntraVENous PRN    baclofen (LIORESAL) tablet 5 mg  5 mg Oral TID PRN    albuterol (PROVENTIL) nebulizer solution 2.5 mg  2.5 mg Nebulization Q4H PRN    metoprolol (LOPRESSOR) injection 2.5 mg  2.5 mg IntraVENous Q1H PRN    carvedilol (COREG) tablet 25 mg  25 mg Oral BID WC    LORazepam (ATIVAN) tablet 1 mg  1 mg Oral Q6H PRN    [Held by provider] heparin (porcine) injection 5,000 Units  5,000 Units SubCUTAneous 3 times per day    metoclopramide (REGLAN) tablet 10 mg  10 mg Oral TID AC    melatonin tablet 5 mg  5 mg Oral Nightly PRN    epoetin paul-epbx (RETACRIT) injection 10,000 Units  10,000 Units SubCUTAneous Weekly    folic acid (FOLVITE) tablet 1 mg  1 mg Oral Daily    sodium chloride flush 0.9 % injection 5-40 mL  5-40 mL IntraVENous 2 times per day    sodium chloride flush 0.9 % injection 5-40 mL  5-40 mL IntraVENous PRN    0.9 % sodium chloride infusion   IntraVENous PRN    ondansetron (ZOFRAN-ODT) disintegrating tablet 4 mg  4 mg Oral Q8H PRN    Or    ondansetron (ZOFRAN) injection 4 mg  4 mg  IntraVENous Q6H PRN    polyethylene glycol (GLYCOLAX) packet 17 g  17 g Oral Daily PRN    acetaminophen (TYLENOL) tablet 650 mg  650 mg Oral Q6H PRN    Or    acetaminophen (TYLENOL) suppository 650 mg  650 mg Rectal Q6H PRN    atorvastatin (LIPITOR) tablet 20 mg  20 mg Oral Daily    megestrol (MEGACE) 40 MG/ML suspension 400 mg  400 mg Oral Daily    primidone (MYSOLINE) tablet 100 mg  100 mg Oral BID       REVIEW OF SYSTEMS:     Complaints as mentioned in the history of presenting illness.   No other significant complaints on complete review of systems.    Objective:   VITALS:    /78   Pulse 80   Temp 98.4 °F (36.9 °C) (Oral)   Resp 18   Ht 1.676 m (5' 6\")   Wt 58.8 kg (129 lb 10.1 oz)   SpO2 100%   BMI 20.92 kg/m²   Temp (24hrs), Av.8 °F (36.6 °C), Min:97.3 °F (36.3 °C), Max:98.4 °F (36.9 °C)      PHYSICAL EXAM:   General: drowsy, well-oriented, no acute distress, ill-appearing.  HEENT: EOMI, no icterus, pallor+, pupils reactive, mucosa dry, normal inspection of ears and nose, throat clear.  Neck: Neck is supple, No JVD.  Chest Tube in pace, bloody aspirate +  Lungs: No evidence of respiratory distress  CVS: heart sounds normal, regular rate and rhythm.  GI: soft, nontender, normal BS.  Extremities: no clubbing, no cyanosis, no edema.  Neuro: Drowsy, oriented x3, moving all extremities well.  Skin: poor skin turgor, no skin rashes   .    LAB DATA REVIEWED:    No results found for this or any previous visit (from the past 24 hour(s)).      Assessment and plan:     1. Acute Kidney Injury on CKD with unilateral kidney  tomeka probably prerenal azotemia secondary to volume depletion/anemia/left hydroureteronephrosis, ?post-obstructive  Creatinine is 4.1 on admission, decreasing to 2.7 over the following several days. Now fluctuating between 2.7-3.2, appears to have stabilized around 3.0  Baseline is unknown but according to notes he has history of admission CKD 4  Urine is suggestive of UTI.  Quantify

## 2024-08-27 NOTE — PROGRESS NOTES
been introduced with the decrease in size of the right pleural effusion. No pneumothorax. Pulmonary edema has decreased as compared to the prior study. Underlying osteopenia.      Decreased right pleural effusion with chest tube in place. Improving pulmonary edema. Electronically signed by Anali Camargo    IR GUIDED THORACENTESIS PLEURAL    Result Date: 8/20/2024  PROCEDURE:  ULTRASOUND GUIDED THORACENTESIS HISTORY: WAYNE GARCIA is a 83 years old Male with right pleural effusion. :  Huseyin Hamlin NP ATTENDING:  Etienne Sena MD CONSENT:  After full discussion of the procedure, including risks, benefits and alternatives, both verbal and written consent were obtained. TECHNIQUE: A timeout was called to verify the correct patient, procedure, site and allergies. Preliminary ultrasound imaging of the right hemithorax demonstrated pleural effusion amenable to thoracentesis.  An appropriate site for thoracentesis was marked.  Images were archived to PACS.  The skin was prepped and draped in sterile fashion.  1% lidocaine was utilized for local anesthesia.  A small dermatotomy was made.  A centesis catheter needle was then inserted into the pleural space using direct sonographic guidance.  There was return of serosanguineous fluid through the needle.  The needle was removed and the catheter was placed to suction.  A total of 1650 ml of fluid was evacuated.  The catheter was removed.  Pressure was applied locally at the puncture site.  A dry sterile dressing was applied.  There were no immediate complications.  The patient tolerated the procedure without difficulty. ESTIMATED BLOOD LOSS:  < 1 ml SPECIMENS:  Fluid sample sent to the laboratory     Technically successful ultrasound guided right thoracentesis yielding 1650 ml of fluid.  A post procedure chest x-ray is pending. Electronically signed by ETIENNE SENA    XR CHEST PORTABLE    Result Date: 8/20/2024  EXAM:  XR CHEST PORTABLE INDICATION: post right  stress and rest phases of imaging. The defect appears to be probable infarction.    8/23/24 pulmonary  Consult IR for right sided chest tube placement, keep on -20 cm H2O suction over the weekend and repeat a CT scan of the chest either Sunday or Monday.  May require intrapleural lytics.    8/23/24 IR chest tube IMPRESSION:  Technically successful ultrasound guided right pleural drainage catheter  placement.  A post procedure chest x-ray is pending.    8/24/24 no new complaints, tolerating medications well  Chest tube management per pulmonary    8/25/24 increased bloody output from the chest tube  Hemoglobin 6.1 today  2 PRBC given    8/25/24 CT chest IMPRESSION:  Near complete drainage of large right pleural effusion following thoracentesis  with placement of the drainage tube. Small right hydropneumothorax  Improved right lower lobe lung expansion  Minimal left pleural effusion  Small amount of mucus in trachea  Lung hyperexpansion with multiple tiny micronodules bilaterally  Persistent right hydronephrosis and hydroureter, not specifically evaluated.  Dilated cardiomyopathy and coronary artery atherosclerosis    8/27/24 no new complaints, tolerating medications well  Maintain chest tube for now    MICROBIOLOGY    8/12/24 Thoracentesis  Negative  8/20/24 Thoracentesis  Negative    ASSESSMENT AND PLAN    Abnormal CT of the chest concerning for malignancy  Lung nodules  Right-sided pleural effusion  Acute hypoxic respiratory failure secondary to above, resolved, on room air  Pulmonology eval to patient and fill this could be metastatic disease from colon/kidney.  However, pulmonary arteries are tiny and not amenable for bronchoscopy, biopsy or CT-guided biopsy.  Will follow pleural fluid studies from right-sided thoracentesis to determine nature of pleural fluid.  Due to pleural cytology being negative pulmonology recommending outpatient PET scan for further evaluation of possible malignancy  -IR complete

## 2024-08-28 ENCOUNTER — APPOINTMENT (OUTPATIENT)
Facility: HOSPITAL | Age: 84
DRG: 204 | End: 2024-08-28
Payer: OTHER GOVERNMENT

## 2024-08-28 ENCOUNTER — HOSPITAL ENCOUNTER (INPATIENT)
Facility: HOSPITAL | Age: 84
LOS: 4 days | DRG: 180 | End: 2024-09-01
Attending: INTERNAL MEDICINE | Admitting: FAMILY MEDICINE
Payer: OTHER GOVERNMENT

## 2024-08-28 VITALS
SYSTOLIC BLOOD PRESSURE: 108 MMHG | BODY MASS INDEX: 20.83 KG/M2 | HEART RATE: 79 BPM | TEMPERATURE: 98.2 F | OXYGEN SATURATION: 100 % | WEIGHT: 129.63 LBS | DIASTOLIC BLOOD PRESSURE: 73 MMHG | RESPIRATION RATE: 14 BRPM | HEIGHT: 66 IN

## 2024-08-28 PROBLEM — I10 HYPERTENSION: Status: ACTIVE | Noted: 2024-08-28

## 2024-08-28 PROBLEM — I48.91 ATRIAL FIBRILLATION (HCC): Status: ACTIVE | Noted: 2024-08-28

## 2024-08-28 PROBLEM — E87.5 HYPERKALEMIA: Status: ACTIVE | Noted: 2024-08-28

## 2024-08-28 PROBLEM — J91.0 MALIGNANT PLEURAL EFFUSION: Status: ACTIVE | Noted: 2024-08-28

## 2024-08-28 PROBLEM — J90 PLEURAL EFFUSION, RIGHT: Status: ACTIVE | Noted: 2024-08-28

## 2024-08-28 PROBLEM — N17.9 AKI (ACUTE KIDNEY INJURY) (HCC): Status: ACTIVE | Noted: 2024-08-28

## 2024-08-28 PROBLEM — C61 PROSTATE CANCER (HCC): Status: ACTIVE | Noted: 2024-08-28

## 2024-08-28 PROBLEM — D64.9 CHRONIC ANEMIA: Status: ACTIVE | Noted: 2024-08-28

## 2024-08-28 PROBLEM — I50.20 HFREF (HEART FAILURE WITH REDUCED EJECTION FRACTION) (HCC): Status: ACTIVE | Noted: 2024-08-28

## 2024-08-28 PROBLEM — I21.9 MYOCARDIAL INFARCTION (HCC): Status: ACTIVE | Noted: 2024-08-28

## 2024-08-28 PROBLEM — R91.8 LUNG MASS: Status: ACTIVE | Noted: 2024-08-28

## 2024-08-28 LAB
ALBUMIN SERPL-MCNC: 1.8 G/DL (ref 3.5–5)
ANION GAP SERPL CALC-SCNC: 12 MMOL/L (ref 5–15)
BASOPHILS # BLD: 0.1 K/UL (ref 0–0.1)
BASOPHILS NFR BLD: 1 % (ref 0–1)
BUN SERPL-MCNC: 78 MG/DL (ref 6–20)
BUN/CREAT SERPL: 24 (ref 12–20)
CA-I BLD-MCNC: 8.7 MG/DL (ref 8.5–10.1)
CHLORIDE SERPL-SCNC: 106 MMOL/L (ref 97–108)
CO2 SERPL-SCNC: 18 MMOL/L (ref 21–32)
CREAT SERPL-MCNC: 3.23 MG/DL (ref 0.7–1.3)
CYTOLOGY-NON GYN: NORMAL
DIFFERENTIAL METHOD BLD: ABNORMAL
EOSINOPHIL # BLD: 0.2 K/UL (ref 0–0.4)
EOSINOPHIL NFR BLD: 2 % (ref 0–7)
ERYTHROCYTE [DISTWIDTH] IN BLOOD BY AUTOMATED COUNT: 19.2 % (ref 11.5–14.5)
GLUCOSE SERPL-MCNC: 121 MG/DL (ref 65–100)
HCT VFR BLD AUTO: 30.6 % (ref 36.6–50.3)
HGB BLD-MCNC: 9.9 G/DL (ref 12.1–17)
IMM GRANULOCYTES # BLD AUTO: 0.1 K/UL (ref 0–0.04)
IMM GRANULOCYTES NFR BLD AUTO: 1 % (ref 0–0.5)
LYMPHOCYTES # BLD: 0.4 K/UL (ref 0.8–3.5)
LYMPHOCYTES NFR BLD: 4 % (ref 12–49)
MCH RBC QN AUTO: 29.7 PG (ref 26–34)
MCHC RBC AUTO-ENTMCNC: 32.4 G/DL (ref 30–36.5)
MCV RBC AUTO: 91.9 FL (ref 80–99)
MONOCYTES # BLD: 1.5 K/UL (ref 0–1)
MONOCYTES NFR BLD: 13 % (ref 5–13)
NEUTS SEG # BLD: 8.6 K/UL (ref 1.8–8)
NEUTS SEG NFR BLD: 79 % (ref 32–75)
NRBC # BLD: 0 K/UL (ref 0–0.01)
NRBC BLD-RTO: 0 PER 100 WBC
PHOSPHATE SERPL-MCNC: 6.6 MG/DL (ref 2.6–4.7)
PLATELET # BLD AUTO: 200 K/UL (ref 150–400)
PMV BLD AUTO: 10.2 FL (ref 8.9–12.9)
POTASSIUM SERPL-SCNC: 5.1 MMOL/L (ref 3.5–5.1)
PROCALCITONIN SERPL-MCNC: 1.66 NG/ML
RBC # BLD AUTO: 3.33 M/UL (ref 4.1–5.7)
SODIUM SERPL-SCNC: 136 MMOL/L (ref 136–145)
WBC # BLD AUTO: 10.8 K/UL (ref 4.1–11.1)

## 2024-08-28 PROCEDURE — 2500000003 HC RX 250 WO HCPCS: Performed by: INTERNAL MEDICINE

## 2024-08-28 PROCEDURE — 2580000003 HC RX 258: Performed by: INTERNAL MEDICINE

## 2024-08-28 PROCEDURE — 85025 COMPLETE CBC W/AUTO DIFF WBC: CPT

## 2024-08-28 PROCEDURE — 6370000000 HC RX 637 (ALT 250 FOR IP)

## 2024-08-28 PROCEDURE — 2500000003 HC RX 250 WO HCPCS

## 2024-08-28 PROCEDURE — 2060000000 HC ICU INTERMEDIATE R&B

## 2024-08-28 PROCEDURE — 6370000000 HC RX 637 (ALT 250 FOR IP): Performed by: FAMILY MEDICINE

## 2024-08-28 PROCEDURE — 36415 COLL VENOUS BLD VENIPUNCTURE: CPT

## 2024-08-28 PROCEDURE — 6370000000 HC RX 637 (ALT 250 FOR IP): Performed by: INTERNAL MEDICINE

## 2024-08-28 PROCEDURE — 71045 X-RAY EXAM CHEST 1 VIEW: CPT

## 2024-08-28 PROCEDURE — 80069 RENAL FUNCTION PANEL: CPT

## 2024-08-28 PROCEDURE — 6370000000 HC RX 637 (ALT 250 FOR IP): Performed by: STUDENT IN AN ORGANIZED HEALTH CARE EDUCATION/TRAINING PROGRAM

## 2024-08-28 PROCEDURE — 6360000002 HC RX W HCPCS: Performed by: INTERNAL MEDICINE

## 2024-08-28 PROCEDURE — 2580000003 HC RX 258: Performed by: FAMILY MEDICINE

## 2024-08-28 PROCEDURE — 84145 PROCALCITONIN (PCT): CPT

## 2024-08-28 RX ORDER — ONDANSETRON 2 MG/ML
4 INJECTION INTRAMUSCULAR; INTRAVENOUS EVERY 6 HOURS PRN
Status: DISCONTINUED | OUTPATIENT
Start: 2024-08-28 | End: 2024-09-02 | Stop reason: HOSPADM

## 2024-08-28 RX ORDER — ONDANSETRON 4 MG/1
4 TABLET, ORALLY DISINTEGRATING ORAL EVERY 8 HOURS PRN
Status: DISCONTINUED | OUTPATIENT
Start: 2024-08-28 | End: 2024-09-01

## 2024-08-28 RX ORDER — LIDOCAINE 4 G/G
1 PATCH TOPICAL DAILY
Status: DISCONTINUED | OUTPATIENT
Start: 2024-08-29 | End: 2024-09-02 | Stop reason: HOSPADM

## 2024-08-28 RX ORDER — POLYETHYLENE GLYCOL 3350 17 G/17G
17 POWDER, FOR SOLUTION ORAL DAILY PRN
Status: DISCONTINUED | OUTPATIENT
Start: 2024-08-28 | End: 2024-09-01

## 2024-08-28 RX ORDER — LATANOPROST 50 UG/ML
1 SOLUTION/ DROPS OPHTHALMIC NIGHTLY
Status: DISCONTINUED | OUTPATIENT
Start: 2024-08-28 | End: 2024-09-02 | Stop reason: HOSPADM

## 2024-08-28 RX ORDER — PRIMIDONE 50 MG/1
100 TABLET ORAL 2 TIMES DAILY
Status: DISCONTINUED | OUTPATIENT
Start: 2024-08-28 | End: 2024-09-01

## 2024-08-28 RX ORDER — SODIUM CHLORIDE 0.9 % (FLUSH) 0.9 %
5-40 SYRINGE (ML) INJECTION PRN
Status: DISCONTINUED | OUTPATIENT
Start: 2024-08-28 | End: 2024-09-02 | Stop reason: HOSPADM

## 2024-08-28 RX ORDER — ACETAMINOPHEN 325 MG/1
650 TABLET ORAL EVERY 6 HOURS PRN
Status: DISCONTINUED | OUTPATIENT
Start: 2024-08-28 | End: 2024-09-01

## 2024-08-28 RX ORDER — HYDRALAZINE HYDROCHLORIDE 25 MG/1
25 TABLET, FILM COATED ORAL 3 TIMES DAILY
Status: DISCONTINUED | OUTPATIENT
Start: 2024-08-28 | End: 2024-09-01

## 2024-08-28 RX ORDER — SODIUM CHLORIDE 9 MG/ML
INJECTION, SOLUTION INTRAVENOUS PRN
Status: DISCONTINUED | OUTPATIENT
Start: 2024-08-28 | End: 2024-09-01

## 2024-08-28 RX ORDER — ATORVASTATIN CALCIUM 20 MG/1
20 TABLET, FILM COATED ORAL DAILY
Status: DISCONTINUED | OUTPATIENT
Start: 2024-08-29 | End: 2024-08-29

## 2024-08-28 RX ORDER — SODIUM BICARBONATE 650 MG/1
1300 TABLET ORAL 2 TIMES DAILY
Status: DISCONTINUED | OUTPATIENT
Start: 2024-08-28 | End: 2024-08-28 | Stop reason: HOSPADM

## 2024-08-28 RX ORDER — TRAMADOL HYDROCHLORIDE 50 MG/1
50 TABLET ORAL EVERY 6 HOURS PRN
Status: DISCONTINUED | OUTPATIENT
Start: 2024-08-28 | End: 2024-08-30 | Stop reason: SDUPTHER

## 2024-08-28 RX ORDER — ACETAMINOPHEN 650 MG/1
650 SUPPOSITORY RECTAL EVERY 6 HOURS PRN
Status: DISCONTINUED | OUTPATIENT
Start: 2024-08-28 | End: 2024-09-02 | Stop reason: HOSPADM

## 2024-08-28 RX ORDER — FERROUS SULFATE 325(65) MG
325 TABLET ORAL
Status: DISCONTINUED | OUTPATIENT
Start: 2024-08-29 | End: 2024-09-01

## 2024-08-28 RX ORDER — SODIUM CHLORIDE 0.9 % (FLUSH) 0.9 %
5-40 SYRINGE (ML) INJECTION EVERY 12 HOURS SCHEDULED
Status: DISCONTINUED | OUTPATIENT
Start: 2024-08-28 | End: 2024-09-02 | Stop reason: HOSPADM

## 2024-08-28 RX ORDER — AMLODIPINE BESYLATE 5 MG/1
10 TABLET ORAL DAILY
Status: DISCONTINUED | OUTPATIENT
Start: 2024-08-29 | End: 2024-09-01

## 2024-08-28 RX ORDER — MAGNESIUM SULFATE HEPTAHYDRATE 40 MG/ML
2000 INJECTION, SOLUTION INTRAVENOUS ONCE
Status: COMPLETED | OUTPATIENT
Start: 2024-08-28 | End: 2024-08-28

## 2024-08-28 RX ORDER — UREA 10 %
1000 LOTION (ML) TOPICAL DAILY
Status: DISCONTINUED | OUTPATIENT
Start: 2024-08-29 | End: 2024-09-01

## 2024-08-28 RX ORDER — PROPRANOLOL HYDROCHLORIDE 80 MG/1
80 CAPSULE, EXTENDED RELEASE ORAL DAILY
Status: DISCONTINUED | OUTPATIENT
Start: 2024-08-29 | End: 2024-08-29

## 2024-08-28 RX ADMIN — CARVEDILOL 25 MG: 12.5 TABLET, FILM COATED ORAL at 17:39

## 2024-08-28 RX ADMIN — TRAMADOL HYDROCHLORIDE 50 MG: 50 TABLET ORAL at 22:10

## 2024-08-28 RX ADMIN — HYDRALAZINE HYDROCHLORIDE 25 MG: 25 TABLET ORAL at 22:51

## 2024-08-28 RX ADMIN — LATANOPROST 1 DROP: 50 SOLUTION OPHTHALMIC at 22:52

## 2024-08-28 RX ADMIN — METOCLOPRAMIDE 10 MG: 10 TABLET ORAL at 17:39

## 2024-08-28 RX ADMIN — PRIMIDONE 100 MG: 50 TABLET ORAL at 22:51

## 2024-08-28 RX ADMIN — EMPAGLIFLOZIN 10 MG: 10 TABLET, FILM COATED ORAL at 10:08

## 2024-08-28 RX ADMIN — PIPERACILLIN AND TAZOBACTAM 3375 MG: 3; .375 INJECTION, POWDER, LYOPHILIZED, FOR SOLUTION INTRAVENOUS at 04:46

## 2024-08-28 RX ADMIN — METOCLOPRAMIDE 10 MG: 10 TABLET ORAL at 06:29

## 2024-08-28 RX ADMIN — TRAMADOL HYDROCHLORIDE 50 MG: 50 TABLET ORAL at 05:09

## 2024-08-28 RX ADMIN — CARVEDILOL 25 MG: 12.5 TABLET, FILM COATED ORAL at 10:08

## 2024-08-28 RX ADMIN — SODIUM BICARBONATE 50 MEQ: 84 INJECTION, SOLUTION INTRAVENOUS at 14:24

## 2024-08-28 RX ADMIN — SODIUM CHLORIDE, PRESERVATIVE FREE 10 ML: 5 INJECTION INTRAVENOUS at 10:12

## 2024-08-28 RX ADMIN — MEGESTROL ACETATE 400 MG: 40 SUSPENSION ORAL at 10:09

## 2024-08-28 RX ADMIN — SODIUM CHLORIDE, PRESERVATIVE FREE 10 ML: 5 INJECTION INTRAVENOUS at 22:55

## 2024-08-28 RX ADMIN — MAGNESIUM SULFATE HEPTAHYDRATE 2000 MG: 40 INJECTION, SOLUTION INTRAVENOUS at 14:20

## 2024-08-28 RX ADMIN — ATORVASTATIN CALCIUM 20 MG: 20 TABLET, FILM COATED ORAL at 10:09

## 2024-08-28 RX ADMIN — FOLIC ACID 1 MG: 1 TABLET ORAL at 10:08

## 2024-08-28 RX ADMIN — SODIUM BICARBONATE 650 MG: 650 TABLET ORAL at 10:08

## 2024-08-28 RX ADMIN — METOCLOPRAMIDE 10 MG: 10 TABLET ORAL at 10:08

## 2024-08-28 RX ADMIN — PIPERACILLIN AND TAZOBACTAM 3375 MG: 3; .375 INJECTION, POWDER, LYOPHILIZED, FOR SOLUTION INTRAVENOUS at 17:45

## 2024-08-28 RX ADMIN — TRAMADOL HYDROCHLORIDE 50 MG: 50 TABLET ORAL at 10:08

## 2024-08-28 RX ADMIN — PRIMIDONE 100 MG: 50 TABLET ORAL at 10:08

## 2024-08-28 ASSESSMENT — PAIN SCALES - GENERAL
PAINLEVEL_OUTOF10: 7
PAINLEVEL_OUTOF10: 0
PAINLEVEL_OUTOF10: 8
PAINLEVEL_OUTOF10: 8

## 2024-08-28 ASSESSMENT — PAIN DESCRIPTION - LOCATION
LOCATION: CHEST
LOCATION: BACK;LEG
LOCATION: CHEST

## 2024-08-28 ASSESSMENT — PAIN DESCRIPTION - DESCRIPTORS
DESCRIPTORS: ACHING
DESCRIPTORS: SORE
DESCRIPTORS: ACHING;DULL

## 2024-08-28 ASSESSMENT — PAIN DESCRIPTION - ORIENTATION
ORIENTATION: RIGHT
ORIENTATION: RIGHT;UPPER

## 2024-08-28 ASSESSMENT — PAIN SCALES - WONG BAKER: WONGBAKER_NUMERICALRESPONSE: NO HURT

## 2024-08-28 NOTE — PROGRESS NOTES
4 Eyes Skin Assessment     NAME:  Abel Cruz  YOB: 1940  MEDICAL RECORD NUMBER:  [id number]    The patient is being assessed for  Other - weekly skin assessment    I agree that at least one RN has performed a thorough Head to Toe Skin Assessment on the patient. ALL assessment sites listed below have been assessed.      Areas assessed by both nurses:    Head, Face, Ears, Shoulders, Back, Chest, Arms, Elbows, Hands, Sacrum. Buttock, Coccyx, Ischium, Legs. Feet and Heels, and Under Medical Devices         Does the Patient have a Wound? Yes wound(s) were present on assessment. LDA wound assessment was Initiated and completed by RN  Right thoracentesis site  Right chest tube present    Raymon Prevention initiated by RN: Yes  Wound Care Orders initiated by RN: No    Pressure Injury (Stage 3,4, Unstageable, DTI, NWPT, and Complex wounds) if present, place Wound referral order by RN under : No    New Ostomies, if present place, Ostomy referral order under : No     Nurse 1 eSignature: Electronically signed by Chloe Orellana RN on 8/28/24 at 6:38 AM EDT    **SHARE this note so that the co-signing nurse can place an eSignature**    Nurse 2 eSignature: Electronically signed by Dariela Sheridan RN on 8/28/24 at 6:41 AM EDT

## 2024-08-28 NOTE — PROGRESS NOTES
Patient to transfer to [facility] when bed is available.

## 2024-08-28 NOTE — PROGRESS NOTES
Hematology/Oncology   Progress Note    Patient: Abel Cruz MRN: [id number]     YOB: 1940  Age: 83 y.o.  Sex: male      Admit Date: 8/11/2024    LOS: 17 days     Reason for consult: Lung mass    Subjective:     Patient resting in bed, no acute distress. Lethargic. Bloody drainage in pleural catheter still present- continue measurements.   No CBC since 8/26, spoke with primary RN about importance of needing CBC.    Review of Systems - Negative except when listed in HPI     Objective:     Vitals:    08/28/24 0313 08/28/24 0509 08/28/24 0539 08/28/24 0920   BP: 133/78   130/87   Pulse: 89   96   Resp: 18 18 18 18   Temp: 98.2 °F (36.8 °C)   97.7 °F (36.5 °C)   TempSrc: Oral   Oral   SpO2: 100%   99%   Weight:       Height:          Physical Exam:   Constitutional:  Male. Looks chronically ill.  Eyes: Sclerae anicteric. Conjunctivae no pallor. Has bitemporal wasting  ENMT: Oral mucosa is moist. No mucositis, thrush, or petechiae.  Neck: No adenopathy. No thyromegaly.  Respiratory: Lungs are clear bilaterally. Has a right-sided pleural drainage catheter placed.  Cardiovascular: Regular rate and rhythm; no gallop or murmur.   Abdomen: Soft. Nontender. No hepatosplenomegaly. No guarding or rigidity. Bowel sounds present.  Extremities: No edema. No nodules are palpable.  Skin: No petechiae; no skin rash.  Neurologic: Alert and oriented x3.  Lines: dark hemorrhagic colored fluid draining out pleural drainage catheter    Lab/Data Review:  Recent Labs     08/26/24 0229   WBC 9.0   HGB 10.3*   HCT 32.0*        Recent Labs     08/26/24 0229      K 5.3*      CO2 21   BUN 72*   MG 1.7   PHOS 5.1*   ALT 25     No results for input(s): \"PH\", \"PCO2\", \"PO2\", \"HCO3\", \"FIO2\" in the last 72 hours.  No results found for this or any previous visit (from the past 24 hour(s)).     Assessment and Plan:     Lung nodules  -Patient follows with Dr. Sue with [facility] in [place]

## 2024-08-28 NOTE — PROGRESS NOTES
NAME:  Abel Cruz   :   1940   MRN:   [id number]     ATTENDING: Nils Malik Cha, MD  PCP:  Jeremy Grimes MD    Date/Time:  2024       Subjective:     Patient was seen at bedside. Appears very lethargic, weak, slow to respond but answers questions appropriately. Reports uncontrolled pain at CT insertion site. Bloody drainage in CT tubing. Denies shortness of breath, no swelling in extremities.     Pending transfer to [facility] for possible VATS procedure.     Past Medical History:   Diagnosis Date    Hypertension       Past Surgical History:   Procedure Laterality Date    IR CHEST TUBE INSERTION  2024    IR CHEST TUBE INSERTION 2024 Huseyin Hamlin Jr., APRN - NP [facility] RAD ANGIO IR    KIDNEY REMOVAL      over 20 years ago     Social History     Tobacco Use    Smoking status: Former     Types: Cigarettes    Smokeless tobacco: Not on file   Substance Use Topics    Alcohol use: Not on file      History reviewed. No pertinent family history.    Allergies   Allergen Reactions    Naproxen Other (See Comments)     Gastric Ulcer With Hemorrhage.    Lisinopril Nausea And Vomiting    Morphine Rash      Prior to Admission medications    Medication Sig Start Date End Date Taking? Authorizing Provider   latanoprost (XALATAN) 0.005 % ophthalmic solution Place 1 drop into both eyes nightly   Yes Mitesh Daily MD   Calcium Citrate-Vitamin D (SM CALCIUM CITRATE W/VIT D3 PO) Take 1,000 mg by mouth Daily   Yes Mitesh Daily MD   lidocaine (BLUE-EMU PAIN RELIEF DRY) 4 % external patch Place 1 patch onto the skin daily   Yes Mitesh Daily MD   ferrous sulfate (IRON 325) 325 (65 Fe) MG tablet Take 1 tablet by mouth daily (with breakfast)   Yes Mitesh Daily MD   atorvastatin (LIPITOR) 40 MG tablet Take 0.5 tablets by mouth daily 24  Yes Mitesh Daily MD   cyanocobalamin 500 MCG tablet Take 2 tablets by mouth daily   Yes Mitesh Daily MD   hydrALAZINE  1 diastolic dysfunction  Started on empagliflozin 10 mg daily, 8/21  Underwent stress test, 8/23, showed EF of 45%, MI+, recommended medical management     9. Severe hypoalbuminemia -dietary evaluation.    Signed: ESTEBAN Ramos - CNP  Addendum:    Rounds made along with Karina Robles NP  Patient seen and examined at bedside,   Labs and treatments reviewed  Plan discussed with NP  Reviewed NP's  notes, amended and agree with assessment and plan     Therese Merritt MD

## 2024-08-28 NOTE — PROGRESS NOTES
normal, oral pharynx clear   Neck: Supple, has lipomatous growths in the anterior cervical area on the right side   Lungs:   Improved air entry at right lower half of the chest.    No wheezing was appreciated.  Currently on room air.   CV:  Regular rate and rhythm,no murmur, click, rub or gallop   Abdomen:   Soft, non-tender. bowel sounds normal. non-distended, scaphoid   Extremities: No cyanosis or edema   Skin: Skin color, texture, turgor normal. 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  He presented with symptoms of right lower chest discomfort on taking deep breath or coughing.  Has reported weight loss of about 10 to 15 pounds in the last couple of months.  Noted to have bilateral tiny pulmonary nodules along with moderate right-sided pleural effusion.  He has history of smoking for 25 years before he quit smoking 20 years ago.  Drinks regularly up until last 1 month when he quit drinking.  He is noted to have elevated BUN and creatinine along with hemoglobin of 9.6, on admission.    Plan:   1.  Moderate right pleural effusion,  Differential diagnosis includes benign versus malignant pleural effusion.  It is causing right lower chest discomfort.  He underwent ultrasound-guided right thoracentesis.  About 1500 cc of fluid was drained.  It was an exudative neutrophil predominant pleural effusion.  Which would typically raise the suspicion for infection.  Nevertheless cytology/culture negative.    Now saturating well on room air.    Status post 1.65 L thoracentesis earlier in the week with removal of serosanguineous fluid.  Labs consistent with a  8/19/2024 does not demonstrate any evidence of nodules.  May have been infectious/inflammatory.  Awaiting culture/cytology and repeat thoracentesis from 8/20/2024    3.  Right lower chest pain:  Seems to be from right-sided pleural effusion.  He is on painkillers Dilaudid.    4.  Acute renal failure:  Seems to be at least partially from volume contraction/dehydration.  He has not been eating well for the last couple of weeks.  On admission his BUN was 4.6 and creatinine was 91.  With hydration it is gradually improving.  Nephrology consult and input was appreciated.  With hydration renal functions are gradually improving although creatinine down slightly to 3.15 today from 3.21 yesterday    5.  SVT  Developed SVT.  Given IV Cardizem bolus and patient got started on p.o. metoprolol  Will need cardiac stress testing and workup.  Appreciate assistance from cardiology team    6.  Right hydronephrosis  Patient is status post left nephrectomy in the past.  Will get cystoscopy once cleared by cardiology.    7.  Anemia  Patient has dropped hemoglobin of 6.1.  Getting packed RBC transfusion.    Questions of patient were answered at bedside in detail  Case discussed in detail with RN, RT, and care team  Thank you for involving me in the care of this patient  I will follow with you closely during hospitalization    Time spent more than 30 minutes under patient care with no overlap reviewing results and records, decision making, and answering questions.    Lew Daugherty MD  [facility] ([facility])     This dictation was done by dragon, computer voice recognition software.  Often unanticipated grammatical, syntax, [place] phones and other interpretive errors are inadvertently transcribed.  Please excuse errors that have escaped final proofreading.

## 2024-08-28 NOTE — PROGRESS NOTES
OT tx attempted at 1335 however RN requested to hold tx d/t pt not feeling well. Will continue to follow and re-attempt OT at a later time. Thank you.

## 2024-08-28 NOTE — PROGRESS NOTES
CM reviewed medical record and spoke to attending. Orders have been written for patient to discharge/transfer to [facility] for thoracic surgery. Patient continues to have copious amount of bloody drainage from chest tube.     CM notified [facility] of transfer. Updated clinicals faxed to [phone]. [facility] continues to review clinicals to determine IRF need at discharge.

## 2024-08-28 NOTE — DISCHARGE SUMMARY
Hospitalist Discharge Summary     Patient ID:    Abel Cruz  [id number]  83 y.o.  1940    Admit date: 8/11/2024    Discharge date : 8/28/2024      Final Diagnoses:   Principal Problem:    Pleural effusion  Active Problems:    Hyperkalemia    NETO (acute kidney injury) (HCC)    Hypertension    Malignant pleural effusion    Prostate cancer (HCC)    Chronic anemia    HFrEF (heart failure with reduced ejection fraction) (HCC)    Myocardial infarction (HCC)    Atrial fibrillation (HCC)    Lung mass  Resolved Problems:    * No resolved hospital problems. *      Reason for Hospitalization/Hospital Course:     This is an elderly male with known history of hypertension, history of left nephrectomy in the past for repeated recurrent kidney stones. He presented with symptoms of right lower chest discomfort on taking deep breath or coughing. Has reported weight loss of about 10 to 15 pounds in the last couple of months. Noted to have bilateral tiny pulmonary nodules along with moderate right-sided pleural effusion. He has history of smoking for 25 years before he quit smoking 20 years ago. Drinks regularly up until last 1 month when he quit drinking. He is noted to have elevated BUN and creatinine along with hemoglobin of 9.6, on admission.      Mr. Cruz is a pleasant gentleman with history of hypertension, hyperlipidemia, CKD s/p nephrectomy, prostate cancer, chronic anemia who presented to the ER with pleuritic chest pain and subsequent. CT showed diffuse pulmonary nodules and right pleural effusion. He is s/p right thoracentesis with final cultures pending     8/14/24 echocardiogram    Left Ventricle: Severely reduced left ventricular systolic function with a visually estimated EF of 30 - 35%. Left ventricle size is normal. Moderately increased wall thickness. Global hypokinesis present. Grade I diastolic dysfunction with normal LAP.    Right  no evidence of a thoracic aortic aneurysm. There is no pericardial effusion. There is a moderately large right pleural effusion. Examination of the lung parenchyma reveals a densely calcified granuloma within the left lung apex measuring approximately 9 mm in diameter. There are innumerable tiny miliary nodules throughout both lungs which have a slight basilar predominance. Most of the pulmonary nodules measure less than 3 mm in diameter. There is a 6 mm nodule within the anterior basilar segment right lower lobe. In the upper abdomen, there is an indeterminate 7 mm nodule within the right adrenal gland. Postoperative changes are present from left nephrectomy. Examination of osseous structures reveals no lytic or blastic osseous lesions within the thorax. Multilevel thoracic spondylosis is present.    IMPRESSION: 1. Innumerable tiny miliary nodules throughout both lungs with the largest pulmonary nodule located within the right lower lobe measuring approximately 6 mm in diameter. Differential diagnosis includes miliary infection such as tuberculosis and histoplasmosis and hematogenous metastases from tumor such as thyroid carcinoma and renal cell carcinoma.  None of the pulmonary nodules are amenable to image guided percutaneous biopsy. 2. Moderately large right pleural effusion with associated right lower lobe atelectasis 2. Postoperative changes from remote left nephrectomy Reading Doctor: Olayinka Patterson Electronic Signature by: Olayinka Patterson    CT ABDOMEN WO CONTRAST    Result Date: 8/1/2024  EXAMINATION: CT abdomen and pelvis without contrast EXAMINATION DATE: 8/1/2024 CLINICAL HISTORY: Right-sided abdominal and flank pain. Weight loss and nausea.. COMPARISON: 1/28/2024 TECHNIQUE: Helical CT images of the abdomen and pelvis were obtained without contrast. The patient did receive oral contrast prior to scanning. Note: The absence of intravenous contrast precludes the ability to exclude subtle masses

## 2024-08-28 NOTE — PROGRESS NOTES
PT treatment session attempted at 1051, however pt declined tx session due to not feeling good today. Pt requested therapy come back tomorrow. Will continue to check on pt and see them for treatment session at a later time. Thank you.

## 2024-08-29 ENCOUNTER — APPOINTMENT (OUTPATIENT)
Facility: HOSPITAL | Age: 84
DRG: 180 | End: 2024-08-29
Attending: INTERNAL MEDICINE
Payer: OTHER GOVERNMENT

## 2024-08-29 PROBLEM — Z51.5 PALLIATIVE CARE ENCOUNTER: Status: ACTIVE | Noted: 2024-08-29

## 2024-08-29 PROBLEM — Z03.89 OBSERVATION FOR SUSPECTED MALIGNANT NEOPLASM: Status: ACTIVE | Noted: 2024-08-29

## 2024-08-29 PROBLEM — Z71.89 GOALS OF CARE, COUNSELING/DISCUSSION: Status: ACTIVE | Noted: 2024-08-29

## 2024-08-29 PROBLEM — R63.0 POOR APPETITE: Status: ACTIVE | Noted: 2024-08-29

## 2024-08-29 LAB
ALBUMIN SERPL-MCNC: 2 G/DL (ref 3.5–5)
ALBUMIN/GLOB SERPL: 0.5 (ref 1.1–2.2)
ALP SERPL-CCNC: 95 U/L (ref 45–117)
ALT SERPL-CCNC: 16 U/L (ref 12–78)
ANION GAP SERPL CALC-SCNC: 14 MMOL/L (ref 5–15)
APPEARANCE UR: ABNORMAL
APTT PPP: 38.2 SEC (ref 22.1–31)
AST SERPL-CCNC: 21 U/L (ref 15–37)
BACTERIA URNS QL MICRO: NEGATIVE /HPF
BASOPHILS # BLD: 0.1 K/UL (ref 0–0.1)
BASOPHILS NFR BLD: 1 % (ref 0–1)
BILIRUB SERPL-MCNC: 0.3 MG/DL (ref 0.2–1)
BILIRUB UR QL CFM: NEGATIVE
BUN SERPL-MCNC: 80 MG/DL (ref 6–20)
BUN/CREAT SERPL: 23 (ref 12–20)
CALCIUM SERPL-MCNC: 8.7 MG/DL (ref 8.5–10.1)
CHLORIDE SERPL-SCNC: 105 MMOL/L (ref 97–108)
CO2 SERPL-SCNC: 17 MMOL/L (ref 21–32)
COLOR UR: ABNORMAL
CREAT SERPL-MCNC: 3.5 MG/DL (ref 0.7–1.3)
DIFFERENTIAL METHOD BLD: ABNORMAL
EKG ATRIAL RATE: 89 BPM
EKG DIAGNOSIS: NORMAL
EKG P AXIS: 78 DEGREES
EKG P-R INTERVAL: 148 MS
EKG Q-T INTERVAL: 400 MS
EKG QRS DURATION: 94 MS
EKG QTC CALCULATION (BAZETT): 486 MS
EKG R AXIS: -48 DEGREES
EKG T AXIS: 51 DEGREES
EKG VENTRICULAR RATE: 89 BPM
EOSINOPHIL # BLD: 0.1 K/UL (ref 0–0.4)
EOSINOPHIL NFR BLD: 1 % (ref 0–7)
EPITH CASTS URNS QL MICRO: ABNORMAL /LPF
ERYTHROCYTE [DISTWIDTH] IN BLOOD BY AUTOMATED COUNT: 19.1 % (ref 11.5–14.5)
GLOBULIN SER CALC-MCNC: 3.8 G/DL (ref 2–4)
GLUCOSE BLD STRIP.AUTO-MCNC: 106 MG/DL (ref 65–117)
GLUCOSE SERPL-MCNC: 111 MG/DL (ref 65–100)
GLUCOSE UR STRIP.AUTO-MCNC: 100 MG/DL
HCT VFR BLD AUTO: 29.9 % (ref 36.6–50.3)
HGB BLD-MCNC: 9.3 G/DL (ref 12.1–17)
HGB UR QL STRIP: ABNORMAL
IMM GRANULOCYTES # BLD AUTO: 0.1 K/UL (ref 0–0.04)
IMM GRANULOCYTES NFR BLD AUTO: 1 % (ref 0–0.5)
INR PPP: 1.6 (ref 0.9–1.1)
KETONES UR QL STRIP.AUTO: NEGATIVE MG/DL
LEUKOCYTE ESTERASE UR QL STRIP.AUTO: ABNORMAL
LYMPHOCYTES # BLD: 0.7 K/UL (ref 0.8–3.5)
LYMPHOCYTES NFR BLD: 5 % (ref 12–49)
MCH RBC QN AUTO: 29.2 PG (ref 26–34)
MCHC RBC AUTO-ENTMCNC: 31.1 G/DL (ref 30–36.5)
MCV RBC AUTO: 94 FL (ref 80–99)
MONOCYTES # BLD: 2.2 K/UL (ref 0–1)
MONOCYTES NFR BLD: 15 % (ref 5–13)
NEUTS SEG # BLD: 11.6 K/UL (ref 1.8–8)
NEUTS SEG NFR BLD: 77 % (ref 32–75)
NITRITE UR QL STRIP.AUTO: NEGATIVE
NRBC # BLD: 0 K/UL (ref 0–0.01)
NRBC BLD-RTO: 0 PER 100 WBC
PH UR STRIP: 5.5 (ref 5–8)
PHOSPHATE SERPL-MCNC: 7.8 MG/DL (ref 2.6–4.7)
PLATELET # BLD AUTO: 231 K/UL (ref 150–400)
PMV BLD AUTO: 9.8 FL (ref 8.9–12.9)
POTASSIUM SERPL-SCNC: 5.7 MMOL/L (ref 3.5–5.1)
PROT SERPL-MCNC: 5.8 G/DL (ref 6.4–8.2)
PROT UR STRIP-MCNC: 100 MG/DL
PROTHROMBIN TIME: 15.9 SEC (ref 9–11.1)
RBC # BLD AUTO: 3.18 M/UL (ref 4.1–5.7)
RBC #/AREA URNS HPF: >100 /HPF (ref 0–5)
RBC MORPH BLD: ABNORMAL
RBC MORPH BLD: ABNORMAL
SERVICE CMNT-IMP: NORMAL
SODIUM SERPL-SCNC: 136 MMOL/L (ref 136–145)
SP GR UR REFRACTOMETRY: 1.01 (ref 1–1.03)
THERAPEUTIC RANGE: ABNORMAL SECS (ref 58–77)
URINE CULTURE IF INDICATED: ABNORMAL
UROBILINOGEN UR QL STRIP.AUTO: 0.2 EU/DL (ref 0.2–1)
WBC # BLD AUTO: 14.8 K/UL (ref 4.1–11.1)
WBC URNS QL MICRO: ABNORMAL /HPF (ref 0–4)

## 2024-08-29 PROCEDURE — 2060000000 HC ICU INTERMEDIATE R&B

## 2024-08-29 PROCEDURE — 80053 COMPREHEN METABOLIC PANEL: CPT

## 2024-08-29 PROCEDURE — 87086 URINE CULTURE/COLONY COUNT: CPT

## 2024-08-29 PROCEDURE — 6370000000 HC RX 637 (ALT 250 FOR IP): Performed by: NURSE PRACTITIONER

## 2024-08-29 PROCEDURE — 2580000003 HC RX 258: Performed by: FAMILY MEDICINE

## 2024-08-29 PROCEDURE — 6370000000 HC RX 637 (ALT 250 FOR IP): Performed by: FAMILY MEDICINE

## 2024-08-29 PROCEDURE — 85730 THROMBOPLASTIN TIME PARTIAL: CPT

## 2024-08-29 PROCEDURE — 6370000000 HC RX 637 (ALT 250 FOR IP): Performed by: INTERNAL MEDICINE

## 2024-08-29 PROCEDURE — 85610 PROTHROMBIN TIME: CPT

## 2024-08-29 PROCEDURE — 85025 COMPLETE CBC W/AUTO DIFF WBC: CPT

## 2024-08-29 PROCEDURE — 84100 ASSAY OF PHOSPHORUS: CPT

## 2024-08-29 PROCEDURE — 93005 ELECTROCARDIOGRAM TRACING: CPT | Performed by: FAMILY MEDICINE

## 2024-08-29 PROCEDURE — 6360000002 HC RX W HCPCS: Performed by: FAMILY MEDICINE

## 2024-08-29 PROCEDURE — 74176 CT ABD & PELVIS W/O CONTRAST: CPT

## 2024-08-29 PROCEDURE — 82962 GLUCOSE BLOOD TEST: CPT

## 2024-08-29 PROCEDURE — 81001 URINALYSIS AUTO W/SCOPE: CPT

## 2024-08-29 RX ORDER — OXYCODONE HYDROCHLORIDE 5 MG/1
5 TABLET ORAL EVERY 4 HOURS PRN
Status: DISCONTINUED | OUTPATIENT
Start: 2024-08-29 | End: 2024-08-31

## 2024-08-29 RX ORDER — ATORVASTATIN CALCIUM 40 MG/1
40 TABLET, FILM COATED ORAL DAILY
Status: DISCONTINUED | OUTPATIENT
Start: 2024-08-30 | End: 2024-09-01

## 2024-08-29 RX ORDER — CARVEDILOL 3.12 MG/1
6.25 TABLET ORAL 2 TIMES DAILY WITH MEALS
Status: DISCONTINUED | OUTPATIENT
Start: 2024-08-29 | End: 2024-09-01

## 2024-08-29 RX ORDER — LANOLIN ALCOHOL/MO/W.PET/CERES
3 CREAM (GRAM) TOPICAL NIGHTLY PRN
Status: DISCONTINUED | OUTPATIENT
Start: 2024-08-29 | End: 2024-09-01

## 2024-08-29 RX ADMIN — CARVEDILOL 6.25 MG: 3.12 TABLET, FILM COATED ORAL at 16:26

## 2024-08-29 RX ADMIN — PRIMIDONE 100 MG: 50 TABLET ORAL at 21:11

## 2024-08-29 RX ADMIN — PRIMIDONE 100 MG: 50 TABLET ORAL at 11:41

## 2024-08-29 RX ADMIN — LATANOPROST 1 DROP: 50 SOLUTION OPHTHALMIC at 21:14

## 2024-08-29 RX ADMIN — SODIUM CHLORIDE, PRESERVATIVE FREE 10 ML: 5 INJECTION INTRAVENOUS at 21:18

## 2024-08-29 RX ADMIN — SODIUM CHLORIDE, PRESERVATIVE FREE 10 ML: 5 INJECTION INTRAVENOUS at 10:00

## 2024-08-29 RX ADMIN — SODIUM ZIRCONIUM CYCLOSILICATE 10 G: 10 POWDER, FOR SUSPENSION ORAL at 11:42

## 2024-08-29 RX ADMIN — HYDRALAZINE HYDROCHLORIDE 25 MG: 25 TABLET ORAL at 21:12

## 2024-08-29 RX ADMIN — PIPERACILLIN AND TAZOBACTAM 3375 MG: 3; .375 INJECTION, POWDER, LYOPHILIZED, FOR SOLUTION INTRAVENOUS at 03:49

## 2024-08-29 RX ADMIN — OXYCODONE HYDROCHLORIDE 5 MG: 5 TABLET ORAL at 17:04

## 2024-08-29 RX ADMIN — HYDRALAZINE HYDROCHLORIDE 25 MG: 25 TABLET ORAL at 14:25

## 2024-08-29 RX ADMIN — PIPERACILLIN AND TAZOBACTAM 3375 MG: 3; .375 INJECTION, POWDER, LYOPHILIZED, FOR SOLUTION INTRAVENOUS at 16:28

## 2024-08-29 RX ADMIN — OXYCODONE HYDROCHLORIDE 5 MG: 5 TABLET ORAL at 21:12

## 2024-08-29 RX ADMIN — TRAMADOL HYDROCHLORIDE 50 MG: 50 TABLET ORAL at 06:51

## 2024-08-29 ASSESSMENT — ENCOUNTER SYMPTOMS
CONSTIPATION: 0
SHORTNESS OF BREATH: 0
VOMITING: 0
SORE THROAT: 0
BACK PAIN: 0
VOICE CHANGE: 0
STRIDOR: 0
ABDOMINAL PAIN: 0
EYE PAIN: 0
DIARRHEA: 0
NAUSEA: 0
TROUBLE SWALLOWING: 0
COUGH: 0
ABDOMINAL DISTENTION: 0
WHEEZING: 0
CHEST TIGHTNESS: 0

## 2024-08-29 ASSESSMENT — PAIN DESCRIPTION - ORIENTATION: ORIENTATION: RIGHT

## 2024-08-29 ASSESSMENT — PAIN SCALES - GENERAL
PAINLEVEL_OUTOF10: 7
PAINLEVEL_OUTOF10: 5
PAINLEVEL_OUTOF10: 7

## 2024-08-29 ASSESSMENT — PAIN DESCRIPTION - LOCATION: LOCATION: BACK

## 2024-08-29 NOTE — WOUND CARE
WOCN Note:     New consult placed for assessment of sacrum, buttocks and heels.    Chart reviewed.  Assessed in 433/01.    Abel Cruz is a 83 y.o. y/o male who presented for Pleural effusion, right  Admitted on 8/28/2024    Past Medical History:   Diagnosis Date    Hypertension      Lab Results   Component Value Date/Time    WBC 14.8 (H) 08/29/2024 05:14 AM    HGB 9.3 (L) 08/29/2024 05:14 AM    HCT 29.9 (L) 08/29/2024 05:14 AM     08/29/2024 05:14 AM        Tobacco Use      Smoking status: Former        Types: Cigarettes      Smokeless tobacco: Not on file     ADULT DIET; Regular     Assessment:   Patient is alert, communicative and requires assist with repositioning.    Bed: foam mattress  GI/: kumar  Patient reports no pain.   Patient repositioned on left side with pillow.  Heels offloaded with pillows.  Heels boggy without erythema.           Wound Assessment  POA Quynh anal/buttocks, denudation from exposure from stool;  100% red; no drainage or odor.    Wound, Pressure Prevention & Skin Care Recommendations:    Minimize layers of linen/pads under patient to optimize support surface.    2.  Turn/reposition approximately every 2 hours and offload heels.   3.  Manage moisture/ Keep skin folds clean and dry/minimize brief usage.  4.  Specialty bed: immerse low air loss ordered. Use only flat sheet and one incontinence pad.  5.  Quynh anal/buttock:  Every 12hrs and as needed cleanse and apply Triad wound cream.    Discussed above plan with RN.    Transition of Care:   Plan to follow as needed while admitted to hospital.    FLORY Aiken RN [facility]  Available on [facility]  Office [phone]

## 2024-08-29 NOTE — PLAN OF CARE
Problem: Discharge Planning  Goal: Discharge to home or other facility with appropriate resources  Outcome: Progressing     Problem: Safety - Adult  Goal: Free from fall injury  Outcome: Progressing     Problem: Pain  Goal: Verbalizes/displays adequate comfort level or baseline comfort level  Outcome: Progressing      Bedside and Verbal shift change report given to Samira (oncoming nurse) by Chloe (offgoing nurse). Report included the following information Nurse Handoff Report, Index, Intake/Output, MAR, and Cardiac Rhythm SR/ST .

## 2024-08-29 NOTE — H&P
deterioration.  I personally spent 60 minutes of critical care time.  This is time spent at this critically ill patient's bedside actively involved in patient care as well as the coordination of care and discussions with the patient's family.  This does not include any procedural time which has been billed separately.    ADVANCED DIRECTIVE/ CODE STATUS: DNR (DO NOT RESUSCITATE) as per discussion with patient who is awake, alert, oriented x 4 with a medical capacity make his own decisions.  Patient signed durable DNR form.  Order placed in chart.  Signed By: Ty Davis MD     August 28, 2024         Please note that this dictation may have been completed with Dragon, the computer voice recognition software.  Quite often unanticipated grammatical, syntax, homophones, and other interpretive errors are inadvertently transcribed by the computer software.  Please disregard these errors.  Please excuse any errors that have escaped final proofreading.

## 2024-08-30 ENCOUNTER — HOSPITAL ENCOUNTER (INPATIENT)
Facility: HOSPITAL | Age: 84
Discharge: HOME | DRG: 180 | End: 2024-09-02
Attending: INTERNAL MEDICINE
Payer: OTHER GOVERNMENT

## 2024-08-30 ENCOUNTER — APPOINTMENT (OUTPATIENT)
Facility: HOSPITAL | Age: 84
DRG: 180 | End: 2024-08-30
Attending: INTERNAL MEDICINE
Payer: OTHER GOVERNMENT

## 2024-08-30 LAB
ALBUMIN SERPL-MCNC: 1.9 G/DL (ref 3.5–5)
ALBUMIN/GLOB SERPL: 0.4 (ref 1.1–2.2)
ALP SERPL-CCNC: 82 U/L (ref 45–117)
ALT SERPL-CCNC: 12 U/L (ref 12–78)
ANION GAP SERPL CALC-SCNC: 13 MMOL/L (ref 5–15)
AST SERPL-CCNC: 16 U/L (ref 15–37)
BACTERIA SPEC CULT: NORMAL
BASOPHILS # BLD: 0 K/UL (ref 0–0.1)
BASOPHILS NFR BLD: 0 % (ref 0–1)
BILIRUB SERPL-MCNC: 0.3 MG/DL (ref 0.2–1)
BUN SERPL-MCNC: 94 MG/DL (ref 6–20)
BUN/CREAT SERPL: 23 (ref 12–20)
CALCIUM SERPL-MCNC: 8.8 MG/DL (ref 8.5–10.1)
CHLORIDE SERPL-SCNC: 104 MMOL/L (ref 97–108)
CO2 SERPL-SCNC: 16 MMOL/L (ref 21–32)
CREAT SERPL-MCNC: 4.14 MG/DL (ref 0.7–1.3)
DIFFERENTIAL METHOD BLD: ABNORMAL
EOSINOPHIL # BLD: 0.2 K/UL (ref 0–0.4)
EOSINOPHIL NFR BLD: 1 % (ref 0–7)
ERYTHROCYTE [DISTWIDTH] IN BLOOD BY AUTOMATED COUNT: 19.2 % (ref 11.5–14.5)
GLOBULIN SER CALC-MCNC: 4.6 G/DL (ref 2–4)
GLUCOSE SERPL-MCNC: 110 MG/DL (ref 65–100)
HCT VFR BLD AUTO: 26.4 % (ref 36.6–50.3)
HGB BLD-MCNC: 8.4 G/DL (ref 12.1–17)
IMM GRANULOCYTES # BLD AUTO: 0.2 K/UL (ref 0–0.04)
IMM GRANULOCYTES NFR BLD AUTO: 1 % (ref 0–0.5)
LYMPHOCYTES # BLD: 0.8 K/UL (ref 0.8–3.5)
LYMPHOCYTES NFR BLD: 5 % (ref 12–49)
MCH RBC QN AUTO: 29.8 PG (ref 26–34)
MCHC RBC AUTO-ENTMCNC: 31.8 G/DL (ref 30–36.5)
MCV RBC AUTO: 93.6 FL (ref 80–99)
MONOCYTES # BLD: 2.5 K/UL (ref 0–1)
MONOCYTES NFR BLD: 15 % (ref 5–13)
NEUTS SEG # BLD: 13 K/UL (ref 1.8–8)
NEUTS SEG NFR BLD: 78 % (ref 32–75)
NRBC # BLD: 0 K/UL (ref 0–0.01)
NRBC BLD-RTO: 0 PER 100 WBC
PLATELET # BLD AUTO: 287 K/UL (ref 150–400)
PMV BLD AUTO: 10 FL (ref 8.9–12.9)
POTASSIUM SERPL-SCNC: 5 MMOL/L (ref 3.5–5.1)
PROT SERPL-MCNC: 6.5 G/DL (ref 6.4–8.2)
RBC # BLD AUTO: 2.82 M/UL (ref 4.1–5.7)
RBC MORPH BLD: ABNORMAL
RBC MORPH BLD: ABNORMAL
SERVICE CMNT-IMP: NORMAL
SODIUM SERPL-SCNC: 133 MMOL/L (ref 136–145)
WBC # BLD AUTO: 16.7 K/UL (ref 4.1–11.1)

## 2024-08-30 PROCEDURE — 71045 X-RAY EXAM CHEST 1 VIEW: CPT

## 2024-08-30 PROCEDURE — 6370000000 HC RX 637 (ALT 250 FOR IP): Performed by: FAMILY MEDICINE

## 2024-08-30 PROCEDURE — 94760 N-INVAS EAR/PLS OXIMETRY 1: CPT

## 2024-08-30 PROCEDURE — 6370000000 HC RX 637 (ALT 250 FOR IP): Performed by: INTERNAL MEDICINE

## 2024-08-30 PROCEDURE — 2700000000 HC OXYGEN THERAPY PER DAY

## 2024-08-30 PROCEDURE — 6370000000 HC RX 637 (ALT 250 FOR IP): Performed by: NURSE PRACTITIONER

## 2024-08-30 PROCEDURE — 80053 COMPREHEN METABOLIC PANEL: CPT

## 2024-08-30 PROCEDURE — 0WP9X0Z REMOVAL OF DRAINAGE DEVICE FROM RIGHT PLEURAL CAVITY, EXTERNAL APPROACH: ICD-10-PCS | Performed by: STUDENT IN AN ORGANIZED HEALTH CARE EDUCATION/TRAINING PROGRAM

## 2024-08-30 PROCEDURE — 32999 UNLISTED PX LUNGS & PLEURA: CPT

## 2024-08-30 PROCEDURE — 2580000003 HC RX 258: Performed by: FAMILY MEDICINE

## 2024-08-30 PROCEDURE — 32551 INSERTION OF CHEST TUBE: CPT

## 2024-08-30 PROCEDURE — 2060000000 HC ICU INTERMEDIATE R&B

## 2024-08-30 PROCEDURE — 85025 COMPLETE CBC W/AUTO DIFF WBC: CPT

## 2024-08-30 PROCEDURE — 6360000002 HC RX W HCPCS: Performed by: FAMILY MEDICINE

## 2024-08-30 RX ORDER — SODIUM BICARBONATE 650 MG/1
650 TABLET ORAL 2 TIMES DAILY
Status: DISCONTINUED | OUTPATIENT
Start: 2024-08-30 | End: 2024-08-31

## 2024-08-30 RX ADMIN — SODIUM CHLORIDE, PRESERVATIVE FREE 10 ML: 5 INJECTION INTRAVENOUS at 08:41

## 2024-08-30 RX ADMIN — ATORVASTATIN CALCIUM 40 MG: 40 TABLET, FILM COATED ORAL at 08:34

## 2024-08-30 RX ADMIN — FERROUS SULFATE TAB 325 MG (65 MG ELEMENTAL FE) 325 MG: 325 (65 FE) TAB at 08:34

## 2024-08-30 RX ADMIN — Medication 3 MG: at 21:39

## 2024-08-30 RX ADMIN — LATANOPROST 1 DROP: 50 SOLUTION OPHTHALMIC at 21:33

## 2024-08-30 RX ADMIN — SODIUM BICARBONATE 650 MG: 650 TABLET ORAL at 21:28

## 2024-08-30 RX ADMIN — PRIMIDONE 100 MG: 50 TABLET ORAL at 21:28

## 2024-08-30 RX ADMIN — AMLODIPINE BESYLATE 10 MG: 5 TABLET ORAL at 08:34

## 2024-08-30 RX ADMIN — CARVEDILOL 6.25 MG: 3.12 TABLET, FILM COATED ORAL at 15:59

## 2024-08-30 RX ADMIN — PIPERACILLIN AND TAZOBACTAM 3375 MG: 3; .375 INJECTION, POWDER, LYOPHILIZED, FOR SOLUTION INTRAVENOUS at 16:08

## 2024-08-30 RX ADMIN — OXYCODONE HYDROCHLORIDE 5 MG: 5 TABLET ORAL at 02:31

## 2024-08-30 RX ADMIN — ACETAMINOPHEN 650 MG: 325 TABLET ORAL at 21:39

## 2024-08-30 RX ADMIN — SODIUM BICARBONATE 650 MG: 650 TABLET ORAL at 11:00

## 2024-08-30 RX ADMIN — OXYCODONE HYDROCHLORIDE 5 MG: 5 TABLET ORAL at 21:39

## 2024-08-30 RX ADMIN — PRIMIDONE 100 MG: 50 TABLET ORAL at 08:34

## 2024-08-30 RX ADMIN — CARVEDILOL 6.25 MG: 3.12 TABLET, FILM COATED ORAL at 08:34

## 2024-08-30 RX ADMIN — SODIUM CHLORIDE: 9 INJECTION, SOLUTION INTRAVENOUS at 03:44

## 2024-08-30 RX ADMIN — CYANOCOBALAMIN TAB 500 MCG 1000 MCG: 500 TAB at 08:34

## 2024-08-30 RX ADMIN — SODIUM CHLORIDE, PRESERVATIVE FREE 10 ML: 5 INJECTION INTRAVENOUS at 21:28

## 2024-08-30 RX ADMIN — HYDRALAZINE HYDROCHLORIDE 25 MG: 25 TABLET ORAL at 21:28

## 2024-08-30 RX ADMIN — OXYCODONE HYDROCHLORIDE 5 MG: 5 TABLET ORAL at 14:36

## 2024-08-30 RX ADMIN — HYDRALAZINE HYDROCHLORIDE 25 MG: 25 TABLET ORAL at 08:34

## 2024-08-30 RX ADMIN — HYDRALAZINE HYDROCHLORIDE 25 MG: 25 TABLET ORAL at 15:59

## 2024-08-30 RX ADMIN — PIPERACILLIN AND TAZOBACTAM 3375 MG: 3; .375 INJECTION, POWDER, LYOPHILIZED, FOR SOLUTION INTRAVENOUS at 03:45

## 2024-08-30 ASSESSMENT — PAIN SCALES - GENERAL
PAINLEVEL_OUTOF10: 8
PAINLEVEL_OUTOF10: 7
PAINLEVEL_OUTOF10: 7
PAINLEVEL_OUTOF10: 4
PAINLEVEL_OUTOF10: 3
PAINLEVEL_OUTOF10: 4

## 2024-08-30 ASSESSMENT — PAIN DESCRIPTION - ONSET: ONSET: ON-GOING

## 2024-08-30 ASSESSMENT — PAIN DESCRIPTION - PAIN TYPE: TYPE: CHRONIC PAIN

## 2024-08-30 ASSESSMENT — PAIN DESCRIPTION - ORIENTATION
ORIENTATION: POSTERIOR
ORIENTATION: RIGHT
ORIENTATION: RIGHT

## 2024-08-30 ASSESSMENT — PAIN DESCRIPTION - LOCATION
LOCATION: BACK

## 2024-08-30 ASSESSMENT — PAIN - FUNCTIONAL ASSESSMENT: PAIN_FUNCTIONAL_ASSESSMENT: PREVENTS OR INTERFERES SOME ACTIVE ACTIVITIES AND ADLS

## 2024-08-30 ASSESSMENT — PAIN DESCRIPTION - DESCRIPTORS
DESCRIPTORS: ACHING;DULL
DESCRIPTORS: ACHING

## 2024-08-30 ASSESSMENT — PAIN DESCRIPTION - FREQUENCY: FREQUENCY: INTERMITTENT

## 2024-08-30 NOTE — PLAN OF CARE
2100 - Attempted to draw the BMP scheduled for 1600. Pt kindly refused and asked to have it drawn in the AM with other labs.     Problem: Discharge Planning  Goal: Discharge to home or other facility with appropriate resources  Outcome: Progressing     Problem: Safety - Adult  Goal: Free from fall injury  Outcome: Progressing     Problem: Pain  Goal: Verbalizes/displays adequate comfort level or baseline comfort level  Outcome: Progressing

## 2024-08-30 NOTE — CONSULTS
[address]  [address]                              CONSULTATION      PATIENT NAME: WAYNE GARCIA                : 1940  MED REC NO: [id number]                       ROOM: [id number]  ACCOUNT NO: [id number]                       ADMIT DATE: 2024  PROVIDER: Mickie Fox MD    DATE OF SERVICE:  2024    ATTENDING PHYSICIAN:  AUSTIN GUARDADO    REASON FOR CONSULTATION:  Seen for NETO.    Thanks for the consult.    HISTORY OF PRESENT ILLNESS:  The patient was transferred from another facility.  Since the , his creatinine has been as high as 4.1, down to 2.7, now slowly creeping up to 3.2, and today, his potassium is elevated.  He was seen at another facility by Dr. Merritt when he had an NETO in the setting of volume depletion and hydronephrosis.  Baseline is unknown.  He had left nephrectomy and Kim has been placed.  He underwent right thoracentesis on 2024, also supposed to get cysto, but it was canceled because of SVT and we were called to see him for the NEOT.  Urology had seen him and they are ordering another imaging.  The previous imaging done on  and he had a hydro on the right side.    PAST MEDICAL HISTORY:    1. Left nephrectomy.  2. Chronic kidney disease, not sure what is his baseline.  3. Prostate cancer.  4. Atrial fibrillation.  5. Anemia.  6. Pleural effusion with lung nodules.    SOCIAL HISTORY:  Reviewed.    FAMILY HISTORY:  Reviewed.    MEDICATIONS:  As inpatient includes amlodipine, Lipitor, iron, hydralazine, Zosyn, primidone, propranolol.    ALLERGIES:  ALLERGIC TO NAPROXEN, LISINOPRIL, AND MORPHINE.      REVIEW OF SYSTEMS:  As noted in the HPI, other systems negative.    PHYSICAL EXAMINATION:  GENERAL:  Cachectic looking.  VITAL SIGNS:  /90, 99% oxygen saturation.  Urine output is at least 800 mL.  EXTREMITIES:  No edema.    LABORATORY DATA:  Hemoglobin 9.3, platelets 231, WBC 14.8, eosinophils 
                  CARDIOLOGY CONSULT                  Subjective:    Date of  Admission:   Admission type:Emergency    Jeremy Grimes MD     This is a 83 yom who had presented initially with flank pain. He was hyperkalemic and CT showed concerns for metastatic disease. He was transferred to [facility]. He was scheduled for cystoscopy today but this was canclled due to SVT. SVT resolved without intervention beyond IV BB. He had an echo which showed a decreased LVEF and then a stress test which showed a predominantly fixed defect. He has had multiple thoracenteses performed and he had a pleural drainage catheter placed. He was then transferred again now to [place] for thoracic surgery evaluation. He is not interested in any surgical procedure.    Patient Active Problem List   Diagnosis    Pleural effusion    Hydronephrosis    Hyperkalemia    NETO (acute kidney injury) (HCC)    Hypertension    Malignant pleural effusion    Prostate cancer (HCC)    Chronic anemia    HFrEF (heart failure with reduced ejection fraction) (HCC)    Myocardial infarction (HCC)    Atrial fibrillation (HCC)    Lung mass    Pleural effusion, right        Past Medical History:   Diagnosis Date    Hypertension       Past Surgical History:   Procedure Laterality Date    IR CHEST TUBE INSERTION  8/23/2024    IR CHEST TUBE INSERTION 8/23/2024 Huseyin Hamlin Jr., APRN - NP [facility] ANGIO IR    KIDNEY REMOVAL      over 20 years ago      [facility]  Social History     Socioeconomic History    Marital status: Single     Spouse name: Not on file    Number of children: Not on file    Years of education: Not on file    Highest education level: Not on file   Occupational History    Not on file   Tobacco Use    Smoking status: Former     Types: Cigarettes    Smokeless tobacco: Not on file   Substance and Sexual Activity    Alcohol use: Not on file    Drug use: Not on file    Sexual activity: Not on file   Other Topics Concern    Not on file   Social 
Seen and examined  Thanks for the consult  A/P:  CKD ? Baseline ,with 0.7 grams of proteinuria  NETO,came with creat 4.1 at another facility,dropped to 2.7 now slowly up;had also rt hydro   Hyperkalemia  Left nephrectomy  Rt hydro  Pleural effusion,chest tube,lung nodules    Lokelma  Re-imaging by urology to check for hydro  Worrisome lung situation  Recheck BMP  Will follow  
Tobacco History       Smoking Status  Former Smoking Tobacco Type  Cigarettes      Smokeless Tobacco Use  Unknown                    No family history on file.    Review of Systems   Constitutional:  Positive for activity change, appetite change and unexpected weight change (weight loss, states he wasn't eating). Negative for chills, fatigue and fever.   HENT:  Negative for sneezing, sore throat, trouble swallowing and voice change.    Eyes:  Negative for pain and visual disturbance.   Respiratory:  Negative for cough, chest tightness, shortness of breath, wheezing and stridor.    Cardiovascular:  Positive for chest pain (at chest tube site). Negative for palpitations and leg swelling.   Gastrointestinal:  Negative for abdominal distention, abdominal pain, constipation, diarrhea, nausea and vomiting.   Musculoskeletal:  Negative for back pain, gait problem, myalgias and neck pain.   Skin:  Negative for pallor, rash and wound.   Neurological:  Negative for dizziness, syncope, weakness, light-headedness and headaches.   Hematological:  Negative for adenopathy.   Psychiatric/Behavioral:  Negative for dysphoric mood. The patient is not nervous/anxious.        Physical Exam:  BP (!) 154/9   Pulse 93   Temp 97.4 °F (36.3 °C) (Oral)   Resp 15   Ht 1.676 m (5' 6\")   Wt 58.1 kg (128 lb 1.4 oz)   SpO2 99%   BMI 20.67 kg/m²     Physical Exam  Vitals and nursing note reviewed.   Constitutional:       General: He is not in acute distress.     Appearance: Normal appearance. He is not toxic-appearing or diaphoretic.      Comments: cachetic   HENT:      Head: Normocephalic and atraumatic.      Right Ear: External ear normal.      Left Ear: External ear normal.      Nose: Nose normal.      Mouth/Throat:      Mouth: Mucous membranes are moist.      Pharynx: Oropharynx is clear. No oropharyngeal exudate.   Eyes:      General: No scleral icterus.     Extraocular Movements: Extraocular movements intact.      Conjunctiva/sclera: 
would want comfort    Please refer to Palliative Medicine ACP notes for further details.    PALLIATIVE ASSESSMENT:      Palliative Performance Scale (PPS):  PPS: 40    ECOG:   ECOG Status : Limited self-care [3]    Modified ESAS:  Modified-[place] Symptom Assessment Scale (ESAS)  Tiredness Score: 5  Drowsiness Score: Not drowsy  Depression Score: Not depressed  Pain Score: 6  Anxiety Score: Not anxious  Nausea Score: Not nauseated  Appetite Score: 8  Dyspnea Score: No shortness of breath  Wellbeing Score: Best feeling of wellbeing    Clinical Pain Assessment (nonverbal scale for severity on nonverbal patients):   Clinical Pain Assessment  Severity: 6  Location: right lateral side and back  Character: sharp  Duration: week  Factors: unknown  Frequency: continuous       NVPS:       RDOS:  RDOS  Heart rate per minute: less than 90  Respiratory Rate per Minute: less than 19  Restlessness:non-purposeful: None  Paradoxical breathing pattern:abdomen moves in on inspiration: None  Grunting at end-expiration: guttural sound: None  Nasal flaring: involuntary movement of nares: None  Look of fear: None  Total : 0      Vital Signs: Blood pressure 130/80, pulse (!) 102, temperature 97.6 °F (36.4 °C), temperature source Oral, resp. rate 15, height 1.676 m (5' 6\"), weight 58.1 kg (128 lb 1.4 oz), SpO2 99%, peak flow 99 L/min.    PHYSICAL ASSESSMENT:   General: [x] Oriented x3  [x] Well appearing  [] Intubated  []Ill appearing  []Other:  Mental Status: [x] Normal mental status exam  [] Drowsy  [] Confused  []Other:  Cardiovascular: [] Regular rate/rhythm  [x] Arrhythmia  [] Other:  Chest: [x] Effort normal  []Lungs clear  [] Respiratory distress  []Tachypnea  [] Other:  Abdomen: [] Soft/non-tender  [x] Normal appearance  [] Distended  [] Ascites  [] Other:  Neurological: [x] Normal speech  [] Normal sensation  []Deficits present:  Extremity: [x] Normal skin color/temp  [] Clubbing/cyanosis  [] No edema  [] Other:    Wt Readings 
1/14/22   ProviderMitesh MD   propranolol (INNOPRAN XL) 80 MG extended release capsule Take 1 capsule by mouth daily 4/30/24   ProviderMitesh MD        PMHx:  has a past medical history of Hypertension.   PSurgHx:  has a past surgical history that includes Kidney removal and IR CHEST TUBE INSERTION (8/23/2024).  PSocHx:  reports that he has quit smoking. His smoking use included cigarettes. He does not have any smokeless tobacco history on file.   ROS:  (Complete - 10 systems) - Negative unless reported in HPI  Denies:   [x] Weight Loss (Constitutional)  [x] Dry Mouth (ENMT)  [x] Palpitations (CV)                         [x] SOB (Respiratory)  [x] Constipation (GI)  [x] Major Focal Weakness (MS)  [x] Pallor (Skin)                            [x] TIA Sx (Neuro)  [x] Hallicinations (Psych)                [x] Easy Bleeding (Heme)       Physical Exam: (Comprehesive - 8+ 1995 Systems)     (1) Constitutional:  NAD, pleasant  FIO2:   on SpO2: SpO2: 99 %       Patient Vitals for the past 24 hrs:   BP Temp Temp src Pulse Resp SpO2 Height Weight   08/29/24 0713 (!) 154/9 97.4 °F (36.3 °C) Oral 93 15 99 % -- --   08/29/24 0629 -- -- -- -- -- -- -- 58.1 kg (128 lb 1.4 oz)   08/29/24 0549 -- -- -- 93 -- -- -- --   08/29/24 0402 (!) 156/90 97.6 °F (36.4 °C) Oral -- -- 95 % -- --   08/29/24 0003 130/83 97.6 °F (36.4 °C) Oral 84 16 100 % 1.676 m (5' 6\") 58.1 kg (128 lb)   08/28/24 2251 126/77 -- -- -- -- -- -- --   08/28/24 2210 -- -- -- -- 19 -- -- --   08/28/24 2015 117/70 97.8 °F (36.6 °C) Oral 78 15 99 % -- --       [unfilled]                                                      Signed By: Susana Garcia, APRN - NP  - August 29, 2024  
the procedure without difficulty. ESTIMATED BLOOD LOSS:  < 1 ml SPECIMENS:  None     Technically successful ultrasound guided right pleural drainage catheter placement.  A post procedure chest x-ray is pending. Electronically signed by MACARIO ANDERSON    XR CHEST PORTABLE    Result Date: 8/23/2024  EXAM:  XR CHEST PORTABLE INDICATION: post chest tube COMPARISON: 0 853 TECHNIQUE: portable chest AP view obtained portably at 1444 hours FINDINGS: The cardiac silhouette is stable. A right-sided chest tube has been introduced with the decrease in size of the right pleural effusion. No pneumothorax. Pulmonary edema has decreased as compared to the prior study. Underlying osteopenia.      Decreased right pleural effusion with chest tube in place. Improving pulmonary edema. Electronically signed by Anali Camargo      Encounter Date: 08/28/24   EKG 12 Lead   Result Value    Ventricular Rate 89    Atrial Rate 89    P-R Interval 148    QRS Duration 94    Q-T Interval 400    QTc Calculation (Bazett) 486    P Axis 78    R Axis -48    T Axis 51    Diagnosis      Sinus rhythm with premature supraventricular complexes  Left axis deviation Nonspecific ST and T wave abnormality Prolonged QT  Inferior infarct , age undetermined  Abnormal ECG  No previous ECGs available  Confirmed by RASHI Stein Massimo ([id number]) on 8/29/2024 2:06:19 PM          Tele- reviewed    Medical decision making:   I have reviewed the flowsheet and previous day's notes  Patient has acute or chronic illness that poses a threat to life or bodily function  Review and order of Clinical lab tests  Review and Order of Radiology tests  Independent visualization of Image  High Risk Drug therapy requiring intensive monitoring for toxicity: eg steroids, pressors, antibiotics      Thank you for allowing me to participate in this patient's care.    MD Halle Holley MD, [facility]  [facility]

## 2024-08-30 NOTE — PLAN OF CARE
Problem: Discharge Planning  Goal: Discharge to home or other facility with appropriate resources  8/30/2024 1019 by Cris Saha RN  Outcome: Progressing  8/30/2024 0510 by Samira Meier RN  Outcome: Progressing     Problem: Safety - Adult  Goal: Free from fall injury  8/30/2024 1019 by Cris Saha RN  Outcome: Progressing  8/30/2024 0510 by Samira Meier RN  Outcome: Progressing     Problem: Pain  Goal: Verbalizes/displays adequate comfort level or baseline comfort level  8/30/2024 1019 by Cris Saha RN  Outcome: Progressing  8/30/2024 0510 by Samira Meier RN  Outcome: Progressing     Problem: Skin/Tissue Integrity  Goal: Absence of new skin breakdown  Description: 1.  Monitor for areas of redness and/or skin breakdown  2.  Assess vascular access sites hourly  3.  Every 4-6 hours minimum:  Change oxygen saturation probe site  4.  Every 4-6 hours:  If on nasal continuous positive airway pressure, respiratory therapy assess nares and determine need for appliance change or resting period.  Outcome: Progressing

## 2024-08-31 LAB
ALBUMIN SERPL-MCNC: 1.8 G/DL (ref 3.5–5)
ALBUMIN/GLOB SERPL: 0.5 (ref 1.1–2.2)
ALP SERPL-CCNC: 72 U/L (ref 45–117)
ALT SERPL-CCNC: 12 U/L (ref 12–78)
ANION GAP SERPL CALC-SCNC: 15 MMOL/L (ref 5–15)
AST SERPL-CCNC: 16 U/L (ref 15–37)
BILIRUB SERPL-MCNC: 0.3 MG/DL (ref 0.2–1)
BUN SERPL-MCNC: 92 MG/DL (ref 6–20)
BUN/CREAT SERPL: 21 (ref 12–20)
CALCIUM SERPL-MCNC: 8.3 MG/DL (ref 8.5–10.1)
CHLORIDE SERPL-SCNC: 105 MMOL/L (ref 97–108)
CO2 SERPL-SCNC: 14 MMOL/L (ref 21–32)
CREAT SERPL-MCNC: 4.46 MG/DL (ref 0.7–1.3)
GLOBULIN SER CALC-MCNC: 3.8 G/DL (ref 2–4)
GLUCOSE SERPL-MCNC: 104 MG/DL (ref 65–100)
POTASSIUM SERPL-SCNC: 5.2 MMOL/L (ref 3.5–5.1)
PROT SERPL-MCNC: 5.6 G/DL (ref 6.4–8.2)
SODIUM SERPL-SCNC: 134 MMOL/L (ref 136–145)

## 2024-08-31 PROCEDURE — 6370000000 HC RX 637 (ALT 250 FOR IP): Performed by: FAMILY MEDICINE

## 2024-08-31 PROCEDURE — 2580000003 HC RX 258: Performed by: FAMILY MEDICINE

## 2024-08-31 PROCEDURE — 6360000002 HC RX W HCPCS: Performed by: FAMILY MEDICINE

## 2024-08-31 PROCEDURE — 2060000000 HC ICU INTERMEDIATE R&B

## 2024-08-31 PROCEDURE — 6370000000 HC RX 637 (ALT 250 FOR IP): Performed by: NURSE PRACTITIONER

## 2024-08-31 PROCEDURE — 6370000000 HC RX 637 (ALT 250 FOR IP): Performed by: INTERNAL MEDICINE

## 2024-08-31 PROCEDURE — 36415 COLL VENOUS BLD VENIPUNCTURE: CPT

## 2024-08-31 PROCEDURE — 80053 COMPREHEN METABOLIC PANEL: CPT

## 2024-08-31 PROCEDURE — 6370000000 HC RX 637 (ALT 250 FOR IP): Performed by: HOSPITALIST

## 2024-08-31 RX ORDER — OXYCODONE HYDROCHLORIDE 5 MG/1
10 TABLET ORAL EVERY 4 HOURS PRN
Status: DISCONTINUED | OUTPATIENT
Start: 2024-08-31 | End: 2024-09-01

## 2024-08-31 RX ORDER — HYDROMORPHONE HYDROCHLORIDE 1 MG/ML
1 INJECTION, SOLUTION INTRAMUSCULAR; INTRAVENOUS; SUBCUTANEOUS
Status: DISCONTINUED | OUTPATIENT
Start: 2024-08-31 | End: 2024-09-01 | Stop reason: SDUPTHER

## 2024-08-31 RX ORDER — OXYCODONE HYDROCHLORIDE 5 MG/1
5 TABLET ORAL EVERY 4 HOURS PRN
Status: DISCONTINUED | OUTPATIENT
Start: 2024-08-31 | End: 2024-09-01

## 2024-08-31 RX ORDER — LORAZEPAM 2 MG/ML
1 INJECTION INTRAMUSCULAR EVERY 6 HOURS PRN
Status: DISCONTINUED | OUTPATIENT
Start: 2024-08-31 | End: 2024-09-01

## 2024-08-31 RX ORDER — SODIUM BICARBONATE 650 MG/1
1300 TABLET ORAL 2 TIMES DAILY
Status: DISCONTINUED | OUTPATIENT
Start: 2024-08-31 | End: 2024-09-01

## 2024-08-31 RX ORDER — SENNA AND DOCUSATE SODIUM 50; 8.6 MG/1; MG/1
2 TABLET, FILM COATED ORAL DAILY
Status: DISCONTINUED | OUTPATIENT
Start: 2024-08-31 | End: 2024-09-01

## 2024-08-31 RX ADMIN — HYDRALAZINE HYDROCHLORIDE 25 MG: 25 TABLET ORAL at 22:13

## 2024-08-31 RX ADMIN — HYDRALAZINE HYDROCHLORIDE 25 MG: 25 TABLET ORAL at 08:34

## 2024-08-31 RX ADMIN — CARVEDILOL 6.25 MG: 3.12 TABLET, FILM COATED ORAL at 16:23

## 2024-08-31 RX ADMIN — FERROUS SULFATE TAB 325 MG (65 MG ELEMENTAL FE) 325 MG: 325 (65 FE) TAB at 08:34

## 2024-08-31 RX ADMIN — OXYCODONE HYDROCHLORIDE 5 MG: 5 TABLET ORAL at 08:34

## 2024-08-31 RX ADMIN — SODIUM CHLORIDE, PRESERVATIVE FREE 10 ML: 5 INJECTION INTRAVENOUS at 08:34

## 2024-08-31 RX ADMIN — CARVEDILOL 6.25 MG: 3.12 TABLET, FILM COATED ORAL at 08:34

## 2024-08-31 RX ADMIN — ONDANSETRON 4 MG: 4 TABLET, ORALLY DISINTEGRATING ORAL at 12:51

## 2024-08-31 RX ADMIN — SODIUM CHLORIDE, PRESERVATIVE FREE 10 ML: 5 INJECTION INTRAVENOUS at 22:19

## 2024-08-31 RX ADMIN — ATORVASTATIN CALCIUM 40 MG: 40 TABLET, FILM COATED ORAL at 08:34

## 2024-08-31 RX ADMIN — PRIMIDONE 100 MG: 50 TABLET ORAL at 08:34

## 2024-08-31 RX ADMIN — HYDRALAZINE HYDROCHLORIDE 25 MG: 25 TABLET ORAL at 14:37

## 2024-08-31 RX ADMIN — CYANOCOBALAMIN TAB 500 MCG 1000 MCG: 500 TAB at 08:34

## 2024-08-31 RX ADMIN — PRIMIDONE 100 MG: 50 TABLET ORAL at 22:13

## 2024-08-31 RX ADMIN — OXYCODONE HYDROCHLORIDE 5 MG: 5 TABLET ORAL at 15:12

## 2024-08-31 RX ADMIN — LATANOPROST 1 DROP: 50 SOLUTION OPHTHALMIC at 22:15

## 2024-08-31 RX ADMIN — PIPERACILLIN AND TAZOBACTAM 3375 MG: 3; .375 INJECTION, POWDER, LYOPHILIZED, FOR SOLUTION INTRAVENOUS at 03:32

## 2024-08-31 RX ADMIN — OXYCODONE HYDROCHLORIDE 10 MG: 5 TABLET ORAL at 22:14

## 2024-08-31 RX ADMIN — SODIUM BICARBONATE 1300 MG: 650 TABLET ORAL at 22:13

## 2024-08-31 RX ADMIN — AMLODIPINE BESYLATE 10 MG: 5 TABLET ORAL at 08:34

## 2024-08-31 RX ADMIN — SODIUM BICARBONATE 650 MG: 650 TABLET ORAL at 08:34

## 2024-08-31 RX ADMIN — OXYCODONE HYDROCHLORIDE 5 MG: 5 TABLET ORAL at 01:55

## 2024-08-31 ASSESSMENT — PAIN DESCRIPTION - ORIENTATION
ORIENTATION: RIGHT
ORIENTATION: RIGHT
ORIENTATION: MID
ORIENTATION: RIGHT

## 2024-08-31 ASSESSMENT — PAIN DESCRIPTION - LOCATION
LOCATION: BACK

## 2024-08-31 ASSESSMENT — PAIN DESCRIPTION - DESCRIPTORS
DESCRIPTORS: ACHING;DISCOMFORT
DESCRIPTORS: ACHING
DESCRIPTORS: SHARP
DESCRIPTORS: ACHING

## 2024-08-31 ASSESSMENT — PAIN SCALES - GENERAL
PAINLEVEL_OUTOF10: 2
PAINLEVEL_OUTOF10: 7
PAINLEVEL_OUTOF10: 6
PAINLEVEL_OUTOF10: 6
PAINLEVEL_OUTOF10: 7
PAINLEVEL_OUTOF10: 6
PAINLEVEL_OUTOF10: 8

## 2024-08-31 NOTE — PLAN OF CARE
Problem: Discharge Planning  Goal: Discharge to home or other facility with appropriate resources  8/31/2024 0948 by Angelina Hester RN  Outcome: Progressing  Flowsheets (Taken 8/31/2024 0800)  Discharge to home or other facility with appropriate resources: Identify barriers to discharge with patient and caregiver  8/30/2024 2251 by Jesse Shields RN  Outcome: Progressing     Problem: Safety - Adult  Goal: Free from fall injury  8/31/2024 0948 by Angelina Hester RN  Outcome: Progressing  8/30/2024 2251 by Jesse Shields RN  Outcome: Progressing     Problem: Pain  Goal: Verbalizes/displays adequate comfort level or baseline comfort level  8/31/2024 0948 by Angelina Hester RN  Outcome: Progressing  Flowsheets (Taken 8/31/2024 0800)  Verbalizes/displays adequate comfort level or baseline comfort level: Encourage patient to monitor pain and request assistance  8/30/2024 2251 by Jesse Shields RN  Outcome: Progressing     Problem: Skin/Tissue Integrity  Goal: Absence of new skin breakdown  Description: 1.  Monitor for areas of redness and/or skin breakdown  2.  Assess vascular access sites hourly  3.  Every 4-6 hours minimum:  Change oxygen saturation probe site  4.  Every 4-6 hours:  If on nasal continuous positive airway pressure, respiratory therapy assess nares and determine need for appliance change or resting period.  8/31/2024 0948 by Angelina Hester RN  Outcome: Progressing  8/30/2024 2251 by Jesse Shields RN  Outcome: Progressing     Problem: ABCDS Injury Assessment  Goal: Absence of physical injury  Outcome: Progressing

## 2024-09-01 VITALS
RESPIRATION RATE: 20 BRPM | DIASTOLIC BLOOD PRESSURE: 88 MMHG | BODY MASS INDEX: 19.84 KG/M2 | TEMPERATURE: 97.5 F | HEART RATE: 93 BPM | WEIGHT: 123.46 LBS | SYSTOLIC BLOOD PRESSURE: 125 MMHG | HEIGHT: 66 IN | OXYGEN SATURATION: 99 %

## 2024-09-01 LAB
ALBUMIN SERPL-MCNC: 2 G/DL (ref 3.5–5)
ALBUMIN/GLOB SERPL: 0.5 (ref 1.1–2.2)
ALP SERPL-CCNC: 74 U/L (ref 45–117)
ALT SERPL-CCNC: 10 U/L (ref 12–78)
ANION GAP SERPL CALC-SCNC: 12 MMOL/L (ref 5–15)
AST SERPL-CCNC: 18 U/L (ref 15–37)
BASOPHILS # BLD: 0 K/UL (ref 0–0.1)
BASOPHILS NFR BLD: 0 % (ref 0–1)
BILIRUB SERPL-MCNC: 0.4 MG/DL (ref 0.2–1)
BUN SERPL-MCNC: 96 MG/DL (ref 6–20)
BUN/CREAT SERPL: 19 (ref 12–20)
CALCIUM SERPL-MCNC: 8.8 MG/DL (ref 8.5–10.1)
CHLORIDE SERPL-SCNC: 102 MMOL/L (ref 97–108)
CO2 SERPL-SCNC: 20 MMOL/L (ref 21–32)
CREAT SERPL-MCNC: 4.94 MG/DL (ref 0.7–1.3)
DIFFERENTIAL METHOD BLD: ABNORMAL
EOSINOPHIL # BLD: 0.1 K/UL (ref 0–0.4)
EOSINOPHIL NFR BLD: 1 % (ref 0–7)
ERYTHROCYTE [DISTWIDTH] IN BLOOD BY AUTOMATED COUNT: 19.4 % (ref 11.5–14.5)
GLOBULIN SER CALC-MCNC: 4.1 G/DL (ref 2–4)
GLUCOSE SERPL-MCNC: 125 MG/DL (ref 65–100)
HCT VFR BLD AUTO: 25 % (ref 36.6–50.3)
HGB BLD-MCNC: 7.8 G/DL (ref 12.1–17)
IMM GRANULOCYTES # BLD AUTO: 0.1 K/UL (ref 0–0.04)
IMM GRANULOCYTES NFR BLD AUTO: 1 % (ref 0–0.5)
LYMPHOCYTES # BLD: 0.6 K/UL (ref 0.8–3.5)
LYMPHOCYTES NFR BLD: 4 % (ref 12–49)
MCH RBC QN AUTO: 29.2 PG (ref 26–34)
MCHC RBC AUTO-ENTMCNC: 31.2 G/DL (ref 30–36.5)
MCV RBC AUTO: 93.6 FL (ref 80–99)
MONOCYTES # BLD: 1.8 K/UL (ref 0–1)
MONOCYTES NFR BLD: 12 % (ref 5–13)
NEUTS SEG # BLD: 12.3 K/UL (ref 1.8–8)
NEUTS SEG NFR BLD: 82 % (ref 32–75)
NRBC # BLD: 0 K/UL (ref 0–0.01)
NRBC BLD-RTO: 0 PER 100 WBC
PLATELET # BLD AUTO: 285 K/UL (ref 150–400)
PLATELET COMMENT: ABNORMAL
PMV BLD AUTO: 9.3 FL (ref 8.9–12.9)
POTASSIUM SERPL-SCNC: 5.2 MMOL/L (ref 3.5–5.1)
PROT SERPL-MCNC: 6.1 G/DL (ref 6.4–8.2)
RBC # BLD AUTO: 2.67 M/UL (ref 4.1–5.7)
RBC MORPH BLD: ABNORMAL
SODIUM SERPL-SCNC: 134 MMOL/L (ref 136–145)
WBC # BLD AUTO: 14.9 K/UL (ref 4.1–11.1)

## 2024-09-01 PROCEDURE — 36415 COLL VENOUS BLD VENIPUNCTURE: CPT

## 2024-09-01 PROCEDURE — 80053 COMPREHEN METABOLIC PANEL: CPT

## 2024-09-01 PROCEDURE — 6360000002 HC RX W HCPCS: Performed by: FAMILY MEDICINE

## 2024-09-01 PROCEDURE — 6360000002 HC RX W HCPCS: Performed by: INTERNAL MEDICINE

## 2024-09-01 PROCEDURE — 6360000002 HC RX W HCPCS: Performed by: HOSPITALIST

## 2024-09-01 PROCEDURE — 85025 COMPLETE CBC W/AUTO DIFF WBC: CPT

## 2024-09-01 PROCEDURE — 6370000000 HC RX 637 (ALT 250 FOR IP): Performed by: INTERNAL MEDICINE

## 2024-09-01 PROCEDURE — 6370000000 HC RX 637 (ALT 250 FOR IP): Performed by: FAMILY MEDICINE

## 2024-09-01 PROCEDURE — 2580000003 HC RX 258: Performed by: FAMILY MEDICINE

## 2024-09-01 RX ORDER — HYDROMORPHONE HYDROCHLORIDE 2 MG/ML
2 INJECTION, SOLUTION INTRAMUSCULAR; INTRAVENOUS; SUBCUTANEOUS EVERY 4 HOURS
Status: DISCONTINUED | OUTPATIENT
Start: 2024-09-01 | End: 2024-09-02 | Stop reason: HOSPADM

## 2024-09-01 RX ORDER — HYDROMORPHONE HYDROCHLORIDE 2 MG/ML
1 INJECTION, SOLUTION INTRAMUSCULAR; INTRAVENOUS; SUBCUTANEOUS
Status: DISCONTINUED | OUTPATIENT
Start: 2024-09-01 | End: 2024-09-01

## 2024-09-01 RX ORDER — LORAZEPAM 2 MG/ML
1 INJECTION INTRAMUSCULAR
Status: DISCONTINUED | OUTPATIENT
Start: 2024-09-01 | End: 2024-09-02 | Stop reason: HOSPADM

## 2024-09-01 RX ORDER — PROCHLORPERAZINE EDISYLATE 5 MG/ML
10 INJECTION INTRAMUSCULAR; INTRAVENOUS EVERY 6 HOURS
Status: DISCONTINUED | OUTPATIENT
Start: 2024-09-01 | End: 2024-09-02 | Stop reason: HOSPADM

## 2024-09-01 RX ORDER — PROCHLORPERAZINE EDISYLATE 5 MG/ML
10 INJECTION INTRAMUSCULAR; INTRAVENOUS EVERY 6 HOURS PRN
Status: DISCONTINUED | OUTPATIENT
Start: 2024-09-01 | End: 2024-09-01

## 2024-09-01 RX ORDER — HYDROMORPHONE HYDROCHLORIDE 2 MG/ML
2 INJECTION, SOLUTION INTRAMUSCULAR; INTRAVENOUS; SUBCUTANEOUS
Status: DISCONTINUED | OUTPATIENT
Start: 2024-09-01 | End: 2024-09-02 | Stop reason: HOSPADM

## 2024-09-01 RX ORDER — LORAZEPAM 2 MG/ML
1 INJECTION INTRAMUSCULAR EVERY 4 HOURS
Status: DISCONTINUED | OUTPATIENT
Start: 2024-09-01 | End: 2024-09-02 | Stop reason: HOSPADM

## 2024-09-01 RX ORDER — SCOLOPAMINE TRANSDERMAL SYSTEM 1 MG/1
1 PATCH, EXTENDED RELEASE TRANSDERMAL
Status: DISCONTINUED | OUTPATIENT
Start: 2024-09-01 | End: 2024-09-02 | Stop reason: HOSPADM

## 2024-09-01 RX ADMIN — HYDROMORPHONE HYDROCHLORIDE 1 MG: 2 INJECTION, SOLUTION INTRAMUSCULAR; INTRAVENOUS; SUBCUTANEOUS at 08:41

## 2024-09-01 RX ADMIN — ONDANSETRON 4 MG: 2 INJECTION INTRAMUSCULAR; INTRAVENOUS at 08:42

## 2024-09-01 RX ADMIN — ONDANSETRON 4 MG: 2 INJECTION INTRAMUSCULAR; INTRAVENOUS at 00:08

## 2024-09-01 RX ADMIN — SODIUM CHLORIDE, PRESERVATIVE FREE 10 ML: 5 INJECTION INTRAVENOUS at 08:43

## 2024-09-01 RX ADMIN — HYDROMORPHONE HYDROCHLORIDE 2 MG: 2 INJECTION, SOLUTION INTRAMUSCULAR; INTRAVENOUS; SUBCUTANEOUS at 11:04

## 2024-09-01 RX ADMIN — HYDROMORPHONE HYDROCHLORIDE 1 MG: 2 INJECTION, SOLUTION INTRAMUSCULAR; INTRAVENOUS; SUBCUTANEOUS at 02:38

## 2024-09-01 RX ADMIN — PROCHLORPERAZINE EDISYLATE 10 MG: 5 INJECTION INTRAMUSCULAR; INTRAVENOUS at 11:21

## 2024-09-01 RX ADMIN — LORAZEPAM 1 MG: 2 INJECTION INTRAMUSCULAR; INTRAVENOUS at 11:05

## 2024-09-01 ASSESSMENT — PAIN SCALES - GENERAL
PAINLEVEL_OUTOF10: 10
PAINLEVEL_OUTOF10: 10

## 2024-09-01 ASSESSMENT — PAIN DESCRIPTION - LOCATION
LOCATION: CHEST
LOCATION: GENERALIZED

## 2024-09-01 ASSESSMENT — PAIN DESCRIPTION - DESCRIPTORS: DESCRIPTORS: STABBING;SORE;SHOOTING

## 2024-09-01 ASSESSMENT — PAIN DESCRIPTION - ORIENTATION: ORIENTATION: MID

## 2024-09-01 NOTE — DISCHARGE SUMMARY
bilaterally, persistent right hydronephrosis and hydroureter, and dilated cardiomyopathy with coronary artery atherosclerosis.   Request was for transfer to [facility] for thoracic surgery evaluation for possible VATS/tissue biopsy. Patient was transferred and admitted by the hospitalist service.         CT ABDOMEN PELVIS WO CONTRAST Additional Contrast? None     Result Date: 8/29/2024  1. 5.3 cm abdominal aortic aneurysm 2. Right pleural effusion with right pleural drain 3. Innumerable micronodules at the bilateral lung bases with a 2.6 x 1.6 mm pleural-based mass in the anterior right middle lobe unchanged 4. Left hip replacement. Soft tissue mass anterior to the left hip replacement may be related to particle disease but is nonspecific Electronically signed by Collin Puente        Pleural effusion, right  Lung nodules/pleural mass, cannot exclude malignancy   Acute respiratory failure due to above   -having Right chest tube/ pigtail pleural drainage catheter since 8/23/2024 ;  -1950 cc of dark hemorrhagic colored fluid drained out 8/28, Monitor chest tube output;  -Consulted  thoracic surgery to evaluate for possible VATS/tissue biopsy; patient declined surgery or diagnostic workup; per consultant, he would not want any further treatment if given and diagnosis of malignancy;  -pulmonary was consulted at OSH:  \"Pulmonology eval to patient and fill this could be metastatic disease from colon/kidney.  However, pulmonary arteries are tiny and not amenable for bronchoscopy, biopsy or CT-guided biopsy.  Will follow pleural fluid studies from right-sided thoracentesis to determine nature of pleural fluid.\"  Due to pleural cytology being negative, pulmonology recommended outpatient PET scan for further evaluation of possible malignancy;   -Ciprofloxacin was switched to Rocephin and doxycycline by pulmonary team, then changed to Zosyn after transfer to [facility] due to increasing WBC;  - 8/23/24 chest tube placed  -

## 2024-09-01 NOTE — PROGRESS NOTES
Hospitalist Progress Note  Yesenia Lamas MD  Answering service: [phone]        Date of Service:  2024  NAME:  Abel Cruz  :  1940  MRN:  [id number]      Admission Summary:   Abel Cruz is a 83 y.o. male with past medical history of hypertension, hyperlipidemia, CKD, right hydronephrosis, status post left nephrectomy, recurrent kidney stones, prostate cancer, anemia of chronic disease, former smoker, atrial fibrillation, SVT, presented as a direct admission/transfer from [facility] to [facility] with chest pain due to right pleural effusion.  Patient was hospitalized at [facility] from 2024 to 2024 with diagnoses including right pleural effusion, diffuse lung nodules, hyperkalemia, NETO, hypertension, atrial fibrillation, HFrEF (heart failure reduced ejection fraction), anemia (chronic).  Patient initially complained of left-sided chest pain and ~ 10 to 15 pound weight loss in 2 months.  (Of note CT chest without IV contrast on 2024 showed innumerable tiny miliary nodules throughout both lungs with largest pulmonary nodule within the right lower lobe measuring 6 mm (differential diagnoses including tuberculosis, histoplasmosis, metastasis from tumor such as thyroid carcinoma and renal cell carcinoma) moderately large right pleural effusion with associated right lower lobe atelectasis.  On 2024, chest x-ray portable showed right pleural effusion, asymmetric right lung pulmonary edema versus opacities.  On 2024, patient underwent IR ultrasound-guided chest tube/right pleural drainage catheter placement.  On 2024, patient was noted to have increased bloody output from the chest tube with hemoglobin of 6.1.  Patient was transfused 2 units PRBCs.  Cardiac workup included TTE on 2024 showing severely reduced left 
                                                                         NAME: Abel Cruz        :  1940        MRN:  [id number]         Assessment:   CKD ? Baseline ,with 0.7 grams of proteinuria  NETO,came with creat 4.1 at another facility,dropped to 2.7 now slowly up to 4.5; 2nd ATN  Hyperkalemia  Left nephrectomy;rt hydro resolved;kumar  Rt hydro;UTI  Pleural effusion,chest tube,lung nodules     Plan:     Worrisome lung situation  Creatinine worse  On Po Bicarb - increase dose  Heading toward needing dialysis, a poor candidate; Dr. Baltazar Fiore had a long discussion with him about dialysis, he said he DOES NOT WANT DIALYSIS  Continue medical management as able      Subjective:     Chief Complaint:  back pain controlled with pain meds. No other complaint.      Review of Systems:    Symptom Y/N Comments  Symptom Y/N Comments   Fever/Chills    Chest Pain     Poor Appetite    Edema     Cough    Abdominal Pain     Sputum    Joint Pain     SOB/STAHL    Pruritis/Rash     Nausea/vomit    Tolerating PT/OT     Diarrhea    Tolerating Diet     Constipation    Other       Could not obtain due to:      Objective:     VITALS:   Last 24hrs VS reviewed since prior progress note. Most recent are:  Vitals:    24 0930   BP: 118/74   Pulse:    Resp:    Temp: 97.5 °F (36.4 °C)   SpO2:        Intake/Output Summary (Last 24 hours) at 2024 1000  Last data filed at 2024 0800  Gross per 24 hour   Intake 854.05 ml   Output 440 ml   Net 414.05 ml      Telemetry Reviewed:     PHYSICAL EXAM:  General: NAD      Lab Data Reviewed: (see below)    Medications Reviewed: (see below)    PMH/SH reviewed - no change compared to H&P  ________________________________________________________________________  Care Plan discussed with:  Patient     Family      RN     Care Manager                    Consultant:          Comments   >50% of visit spent in counseling and coordination of care   
  Physician Progress Note      PATIENT:               WAYNE GARCIA  CSN #:                  [id number]  :                       1940  ADMIT DATE:       2024 8:16 PM  DISCH DATE:  RESPONDING  PROVIDER #:        Ann-Marie Babb MD          QUERY TEXT:    Patient admitted with malignant pleural effusion.  H&P noted that patient had   a nuclear medicine cardiac stress test at previous hospital was positive for   myocardial infarction.  If possible, please document in the progress notes and   discharge summary if you are evaluating and/or treating any of the following:    The medical record reflects the following:  Risk Factors: male age 83 years, HTN, CKD, DM, acute blood loss anemia,   dilated cardiomyopathy, chronic HFrEF.  Clinical Indicators:  2024, H&P, Dr. Ty Davis MD:  \"On 2024   for nuclear medicine cardiac stress test was positive for myocardial   infarction.\"  2024, echo TTE report:  Interpretation Summary  Left Ventricle: Severely reduced left ventricular systolic function with a   visually estimated EF of 30 - 35%. Left ventricle size is normal. Moderately   increased wall thickness. Global hypokinesis present. Grade I diastolic   dysfunction with normal LAP.  Right Ventricle: Mildly reduced systolic function.  Aorta: Normal sized aortic root. Mildly dilated ascending aorta. Ao ascending   diameter is 3.9 cm.  Extracardiac: Right pleural effusion.  Image quality is fair.  troponin hi sensitivity:  38 --> 33 on 2024 --> 28 on `2024  Treatment: carvedilol, amlodipine    Thank you,  LISS Cueva RN, CDS  [email or web address]  Options provided:  -- NSTEMI  -- Type 2 MI  -- Demand Ischemia with MI  -- Other - I will add my own diagnosis  -- Disagree - Not applicable / Not valid  -- Disagree - Clinically unable to determine / Unknown  -- Refer to Clinical Documentation Reviewer    PROVIDER RESPONSE TEXT:    This patient has a Type 2 MI.    Query created by: Lilia Cueva on 
4 Eyes Skin Assessment     NAME:  Abel Cruz  YOB: 1940  MEDICAL RECORD NUMBER:  [id number]    The patient is being assessed for  Admission    I agree that at least one RN has performed a thorough Head to Toe Skin Assessment on the patient. ALL assessment sites listed below have been assessed.      Areas assessed by both nurses:    Head, Face, Ears, Shoulders, Back, Chest, Arms, Elbows, Hands, Sacrum. Buttock, Coccyx, Ischium, Legs. Feet and Heels, and Under Medical Devices         Does the Patient have a Wound? Yes wound(s) were present on assessment. LDA wound assessment was Initiated and completed by RN       Raymon Prevention initiated by RN: Yes  Wound Care Orders initiated by RN: No    Pressure Injury (Stage 3,4, Unstageable, DTI, NWPT, and Complex wounds) if present, place Wound referral order by RN under : No    New Ostomies, if present place, Ostomy referral order under : No     Nurse 1 eSignature: Electronically signed by Angelina Hester RN on 8/28/24 at 7:52 PM EDT    **SHARE this note so that the co-signing nurse can place an eSignature**    Nurse 2 eSignature: Electronically signed by GEOVANNA FAM RN on 8/29/24 at 3:31 AM EDT   
830: Pt vomited large amount, received in report pt vomited 4 times overnight. Pt bathed. Pt given prn zofran and dilaudid as pt is moaning and asking for help. Message sent to Dr. Lamas regarding persistent vomiting.     1140: Pt ,  at bedside and pronounced pt.   
[facility]  Good Help to Those in Need  [phone]     Patient Name: Abel Cruz  YOB: 1940  Age: 83 y.o.    [facility] RN Note:  Hospice liaison made bedside visit with Dr Lamas. Nieces at bedside. Patient is restless, moaning in pain and short of breath. Per bedside nurse she gave 1mg of Dilaudid with no effect.  Dr Lamas agrees to adjust comfort meds, d/c all po meds and focus on comfort.   I spoke with patient's daughter, Roxana,  by phone and she is in [place]. She was not planning to be here until tomorrow but due to his condition will be coming today. She is about 7 hours away so will be here this evening.   I assured her that we have made medication changes for his comfort.   She was very grateful for our support.     Thank you for the opportunity to be of service to this patient.     Huong Swann RN  Clinical Nurse Liaison  [facility]  [phone] Mobile  [phone] Office   Available on Perfect Serve    
Now comfort care. I will sign off, but please call with questions/changes.    Stephie Mathews MD  
Occupational Therapy    Acknowledge OT order and completed chart review.  Patient was oriented x4.  He politely declined OT services today.  He reports plans to dc chest tube and put in drain then \"it will tell him\" the next plan.  Educated on role of acute OT in hospital for mobility to maximize comfort, skin integrity, and independence.  He politely continued to decline and is aware it will be difficult for him to return home alone at this time as he is below baseline.  He was adamant he does not want additional medical care and is ok with dying as he has had a good life and told this writer he is a DNR.     He needed max A to initiate drinking and total A x2 to reposition in bed.     Will continue to follow for OT eval as medically appropriate.  Discussed interaction with case management and RN.    
Occupational Therapy  08/31/24    Chart reviewed. Pt politely declined therapy intervention at this time, stated \"there is no point.\" Pt is not interested in therapy evaluations or OT/PT following pt while admitted. Assisted with re-positioning and provided apple juice. OT to sign off per pt's wishes. Gail Wheat, OT     
Physical Therapy  8/30/2024    Order received, chart reviewed. Attempted to see for PT session. Patient reported he is currently awaiting drain placement and that his tolerance of his home drainage system will provide more information on his prognosis. He politely but adamantly refused all mobility/OOB despite gentle education and encouragement. Offered sitting EOB, transfer to BSC, and OOB to chair and he stated he would \"maybe try tomorrow.\" Honored his wishes and will follow up as able. Thank you.    Misty Cardoso, PT, DPT, CCS  
Physical Therapy 8/31/2024    Per OT, \"Pt politely declined therapy intervention at this time, stated \"there is no point.\" Pt is not interested in therapy evaluations or OT/PT following pt while admitted.\" Will complete orders.     Thank you.  Loretta Vigil, PT, DPT    
RENAL  PROGRESS NOTE        Subjective:    As per RN ,no wanting malignancy work up  Objective:   VITALS SIGNS:    BP (!) 156/95   Pulse 92   Temp 97.8 °F (36.6 °C) (Oral)   Resp 14   Ht 1.676 m (5' 6\")   Wt 56.3 kg (124 lb 1.9 oz)   SpO2 99%   PF 99 L/min   BMI 20.03 kg/m²             Temp (24hrs), Av.2 °F (36.2 °C), Min:94.6 °F (34.8 °C), Max:97.8 °F (36.6 °C)         PHYSICAL EXAM:  Cachectic  Alert,oriented to time and place    DATA REVIEW:     INTAKE / OUTPUT:   Last shift:      701 - 1900  In: -   Out: 150   Last 3 shifts: 1901 - 700  In: -   Out: 1485 [Urine:1050]    Intake/Output Summary (Last 24 hours) at 2024 0958  Last data filed at 2024 0742  Gross per 24 hour   Intake --   Output 545 ml   Net -545 ml         LABS:   LABS:  Recent Labs     24  0507 24  0514 24  1053 24  0229 24  0637 24  0129   * 136 136 139 139 139   K 5.0 5.7* 5.1 5.3* 4.9 4.9    105 106 108 108 109*   CO2 16* 17* 18* 21 23 21   BUN 94* 80* 78* 72* 77* 74*   CREATININE 4.14* 3.50* 3.23* 2.86* 3.02* 3.01*   CALCIUM 8.8 8.7 8.7 9.1 9.1 9.1   PHOS  --  7.8* 6.6* 5.1* 4.9* 4.4   MG  --   --   --  1.7 1.8 1.8     Recent Labs     24  0507 24  0514 24  1053   WBC 16.7* 14.8* 10.8   HGB 8.4* 9.3* 9.9*   HCT 26.4* 29.9* 30.6*    231 200     No results for input(s): \"KU\", \"CLU\" in the last 720 hours.    Invalid input(s): \"ZAKI\", \"CREAU\", \"PROU\"      Assessment:   CKD ? Baseline ,with 0.7 grams of proteinuria  NETO,came with creat 4.1 at another facility,dropped to 2.7 now slowly up; 2nd ATN  Hyperkalemia,better  Left nephrectomy;rt hydro resolved;kumar  Rt hydro;UTI  Pleural effusion,chest tube,lung nodules           Worrisome lung situation  Creat up  Po Bic   Heading toward needing dialysis,will be a poor candidate;I had a long discussion with him about dialysis,he said he DOES NOT WANT DIALYSIS  Discussed with RN also        
Reviewed CT A/P 8/29/24 results with the patient. No hydronephrosis. Kumar appears to have been placed on 8/12 one day prior to renal US 8/13 which demonstrated mild right hydro. No documented bladder scans prior to kumar placement. Would advise monitoring post void residuals once kumar is no longer need by Nephrology to ensure that he isn't retaining as a contributor. Call with questions.   
Spiritual Health Assessment/Progress Note  [facility]    Grief, Loss, and Adjustments, Family Care, Grief and loss,      Name: Abel Cruz MRN: [id number]    Age: 83 y.o.     Sex: male   Language: English   Cheondoism: Hinduism   Pleural effusion, right     Date: 2024            Total Time Calculated: 45 min              Spiritual Assessment continued in [facility] 4W TELEMETRY        Referral/Consult From: Nurse   Encounter Overview/Reason: Grief, Loss, and Adjustments  Service Provided For: Family    Donya, Belief, Meaning:   Patient unable to communicate at this time  Family/Friends identify as spiritual, are connected with a donya tradition or spiritual practice, and have beliefs or practices that help with coping during difficult times      Importance and Influence:  Patient unable to communicate at this time  Family/Friends Other: Patient     Community:  Patient Other: N/A  Family/Friends are connected with a spiritual community: and feel well-supported. Support system includes: Children, Donya Community, and Friends    Assessment and Plan of Care:     Patient Interventions include: Other: NA  Family/Friends Interventions include: Affirmed coping skills/support systems and Engaged in life review and/or legacy    Patient Plan of Care: Other: NA  Family/Friends Plan of Care: No spiritual needs identified for follow-up    Electronically signed by Chaplain Enriqueta Resident on 2024 at 6:03 PM   
Spiritual Health Assessment/Progress Note  [facility]    Grief, Loss, and Adjustments (Death),  , Death,      Name: Abel Cruz MRN: [id number]    Age: 83 y.o.     Sex: male   Language: English   Jehovah's witness: Worship   Pleural effusion, right     Date: 2024            Total Time Calculated: 27 min              Spiritual Assessment began in [facility] 4W TELEMETRY        Referral/Consult From: Nurse   Encounter Overview/Reason: Grief, Loss, and Adjustments (Death)  Service Provided For: Patient    Donya, Belief, Meaning:   Patient unable to communicate at this time  Family/Friends No family/friends present      Importance and Influence:  Patient unable to communicate at this time  Family/Friends no family/friends present    Community:  Patient Other: Patient   Family/Friends Other: N/A    Assessment and Plan of Care:     Patient Interventions include: Other: N/A  Family/Friends Interventions include: Other: N/A    Patient Plan of Care: Other: N/A  Family/Friends Plan of Care: Spiritual Care available upon further referral    Electronically signed by Shantel Robisonin Resident on 2024 at 12:26 PM   
[facility]  Good Help to Those in Need  [phone]     Patient Name: Abel Cruz  YOB: 1940  Age: 83 y.o.    [facility] RN Note:  Hospice consult received, reviewing chart. Will follow up with Unit Nurse and Care Manager to discuss plan of care, patient status and discharge disposition within the hour.     Thank you for the opportunity to be of service to this patient.     Huong Swann RN  Clinical Nurse Liaison  [facility]  [phone] Mobile  [phone] Office   Available on Perfect Serve     
systolic ejection fraction 30% to 35% with grade 1 diastolic dysfunction ascending aortic aneurysm diameter of 3.9 cm and extracardiac findings of right pleural effusion.  On 8/23/2024 for nuclear medicine cardiac stress test was positive for myocardial infarction.  On 8/25/2024, CT chest showed near complete drainage of large right pleural effusion following thoracentesis with placement of drainage tube with small right hydropneumothorax, improved right lower lobe lung expansion, minimal left pleural effusion, small amount of mucus in the trachea, lung hyperexpansion with multiple tiny micronodules bilaterally, persistent right hydronephrosis and hydroureter, and dilated cardiomyopathy with coronary artery atherosclerosis.  Request was for transfer to [facility] for thoracic surgery evaluation for possible VATS/tissue biopsy.  Patient was transferred here today, patient is now seen for admission to the hospitalist service.  Patient complains of persistent right-sided chest pain after chest tube placement.  Patient continues to have bleeding from right pleural drainage catheter.  At current, he denies any shortness of breath.        Interval history / Subjective:   No sob, mild pain at the chest tube side   Chest tube with bloody pleural effusion noticed daily output around 200cc      CT ABDOMEN PELVIS WO CONTRAST Additional Contrast? None    Result Date: 8/29/2024  1. 5.3 cm abdominal aortic aneurysm 2. Right pleural effusion with right pleural drain 3. Innumerable micronodules at the bilateral lung bases with a 2.6 x 1.6 mm pleural-based mass in the anterior right middle lobe unchanged 4. Left hip replacement. Soft tissue mass anterior to the left hip replacement may be related to particle disease but is nonspecific Electronically signed by Collin Puente    Assessment & Plan:      Pleural effusion, right   Lung nodules  -having Right chest tube/ pigtail pleural drainage catheter since 8/23/2024 (  
systolic ejection fraction 30% to 35% with grade 1 diastolic dysfunction ascending aortic aneurysm diameter of 3.9 cm and extracardiac findings of right pleural effusion.  On 8/23/2024 for nuclear medicine cardiac stress test was positive for myocardial infarction.  On 8/25/2024, CT chest showed near complete drainage of large right pleural effusion following thoracentesis with placement of drainage tube with small right hydropneumothorax, improved right lower lobe lung expansion, minimal left pleural effusion, small amount of mucus in the trachea, lung hyperexpansion with multiple tiny micronodules bilaterally, persistent right hydronephrosis and hydroureter, and dilated cardiomyopathy with coronary artery atherosclerosis.  Request was for transfer to [facility] for thoracic surgery evaluation for possible VATS/tissue biopsy.  Patient was transferred here today, patient is now seen for admission to the hospitalist service.  Patient complains of persistent right-sided chest pain after chest tube placement.  Patient continues to have bleeding from right pleural drainage catheter.  At current, he denies any shortness of breath.        Interval history / Subjective:   Patient is more weaker compared to yes     CT ABDOMEN PELVIS WO CONTRAST Additional Contrast? None    Result Date: 8/29/2024  1. 5.3 cm abdominal aortic aneurysm 2. Right pleural effusion with right pleural drain 3. Innumerable micronodules at the bilateral lung bases with a 2.6 x 1.6 mm pleural-based mass in the anterior right middle lobe unchanged 4. Left hip replacement. Soft tissue mass anterior to the left hip replacement may be related to particle disease but is nonspecific Electronically signed by Collin Puente    Assessment & Plan:      Pleural effusion, right   Lung nodules/pleural mass possible malignancy   Acute respiratory failure due to above   -having Right chest tube/ pigtail pleural drainage catheter since 8/23/2024 (    -1950 
\"PO2\" in the last 72 hours.  No results for input(s): \"CPK\" in the last 72 hours.    Invalid input(s): \"CPKMB\", \"CKNDX\", \"TROIQ\"  No results found for: \"CHOL\", \"CHLST\", \"CHOLV\", \"HDL\", \"HDLC\", \"LDL\"  No results found for: \"GLUCPOC\"  [unfilled]      Medications Reviewed:     Current Facility-Administered Medications   Medication Dose Route Frequency    sodium chloride flush 0.9 % injection 5-40 mL  5-40 mL IntraVENous 2 times per day    sodium chloride flush 0.9 % injection 5-40 mL  5-40 mL IntraVENous PRN    0.9 % sodium chloride infusion   IntraVENous PRN    ondansetron (ZOFRAN-ODT) disintegrating tablet 4 mg  4 mg Oral Q8H PRN    Or    ondansetron (ZOFRAN) injection 4 mg  4 mg IntraVENous Q6H PRN    polyethylene glycol (GLYCOLAX) packet 17 g  17 g Oral Daily PRN    acetaminophen (TYLENOL) tablet 650 mg  650 mg Oral Q6H PRN    Or    acetaminophen (TYLENOL) suppository 650 mg  650 mg Rectal Q6H PRN    amLODIPine (NORVASC) tablet 10 mg  10 mg Oral Daily    atorvastatin (LIPITOR) tablet 20 mg  20 mg Oral Daily    vitamin B-12 (CYANOCOBALAMIN) tablet 1,000 mcg  1,000 mcg Oral Daily    ferrous sulfate (IRON 325) tablet 325 mg  325 mg Oral Daily with breakfast    hydrALAZINE (APRESOLINE) tablet 25 mg  25 mg Oral TID    latanoprost (XALATAN) 0.005 % ophthalmic solution 1 drop  1 drop Both Eyes Nightly    lidocaine 4 % external patch 1 patch  1 patch TransDERmal Daily    primidone (MYSOLINE) tablet 100 mg  100 mg Oral BID    propranolol (INDERAL LA) extended release capsule 80 mg  80 mg Oral Daily    traMADol (ULTRAM) tablet 50 mg  50 mg Oral Q6H PRN    piperacillin-tazobactam (ZOSYN) 3,375 mg in sodium chloride 0.9 % 50 mL IVPB (mini-bag)  3,375 mg IntraVENous Q12H     ______________________________________________________________________  EXPECTED LENGTH OF STAY: 7  ACTUAL LENGTH OF STAY:          1                 nAn-Marie Babb MD

## 2024-09-01 NOTE — PLAN OF CARE
Problem: Discharge Planning  Goal: Discharge to home or other facility with appropriate resources  Outcome: Progressing     Problem: Safety - Adult  Goal: Free from fall injury  Outcome: Progressing     Problem: Pain  Goal: Verbalizes/displays adequate comfort level or baseline comfort level  Outcome: Progressing  Flowsheets (Taken 8/31/2024 1200 by Angelina Hester RN)  Verbalizes/displays adequate comfort level or baseline comfort level: Encourage patient to monitor pain and request assistance     Problem: Skin/Tissue Integrity  Goal: Absence of new skin breakdown  Description: 1.  Monitor for areas of redness and/or skin breakdown  2.  Assess vascular access sites hourly  3.  Every 4-6 hours minimum:  Change oxygen saturation probe site  4.  Every 4-6 hours:  If on nasal continuous positive airway pressure, respiratory therapy assess nares and determine need for appliance change or resting period.  Outcome: Progressing     Problem: ABCDS Injury Assessment  Goal: Absence of physical injury  Outcome: Progressing

## 2024-09-01 NOTE — CARE COORDINATION
Called pt and rescheduled him to Wed 11/15/23.    Maite   KEVIN- Pending referral to [facility]    GIOVANNA received a page from W regarding this pt. According to his nurse, he is near end of life. There is a hospice consult. GIOVANNA sent this referral to [facility] via [employer]. GIOVANNA also messaged Huong,with  Hospice to inform her. Heike Kerr,BSW,ACM-SW

## (undated) DEVICE — GARMENT,MEDLINE,DVT,INT,CALF,MED, GEN2: Brand: MEDLINE

## (undated) DEVICE — SOUTHSIDE TURNOVER: Brand: MEDLINE INDUSTRIES, INC.